# Patient Record
Sex: FEMALE | Race: ASIAN | NOT HISPANIC OR LATINO | Employment: FULL TIME | URBAN - METROPOLITAN AREA
[De-identification: names, ages, dates, MRNs, and addresses within clinical notes are randomized per-mention and may not be internally consistent; named-entity substitution may affect disease eponyms.]

---

## 2017-09-13 ENCOUNTER — COMMUNICATION - HEALTHEAST (OUTPATIENT)
Dept: FAMILY MEDICINE | Facility: CLINIC | Age: 22
End: 2017-09-13

## 2017-09-26 ENCOUNTER — COMMUNICATION - HEALTHEAST (OUTPATIENT)
Dept: TELEHEALTH | Facility: CLINIC | Age: 22
End: 2017-09-26

## 2017-09-26 ENCOUNTER — PRENATAL OFFICE VISIT - HEALTHEAST (OUTPATIENT)
Dept: FAMILY MEDICINE | Facility: CLINIC | Age: 22
End: 2017-09-26

## 2017-09-26 ENCOUNTER — COMMUNICATION - HEALTHEAST (OUTPATIENT)
Dept: FAMILY MEDICINE | Facility: CLINIC | Age: 22
End: 2017-09-26

## 2017-09-26 ENCOUNTER — HOSPITAL ENCOUNTER (OUTPATIENT)
Dept: ULTRASOUND IMAGING | Facility: HOSPITAL | Age: 22
Discharge: HOME | End: 2017-09-26
Attending: PHYSICIAN ASSISTANT

## 2017-09-26 DIAGNOSIS — Z34.00 NORMAL PREGNANCY, FIRST: ICD-10-CM

## 2017-09-26 DIAGNOSIS — N92.6 MISSED PERIOD: ICD-10-CM

## 2017-09-26 DIAGNOSIS — Q24.9 CONGENITAL HEART DEFECT: ICD-10-CM

## 2017-09-26 DIAGNOSIS — Z32.01 POSITIVE PREGNANCY TEST: ICD-10-CM

## 2017-09-29 ENCOUNTER — COMMUNICATION - HEALTHEAST (OUTPATIENT)
Dept: FAMILY MEDICINE | Facility: CLINIC | Age: 22
End: 2017-09-29

## 2017-10-06 ENCOUNTER — COMMUNICATION - HEALTHEAST (OUTPATIENT)
Dept: SCHEDULING | Facility: CLINIC | Age: 22
End: 2017-10-06

## 2017-10-12 ENCOUNTER — COMMUNICATION - HEALTHEAST (OUTPATIENT)
Dept: FAMILY MEDICINE | Facility: CLINIC | Age: 22
End: 2017-10-12

## 2017-10-30 ENCOUNTER — COMMUNICATION - HEALTHEAST (OUTPATIENT)
Dept: TELEHEALTH | Facility: CLINIC | Age: 22
End: 2017-10-30

## 2017-10-30 ENCOUNTER — PRENATAL OFFICE VISIT - HEALTHEAST (OUTPATIENT)
Dept: FAMILY MEDICINE | Facility: CLINIC | Age: 22
End: 2017-10-30

## 2017-10-30 DIAGNOSIS — Z12.4 SCREENING FOR CERVICAL CANCER: ICD-10-CM

## 2017-10-30 DIAGNOSIS — Z23 NEED FOR IMMUNIZATION AGAINST INFLUENZA: ICD-10-CM

## 2017-10-30 DIAGNOSIS — Z87.74 HX OF CONGENITAL ANOMALY OF HEART: ICD-10-CM

## 2017-10-30 DIAGNOSIS — Z34.01 SUPERVISION OF NORMAL FIRST PREGNANCY IN FIRST TRIMESTER: ICD-10-CM

## 2017-10-30 DIAGNOSIS — R11.0 NAUSEA: ICD-10-CM

## 2017-10-30 LAB
HBV SURFACE AG SERPL QL IA: NEGATIVE
HEPATITIS B SURFACE ANTIBODY LHE- HISTORICAL: POSITIVE
HIV 1+2 AB+HIV1 P24 AG SERPL QL IA: NEGATIVE

## 2017-10-30 ASSESSMENT — MIFFLIN-ST. JEOR: SCORE: 1529.68

## 2017-10-31 LAB — SYPHILIS RPR SCREEN - HISTORICAL: NORMAL

## 2017-11-01 ENCOUNTER — OFFICE VISIT - HEALTHEAST (OUTPATIENT)
Dept: CARDIOLOGY | Facility: CLINIC | Age: 22
End: 2017-11-01

## 2017-11-01 DIAGNOSIS — Q24.9 CONGENITAL HEART DISEASE: ICD-10-CM

## 2017-11-01 LAB
ATRIAL RATE - MUSE: 75 BPM
DIASTOLIC BLOOD PRESSURE - MUSE: NORMAL MMHG
INTERPRETATION ECG - MUSE: NORMAL
P AXIS - MUSE: 24 DEGREES
PR INTERVAL - MUSE: 150 MS
QRS DURATION - MUSE: 70 MS
QT - MUSE: 398 MS
QTC - MUSE: 444 MS
R AXIS - MUSE: 49 DEGREES
SYSTOLIC BLOOD PRESSURE - MUSE: NORMAL MMHG
T AXIS - MUSE: 21 DEGREES
VENTRICULAR RATE- MUSE: 75 BPM

## 2017-11-01 ASSESSMENT — MIFFLIN-ST. JEOR: SCORE: 1516.07

## 2017-11-03 ENCOUNTER — HOSPITAL ENCOUNTER (OUTPATIENT)
Dept: CARDIOLOGY | Facility: CLINIC | Age: 22
Discharge: HOME | End: 2017-11-03
Attending: INTERNAL MEDICINE

## 2017-11-03 LAB
AORTIC ROOT: 2.6 CM
AORTIC VALVE MEAN VELOCITY: 97.7 CM/S
AV DIMENSIONLESS INDEX VTI: 0.7
AV MEAN GRADIENT: 5 MMHG
AV PEAK GRADIENT: 9.5 MMHG
AV VALVE AREA: 1.8 CM2
AV VELOCITY RATIO: 0.7
BSA FOR ECHO PROCEDURE: 1.9 M2
CV BLOOD PRESSURE: NORMAL MMHG
CV ECHO HEIGHT: 63 IN
CV ECHO WEIGHT: 179 LBS
DOP CALC AO PEAK VEL: 154 CM/S
DOP CALC AO VTI: 27.8 CM
DOP CALC LVOT AREA: 2.54 CM2
DOP CALC LVOT DIAMETER: 1.8 CM
DOP CALC LVOT PEAK VEL: 106 CM/S
DOP CALC LVOT STROKE VOLUME: 49.1 CM3
DOP CALCLVOT PEAK VEL VTI: 19.3 CM
EJECTION FRACTION: 59 % (ref 55–75)
FRACTIONAL SHORTENING: 37.8 % (ref 28–44)
INTERVENTRICULAR SEPTUM IN END DIASTOLE: 1 CM (ref 0.6–0.9)
IVS/PW RATIO: 1
LA AREA 1: 15.9 CM2
LA AREA 2: 12.9 CM2
LEFT ATRIUM LENGTH: 4.97 CM
LEFT ATRIUM SIZE: 2.8 CM
LEFT ATRIUM TO AORTIC ROOT RATIO: 1.08 NO UNITS
LEFT ATRIUM VOLUME INDEX: 18.5 ML/M2
LEFT ATRIUM VOLUME: 35.1 CM3
LEFT VENTRICLE CARDIAC INDEX: 1.8 L/MIN/M2
LEFT VENTRICLE CARDIAC OUTPUT: 3.3 L/MIN
LEFT VENTRICLE DIASTOLIC VOLUME INDEX: 29.5 CM3/M2 (ref 34–74)
LEFT VENTRICLE DIASTOLIC VOLUME: 56 CM3 (ref 46–106)
LEFT VENTRICLE HEART RATE: 68 BPM
LEFT VENTRICLE MASS INDEX: 59.2 G/M2
LEFT VENTRICLE SYSTOLIC VOLUME INDEX: 12.1 CM3/M2 (ref 11–31)
LEFT VENTRICLE SYSTOLIC VOLUME: 23 CM3 (ref 14–42)
LEFT VENTRICULAR INTERNAL DIMENSION IN DIASTOLE: 3.7 CM (ref 3.8–5.2)
LEFT VENTRICULAR INTERNAL DIMENSION IN SYSTOLE: 2.3 CM (ref 2.2–3.5)
LEFT VENTRICULAR MASS: 112.5 G
LEFT VENTRICULAR OUTFLOW TRACT MEAN GRADIENT: 2 MMHG
LEFT VENTRICULAR OUTFLOW TRACT MEAN VELOCITY: 70.6 CM/S
LEFT VENTRICULAR OUTFLOW TRACT PEAK GRADIENT: 4 MMHG
LEFT VENTRICULAR POSTERIOR WALL IN END DIASTOLE: 1 CM (ref 0.6–0.9)
LV STROKE VOLUME INDEX: 25.8 ML/M2
MITRAL VALVE E/A RATIO: 1.4
MV AVERAGE E/E' RATIO: 6.2 CM/S
MV DECELERATION TIME: 169 MS
MV E'TISSUE VEL-LAT: 15.8 CM/S
MV E'TISSUE VEL-MED: 9.26 CM/S
MV LATERAL E/E' RATIO: 4.9
MV MEDIAL E/E' RATIO: 8.4
MV PEAK A VELOCITY: 54.5 CM/S
MV PEAK E VELOCITY: 77.5 CM/S
NUC REST DIASTOLIC VOLUME INDEX: 2864 LBS
NUC REST SYSTOLIC VOLUME INDEX: 63 IN
TRICUSPID VALVE ANULAR PLANE SYSTOLIC EXCURSION: 2.7 CM

## 2017-11-03 ASSESSMENT — MIFFLIN-ST. JEOR: SCORE: 1516.07

## 2017-11-06 ENCOUNTER — COMMUNICATION - HEALTHEAST (OUTPATIENT)
Dept: FAMILY MEDICINE | Facility: CLINIC | Age: 22
End: 2017-11-06

## 2017-11-06 DIAGNOSIS — Z36.9 1ST TRIMESTER SCREENING: ICD-10-CM

## 2017-11-06 LAB
BKR LAB AP ABNORMAL BLEEDING: NO
BKR LAB AP BIRTH CONTROL/HORMONES: NORMAL
BKR LAB AP CERVICAL APPEARANCE: NORMAL
BKR LAB AP GYN ADEQUACY: NORMAL
BKR LAB AP GYN INTERPRETATION: NORMAL
BKR LAB AP HPV REFLEX: NO
BKR LAB AP LMP: NORMAL
BKR LAB AP PATIENT STATUS: NORMAL
BKR LAB AP PREVIOUS ABNORMAL: NORMAL
BKR LAB AP PREVIOUS NORMAL: NORMAL
HIGH RISK?: NO
PATH REPORT.COMMENTS IMP SPEC: NORMAL
RESULT FLAG (HE HISTORICAL CONVERSION): NORMAL

## 2017-11-07 ENCOUNTER — COMMUNICATION - HEALTHEAST (OUTPATIENT)
Dept: FAMILY MEDICINE | Facility: CLINIC | Age: 22
End: 2017-11-07

## 2017-11-07 DIAGNOSIS — Z3A.12 12 WEEKS GESTATION OF PREGNANCY: ICD-10-CM

## 2017-11-07 DIAGNOSIS — F12.11: ICD-10-CM

## 2017-11-08 ENCOUNTER — AMBULATORY - HEALTHEAST (OUTPATIENT)
Dept: LAB | Facility: CLINIC | Age: 22
End: 2017-11-08

## 2017-11-08 ENCOUNTER — RECORDS - HEALTHEAST (OUTPATIENT)
Dept: ADMINISTRATIVE | Facility: OTHER | Age: 22
End: 2017-11-08

## 2017-11-08 DIAGNOSIS — F12.11: ICD-10-CM

## 2017-11-08 DIAGNOSIS — Z3A.12 12 WEEKS GESTATION OF PREGNANCY: ICD-10-CM

## 2017-11-16 ENCOUNTER — COMMUNICATION - HEALTHEAST (OUTPATIENT)
Dept: OBGYN | Facility: CLINIC | Age: 22
End: 2017-11-16

## 2017-11-22 ENCOUNTER — PRENATAL OFFICE VISIT - HEALTHEAST (OUTPATIENT)
Dept: FAMILY MEDICINE | Facility: CLINIC | Age: 22
End: 2017-11-22

## 2017-11-22 DIAGNOSIS — Z34.92 SECOND TRIMESTER PREGNANCY: ICD-10-CM

## 2017-12-20 ENCOUNTER — PRENATAL OFFICE VISIT - HEALTHEAST (OUTPATIENT)
Dept: FAMILY MEDICINE | Facility: CLINIC | Age: 22
End: 2017-12-20

## 2017-12-20 DIAGNOSIS — Z34.92 ENCOUNTER FOR SUPERVISION OF NORMAL PREGNANCY IN SECOND TRIMESTER: ICD-10-CM

## 2017-12-27 ENCOUNTER — COMMUNICATION - HEALTHEAST (OUTPATIENT)
Dept: FAMILY MEDICINE | Facility: CLINIC | Age: 22
End: 2017-12-27

## 2017-12-28 ENCOUNTER — RECORDS - HEALTHEAST (OUTPATIENT)
Dept: ADMINISTRATIVE | Facility: OTHER | Age: 22
End: 2017-12-28

## 2018-01-16 ENCOUNTER — PRENATAL OFFICE VISIT - HEALTHEAST (OUTPATIENT)
Dept: FAMILY MEDICINE | Facility: CLINIC | Age: 23
End: 2018-01-16

## 2018-01-16 DIAGNOSIS — Z34.02 ENCOUNTER FOR SUPERVISION OF NORMAL FIRST PREGNANCY IN SECOND TRIMESTER: ICD-10-CM

## 2018-01-16 LAB
ALBUMIN UR-MCNC: ABNORMAL MG/DL
GLUCOSE UR STRIP-MCNC: NEGATIVE MG/DL
KETONES UR STRIP-MCNC: NEGATIVE MG/DL

## 2018-02-16 ENCOUNTER — PRENATAL OFFICE VISIT - HEALTHEAST (OUTPATIENT)
Dept: FAMILY MEDICINE | Facility: CLINIC | Age: 23
End: 2018-02-16

## 2018-02-16 DIAGNOSIS — O26.899 LOW BACK PAIN DURING PREGNANCY: ICD-10-CM

## 2018-02-16 DIAGNOSIS — Z34.02 ENCOUNTER FOR SUPERVISION OF NORMAL FIRST PREGNANCY IN SECOND TRIMESTER: ICD-10-CM

## 2018-02-16 DIAGNOSIS — M54.50 LOW BACK PAIN DURING PREGNANCY: ICD-10-CM

## 2018-02-16 LAB
ALBUMIN UR-MCNC: NEGATIVE MG/DL
BASOPHILS # BLD AUTO: 0.1 THOU/UL (ref 0–0.2)
BASOPHILS NFR BLD AUTO: 1 % (ref 0–2)
EOSINOPHIL # BLD AUTO: 0.3 THOU/UL (ref 0–0.4)
EOSINOPHIL NFR BLD AUTO: 3 % (ref 0–6)
ERYTHROCYTE [DISTWIDTH] IN BLOOD BY AUTOMATED COUNT: 11 % (ref 11–14.5)
FASTING STATUS PATIENT QL REPORTED: YES
GLUCOSE 1H P 50 G GLC PO SERPL-MCNC: 146 MG/DL (ref 70–139)
GLUCOSE UR STRIP-MCNC: ABNORMAL MG/DL
HCT VFR BLD AUTO: 35 % (ref 35–47)
HGB BLD-MCNC: 11.5 G/DL (ref 12–16)
KETONES UR STRIP-MCNC: NEGATIVE MG/DL
LYMPHOCYTES # BLD AUTO: 1.8 THOU/UL (ref 0.8–4.4)
LYMPHOCYTES NFR BLD AUTO: 18 % (ref 20–40)
MCH RBC QN AUTO: 32.1 PG (ref 27–34)
MCHC RBC AUTO-ENTMCNC: 32.9 G/DL (ref 32–36)
MCV RBC AUTO: 98 FL (ref 80–100)
MONOCYTES # BLD AUTO: 0.5 THOU/UL (ref 0–0.9)
MONOCYTES NFR BLD AUTO: 5 % (ref 2–10)
NEUTROPHILS # BLD AUTO: 7.4 THOU/UL (ref 2–7.7)
NEUTROPHILS NFR BLD AUTO: 74 % (ref 50–70)
PLATELET # BLD AUTO: 230 THOU/UL (ref 140–440)
PMV BLD AUTO: 7.8 FL (ref 7–10)
RBC # BLD AUTO: 3.59 MILL/UL (ref 3.8–5.4)
WBC: 10 THOU/UL (ref 4–11)

## 2018-02-19 LAB — SYPHILIS RPR SCREEN - HISTORICAL: NORMAL

## 2018-02-23 ENCOUNTER — COMMUNICATION - HEALTHEAST (OUTPATIENT)
Dept: FAMILY MEDICINE | Facility: CLINIC | Age: 23
End: 2018-02-23

## 2018-03-06 ENCOUNTER — AMBULATORY - HEALTHEAST (OUTPATIENT)
Dept: FAMILY MEDICINE | Facility: CLINIC | Age: 23
End: 2018-03-06

## 2018-03-06 DIAGNOSIS — O99.810 ABNORMAL GLUCOSE TOLERANCE IN PREGNANCY: ICD-10-CM

## 2018-03-16 ENCOUNTER — PRENATAL OFFICE VISIT - HEALTHEAST (OUTPATIENT)
Dept: FAMILY MEDICINE | Facility: CLINIC | Age: 23
End: 2018-03-16

## 2018-03-16 DIAGNOSIS — Z34.03 ENCOUNTER FOR SUPERVISION OF NORMAL FIRST PREGNANCY IN THIRD TRIMESTER: ICD-10-CM

## 2018-03-20 ENCOUNTER — AMBULATORY - HEALTHEAST (OUTPATIENT)
Dept: LAB | Facility: CLINIC | Age: 23
End: 2018-03-20

## 2018-03-20 DIAGNOSIS — O99.810 ABNORMAL GLUCOSE TOLERANCE IN PREGNANCY: ICD-10-CM

## 2018-03-20 LAB
FASTING STATUS PATIENT QL REPORTED: YES
GLUCOSE 1H P 100 G GLC PO SERPL-MCNC: 152 MG/DL
GLUCOSE 2H P 100 G GLC PO SERPL-MCNC: 108 MG/DL
GLUCOSE 3H P 100 G GLC PO SERPL-MCNC: 101 MG/DL
GLUCOSE P FAST SERPL-MCNC: 76 MG/DL

## 2018-03-21 ENCOUNTER — COMMUNICATION - HEALTHEAST (OUTPATIENT)
Dept: FAMILY MEDICINE | Facility: CLINIC | Age: 23
End: 2018-03-21

## 2018-04-06 ENCOUNTER — PRENATAL OFFICE VISIT - HEALTHEAST (OUTPATIENT)
Dept: FAMILY MEDICINE | Facility: CLINIC | Age: 23
End: 2018-04-06

## 2018-04-06 DIAGNOSIS — Z34.03 ENCOUNTER FOR SUPERVISION OF NORMAL FIRST PREGNANCY IN THIRD TRIMESTER: ICD-10-CM

## 2018-04-06 LAB
ALBUMIN UR-MCNC: ABNORMAL MG/DL
GLUCOSE UR STRIP-MCNC: NEGATIVE MG/DL
KETONES UR STRIP-MCNC: ABNORMAL MG/DL

## 2018-04-20 ENCOUNTER — PRENATAL OFFICE VISIT - HEALTHEAST (OUTPATIENT)
Dept: FAMILY MEDICINE | Facility: CLINIC | Age: 23
End: 2018-04-20

## 2018-04-20 DIAGNOSIS — Z3A.36 36 WEEKS GESTATION OF PREGNANCY: ICD-10-CM

## 2018-04-20 DIAGNOSIS — E66.812 CLASS 2 OBESITY IN ADULT: ICD-10-CM

## 2018-04-20 LAB
ALBUMIN UR-MCNC: NEGATIVE MG/DL
GLUCOSE UR STRIP-MCNC: NEGATIVE MG/DL
KETONES UR STRIP-MCNC: ABNORMAL MG/DL

## 2018-04-20 ASSESSMENT — MIFFLIN-ST. JEOR: SCORE: 1535.91

## 2018-04-22 LAB
ALLERGIC TO PENICILLIN: NO
GP B STREP DNA SPEC QL NAA+PROBE: NEGATIVE

## 2018-04-23 ENCOUNTER — COMMUNICATION - HEALTHEAST (OUTPATIENT)
Dept: FAMILY MEDICINE | Facility: CLINIC | Age: 23
End: 2018-04-23

## 2018-04-23 ENCOUNTER — HOSPITAL ENCOUNTER (OUTPATIENT)
Dept: ULTRASOUND IMAGING | Facility: HOSPITAL | Age: 23
Discharge: HOME | End: 2018-04-23
Attending: FAMILY MEDICINE

## 2018-04-27 ENCOUNTER — PRENATAL OFFICE VISIT - HEALTHEAST (OUTPATIENT)
Dept: FAMILY MEDICINE | Facility: CLINIC | Age: 23
End: 2018-04-27

## 2018-04-27 DIAGNOSIS — Z34.03 ENCOUNTER FOR SUPERVISION OF NORMAL FIRST PREGNANCY IN THIRD TRIMESTER: ICD-10-CM

## 2018-04-27 ASSESSMENT — MIFFLIN-ST. JEOR: SCORE: 1540.45

## 2018-05-04 ENCOUNTER — PRENATAL OFFICE VISIT - HEALTHEAST (OUTPATIENT)
Dept: FAMILY MEDICINE | Facility: CLINIC | Age: 23
End: 2018-05-04

## 2018-05-04 DIAGNOSIS — Z34.03 ENCOUNTER FOR SUPERVISION OF NORMAL FIRST PREGNANCY IN THIRD TRIMESTER: ICD-10-CM

## 2018-05-04 ASSESSMENT — MIFFLIN-ST. JEOR: SCORE: 1544.98

## 2018-05-11 ENCOUNTER — PRENATAL OFFICE VISIT - HEALTHEAST (OUTPATIENT)
Dept: FAMILY MEDICINE | Facility: CLINIC | Age: 23
End: 2018-05-11

## 2018-05-11 DIAGNOSIS — Z34.03 ENCOUNTER FOR SUPERVISION OF NORMAL FIRST PREGNANCY IN THIRD TRIMESTER: ICD-10-CM

## 2018-05-18 ENCOUNTER — PRENATAL OFFICE VISIT - HEALTHEAST (OUTPATIENT)
Dept: FAMILY MEDICINE | Facility: CLINIC | Age: 23
End: 2018-05-18

## 2018-05-18 DIAGNOSIS — Z03.71 NO LEAKAGE OF AMNIOTIC FLUID INTO VAGINA: ICD-10-CM

## 2018-05-18 LAB
ALBUMIN UR-MCNC: NEGATIVE MG/DL
CRYSTALS AMN MICRO: NORMAL
GLUCOSE UR STRIP-MCNC: NEGATIVE MG/DL
KETONES UR STRIP-MCNC: ABNORMAL MG/DL

## 2018-05-18 ASSESSMENT — MIFFLIN-ST. JEOR: SCORE: 1567.66

## 2018-05-23 ENCOUNTER — PRENATAL OFFICE VISIT - HEALTHEAST (OUTPATIENT)
Dept: FAMILY MEDICINE | Facility: CLINIC | Age: 23
End: 2018-05-23

## 2018-05-23 DIAGNOSIS — Z34.03 ENCOUNTER FOR SUPERVISION OF NORMAL FIRST PREGNANCY IN THIRD TRIMESTER: ICD-10-CM

## 2018-05-24 ENCOUNTER — HOSPITAL ENCOUNTER (OUTPATIENT)
Dept: ULTRASOUND IMAGING | Facility: HOSPITAL | Age: 23
Discharge: HOME | End: 2018-05-24
Attending: FAMILY MEDICINE

## 2018-05-24 ENCOUNTER — RECORDS - HEALTHEAST (OUTPATIENT)
Dept: ADMINISTRATIVE | Facility: OTHER | Age: 23
End: 2018-05-24

## 2018-05-24 ASSESSMENT — MIFFLIN-ST. JEOR: SCORE: 1586.37

## 2018-05-25 ENCOUNTER — ANESTHESIA - HEALTHEAST (OUTPATIENT)
Dept: OBGYN | Facility: HOSPITAL | Age: 23
End: 2018-05-25

## 2018-05-26 ENCOUNTER — SURGERY - HEALTHEAST (OUTPATIENT)
Dept: OBGYN | Facility: HOSPITAL | Age: 23
End: 2018-05-26

## 2018-05-29 ENCOUNTER — HOME CARE/HOSPICE - HEALTHEAST (OUTPATIENT)
Dept: HOME HEALTH SERVICES | Facility: HOME HEALTH | Age: 23
End: 2018-05-29

## 2018-05-30 ENCOUNTER — HOME CARE/HOSPICE - HEALTHEAST (OUTPATIENT)
Dept: HOME HEALTH SERVICES | Facility: HOME HEALTH | Age: 23
End: 2018-05-30

## 2018-06-01 ENCOUNTER — COMMUNICATION - HEALTHEAST (OUTPATIENT)
Dept: FAMILY MEDICINE | Facility: CLINIC | Age: 23
End: 2018-06-01

## 2018-06-11 ENCOUNTER — COMMUNICATION - HEALTHEAST (OUTPATIENT)
Dept: FAMILY MEDICINE | Facility: CLINIC | Age: 23
End: 2018-06-11

## 2018-08-06 ENCOUNTER — OFFICE VISIT - HEALTHEAST (OUTPATIENT)
Dept: FAMILY MEDICINE | Facility: CLINIC | Age: 23
End: 2018-08-06

## 2018-08-06 DIAGNOSIS — R10.13 ABDOMINAL PAIN, EPIGASTRIC: ICD-10-CM

## 2018-08-06 DIAGNOSIS — Z23 NEED FOR HPV VACCINATION: ICD-10-CM

## 2018-08-06 LAB
HCG UR QL: NEGATIVE
SP GR UR STRIP: 1.02 (ref 1–1.03)

## 2018-09-05 ASSESSMENT — MIFFLIN-ST. JEOR: SCORE: 1509.26

## 2018-09-06 ENCOUNTER — ANESTHESIA - HEALTHEAST (OUTPATIENT)
Dept: SURGERY | Facility: HOSPITAL | Age: 23
End: 2018-09-06

## 2018-09-06 ENCOUNTER — SURGERY - HEALTHEAST (OUTPATIENT)
Dept: SURGERY | Facility: HOSPITAL | Age: 23
End: 2018-09-06

## 2018-09-06 ASSESSMENT — MIFFLIN-ST. JEOR: SCORE: 1542.37

## 2018-09-11 ENCOUNTER — COMMUNICATION - HEALTHEAST (OUTPATIENT)
Dept: CARE COORDINATION | Facility: CLINIC | Age: 23
End: 2018-09-11

## 2018-09-11 ENCOUNTER — RECORDS - HEALTHEAST (OUTPATIENT)
Dept: ADMINISTRATIVE | Facility: OTHER | Age: 23
End: 2018-09-11

## 2019-04-12 ENCOUNTER — COMMUNICATION - HEALTHEAST (OUTPATIENT)
Dept: FAMILY MEDICINE | Facility: CLINIC | Age: 24
End: 2019-04-12

## 2019-04-18 ENCOUNTER — PRENATAL OFFICE VISIT - HEALTHEAST (OUTPATIENT)
Dept: FAMILY MEDICINE | Facility: CLINIC | Age: 24
End: 2019-04-18

## 2019-04-18 DIAGNOSIS — N92.6 ABNORMAL MENSES: ICD-10-CM

## 2019-04-18 DIAGNOSIS — Q24.9 CONGENITAL HEART DEFECT: ICD-10-CM

## 2019-04-18 DIAGNOSIS — Z3A.01 LESS THAN 8 WEEKS GESTATION OF PREGNANCY: ICD-10-CM

## 2019-04-18 DIAGNOSIS — Z32.01 PREGNANCY TEST POSITIVE: ICD-10-CM

## 2019-04-18 LAB
ALBUMIN UR-MCNC: NEGATIVE MG/DL
AMPHETAMINES UR QL SCN: NORMAL
BARBITURATES UR QL: NORMAL
BASOPHILS # BLD AUTO: 0.1 THOU/UL (ref 0–0.2)
BASOPHILS NFR BLD AUTO: 0 % (ref 0–2)
BENZODIAZ UR QL: NORMAL
CANNABINOIDS UR QL SCN: NORMAL
COCAINE UR QL: NORMAL
CREAT UR-MCNC: 233.7 MG/DL
EOSINOPHIL # BLD AUTO: 0.3 THOU/UL (ref 0–0.4)
EOSINOPHIL NFR BLD AUTO: 2 % (ref 0–6)
ERYTHROCYTE [DISTWIDTH] IN BLOOD BY AUTOMATED COUNT: 12.8 % (ref 11–14.5)
GLUCOSE UR STRIP-MCNC: NEGATIVE MG/DL
HCG UR QL: POSITIVE
HCT VFR BLD AUTO: 41.7 % (ref 35–47)
HGB BLD-MCNC: 13.5 G/DL (ref 12–16)
HIV 1+2 AB+HIV1 P24 AG SERPL QL IA: NEGATIVE
KETONES UR STRIP-MCNC: ABNORMAL MG/DL
LYMPHOCYTES # BLD AUTO: 2.8 THOU/UL (ref 0.8–4.4)
LYMPHOCYTES NFR BLD AUTO: 23 % (ref 20–40)
MCH RBC QN AUTO: 31.7 PG (ref 27–34)
MCHC RBC AUTO-ENTMCNC: 32.4 G/DL (ref 32–36)
MCV RBC AUTO: 98 FL (ref 80–100)
MONOCYTES # BLD AUTO: 0.7 THOU/UL (ref 0–0.9)
MONOCYTES NFR BLD AUTO: 6 % (ref 2–10)
NEUTROPHILS # BLD AUTO: 8.1 THOU/UL (ref 2–7.7)
NEUTROPHILS NFR BLD AUTO: 68 % (ref 50–70)
OPIATES UR QL SCN: NORMAL
OXYCODONE UR QL: NORMAL
PCP UR QL SCN: NORMAL
PLATELET # BLD AUTO: 267 THOU/UL (ref 140–440)
PMV BLD AUTO: 10.7 FL (ref 8.5–12.5)
RBC # BLD AUTO: 4.26 MILL/UL (ref 3.8–5.4)
WBC: 12 THOU/UL (ref 4–11)

## 2019-04-18 ASSESSMENT — MIFFLIN-ST. JEOR: SCORE: 1593.74

## 2019-04-19 LAB
ABO/RH(D): NORMAL
ABORH REPEAT: NORMAL
ANTIBODY SCREEN: NEGATIVE
BACTERIA SPEC CULT: ABNORMAL
BACTERIA SPEC CULT: ABNORMAL
COLLECTION METHOD: NORMAL
HBV SURFACE AG SERPL QL IA: NEGATIVE
LEAD BLD-MCNC: NORMAL UG/DL
LEAD RETEST: NO
RUBV IGG SERPL QL IA: POSITIVE
T PALLIDUM AB SER QL: NEGATIVE

## 2019-04-20 LAB — LEAD BLDV-MCNC: <2 UG/DL (ref 0–4.9)

## 2019-04-21 ENCOUNTER — AMBULATORY - HEALTHEAST (OUTPATIENT)
Dept: FAMILY MEDICINE | Facility: CLINIC | Age: 24
End: 2019-04-21

## 2019-04-21 DIAGNOSIS — R82.71 BACTERIURIA IN PREGNANCY: ICD-10-CM

## 2019-04-21 DIAGNOSIS — O99.891 BACTERIURIA IN PREGNANCY: ICD-10-CM

## 2019-04-24 ENCOUNTER — COMMUNICATION - HEALTHEAST (OUTPATIENT)
Dept: FAMILY MEDICINE | Facility: CLINIC | Age: 24
End: 2019-04-24

## 2019-04-30 ENCOUNTER — HOSPITAL ENCOUNTER (OUTPATIENT)
Dept: ULTRASOUND IMAGING | Facility: HOSPITAL | Age: 24
Discharge: HOME | End: 2019-04-30
Attending: PHYSICIAN ASSISTANT

## 2019-04-30 DIAGNOSIS — Z3A.01 LESS THAN 8 WEEKS GESTATION OF PREGNANCY: ICD-10-CM

## 2019-04-30 DIAGNOSIS — Z32.01 PREGNANCY TEST POSITIVE: ICD-10-CM

## 2019-05-07 ENCOUNTER — OFFICE VISIT - HEALTHEAST (OUTPATIENT)
Dept: FAMILY MEDICINE | Facility: CLINIC | Age: 24
End: 2019-05-07

## 2019-05-07 DIAGNOSIS — Z02.89 ENCOUNTER FOR COMPLETION OF FORM WITH PATIENT: ICD-10-CM

## 2019-05-07 DIAGNOSIS — O99.891 BACTERIURIA IN PREGNANCY: ICD-10-CM

## 2019-05-07 DIAGNOSIS — R11.2 NAUSEA AND VOMITING, INTRACTABILITY OF VOMITING NOT SPECIFIED, UNSPECIFIED VOMITING TYPE: ICD-10-CM

## 2019-05-07 DIAGNOSIS — R82.71 BACTERIURIA IN PREGNANCY: ICD-10-CM

## 2019-05-07 ASSESSMENT — MIFFLIN-ST. JEOR: SCORE: 1592.16

## 2019-05-12 ENCOUNTER — ANESTHESIA - HEALTHEAST (OUTPATIENT)
Dept: SURGERY | Facility: HOSPITAL | Age: 24
End: 2019-05-12

## 2019-05-12 ENCOUNTER — SURGERY - HEALTHEAST (OUTPATIENT)
Dept: SURGERY | Facility: HOSPITAL | Age: 24
End: 2019-05-12

## 2019-05-12 ASSESSMENT — MIFFLIN-ST. JEOR: SCORE: 1568.23

## 2019-05-16 ENCOUNTER — COMMUNICATION - HEALTHEAST (OUTPATIENT)
Dept: CARE COORDINATION | Facility: CLINIC | Age: 24
End: 2019-05-16

## 2019-05-17 ENCOUNTER — COMMUNICATION - HEALTHEAST (OUTPATIENT)
Dept: FAMILY MEDICINE | Facility: CLINIC | Age: 24
End: 2019-05-17

## 2019-05-31 ENCOUNTER — PRENATAL OFFICE VISIT - HEALTHEAST (OUTPATIENT)
Dept: FAMILY MEDICINE | Facility: CLINIC | Age: 24
End: 2019-05-31

## 2019-05-31 ENCOUNTER — AMBULATORY - HEALTHEAST (OUTPATIENT)
Dept: FAMILY MEDICINE | Facility: CLINIC | Age: 24
End: 2019-05-31

## 2019-05-31 DIAGNOSIS — Z09 POSTOPERATIVE EXAMINATION: ICD-10-CM

## 2019-05-31 DIAGNOSIS — Z34.91 FIRST TRIMESTER PREGNANCY: ICD-10-CM

## 2019-06-28 ENCOUNTER — PRENATAL OFFICE VISIT - HEALTHEAST (OUTPATIENT)
Dept: FAMILY MEDICINE | Facility: CLINIC | Age: 24
End: 2019-06-28

## 2019-06-28 DIAGNOSIS — R82.71 BACTERIURIA IN PREGNANCY: ICD-10-CM

## 2019-06-28 DIAGNOSIS — O21.9 NAUSEA AND VOMITING DURING PREGNANCY: ICD-10-CM

## 2019-06-28 DIAGNOSIS — Z3A.16 16 WEEKS GESTATION OF PREGNANCY: ICD-10-CM

## 2019-06-28 DIAGNOSIS — R55 PRE-SYNCOPE: ICD-10-CM

## 2019-06-28 DIAGNOSIS — O99.891 BACTERIURIA IN PREGNANCY: ICD-10-CM

## 2019-06-28 LAB
ALBUMIN UR-MCNC: NEGATIVE MG/DL
GLUCOSE UR STRIP-MCNC: NEGATIVE MG/DL
HGB BLD-MCNC: 14.2 G/DL (ref 12–16)
KETONES UR STRIP-MCNC: NEGATIVE MG/DL

## 2019-06-28 ASSESSMENT — MIFFLIN-ST. JEOR: SCORE: 1552.35

## 2019-06-29 LAB — BACTERIA SPEC CULT: NORMAL

## 2019-06-30 ENCOUNTER — COMMUNICATION - HEALTHEAST (OUTPATIENT)
Dept: FAMILY MEDICINE | Facility: CLINIC | Age: 24
End: 2019-06-30

## 2019-07-26 ENCOUNTER — PRENATAL OFFICE VISIT - HEALTHEAST (OUTPATIENT)
Dept: FAMILY MEDICINE | Facility: CLINIC | Age: 24
End: 2019-07-26

## 2019-07-26 DIAGNOSIS — Z34.92 ENCOUNTER FOR SUPERVISION OF LOW-RISK PREGNANCY IN SECOND TRIMESTER: ICD-10-CM

## 2019-07-26 DIAGNOSIS — O21.9 NAUSEA AND VOMITING DURING PREGNANCY: ICD-10-CM

## 2019-07-26 ASSESSMENT — MIFFLIN-ST. JEOR: SCORE: 1568.23

## 2019-07-29 ENCOUNTER — HOSPITAL ENCOUNTER (OUTPATIENT)
Dept: ULTRASOUND IMAGING | Facility: HOSPITAL | Age: 24
Discharge: HOME | End: 2019-07-29
Attending: FAMILY MEDICINE

## 2019-08-23 ENCOUNTER — PRENATAL OFFICE VISIT - HEALTHEAST (OUTPATIENT)
Dept: FAMILY MEDICINE | Facility: CLINIC | Age: 24
End: 2019-08-23

## 2019-08-23 DIAGNOSIS — M54.50 CHRONIC BILATERAL LOW BACK PAIN WITHOUT SCIATICA: ICD-10-CM

## 2019-08-23 DIAGNOSIS — G89.29 CHRONIC BILATERAL LOW BACK PAIN WITHOUT SCIATICA: ICD-10-CM

## 2019-08-23 DIAGNOSIS — Z34.92 ENCOUNTER FOR SUPERVISION OF LOW-RISK PREGNANCY IN SECOND TRIMESTER: ICD-10-CM

## 2019-08-23 ASSESSMENT — MIFFLIN-ST. JEOR: SCORE: 1572.76

## 2019-09-20 ENCOUNTER — PRENATAL OFFICE VISIT - HEALTHEAST (OUTPATIENT)
Dept: FAMILY MEDICINE | Facility: CLINIC | Age: 24
End: 2019-09-20

## 2019-09-20 ENCOUNTER — COMMUNICATION - HEALTHEAST (OUTPATIENT)
Dept: FAMILY MEDICINE | Facility: CLINIC | Age: 24
End: 2019-09-20

## 2019-09-20 DIAGNOSIS — Z34.92 ENCOUNTER FOR SUPERVISION OF LOW-RISK PREGNANCY IN SECOND TRIMESTER: ICD-10-CM

## 2019-09-20 LAB
ALBUMIN UR-MCNC: NEGATIVE MG/DL
ERYTHROCYTE [DISTWIDTH] IN BLOOD BY AUTOMATED COUNT: 11.7 % (ref 11–14.5)
FASTING STATUS PATIENT QL REPORTED: NO
GLUCOSE 1H P 50 G GLC PO SERPL-MCNC: 84 MG/DL (ref 70–139)
GLUCOSE UR STRIP-MCNC: NEGATIVE MG/DL
HCT VFR BLD AUTO: 36.3 % (ref 35–47)
HGB BLD-MCNC: 12.4 G/DL (ref 12–16)
KETONES UR STRIP-MCNC: NEGATIVE MG/DL
MCH RBC QN AUTO: 32 PG (ref 27–34)
MCHC RBC AUTO-ENTMCNC: 34.2 G/DL (ref 32–36)
MCV RBC AUTO: 94 FL (ref 80–100)
PLATELET # BLD AUTO: 254 THOU/UL (ref 140–440)
PMV BLD AUTO: 8.2 FL (ref 7–10)
RBC # BLD AUTO: 3.88 MILL/UL (ref 3.8–5.4)
WBC: 9.3 THOU/UL (ref 4–11)

## 2019-09-21 LAB
BACTERIA SPEC CULT: NO GROWTH
T PALLIDUM AB SER QL: NEGATIVE

## 2019-09-30 ENCOUNTER — PRENATAL OFFICE VISIT - HEALTHEAST (OUTPATIENT)
Dept: FAMILY MEDICINE | Facility: CLINIC | Age: 24
End: 2019-09-30

## 2019-09-30 DIAGNOSIS — O34.219 HX OF CESAREAN SECTION COMPLICATING PREGNANCY: ICD-10-CM

## 2019-09-30 DIAGNOSIS — M54.50 LOW BACK PAIN DURING PREGNANCY IN THIRD TRIMESTER: ICD-10-CM

## 2019-09-30 DIAGNOSIS — O26.893 LOW BACK PAIN DURING PREGNANCY IN THIRD TRIMESTER: ICD-10-CM

## 2019-10-07 ENCOUNTER — COMMUNICATION - HEALTHEAST (OUTPATIENT)
Dept: FAMILY MEDICINE | Facility: CLINIC | Age: 24
End: 2019-10-07

## 2019-10-22 ENCOUNTER — RECORDS - HEALTHEAST (OUTPATIENT)
Dept: ADMINISTRATIVE | Facility: OTHER | Age: 24
End: 2019-10-22

## 2019-10-25 ENCOUNTER — PRENATAL OFFICE VISIT - HEALTHEAST (OUTPATIENT)
Dept: FAMILY MEDICINE | Facility: CLINIC | Age: 24
End: 2019-10-25

## 2019-10-25 DIAGNOSIS — O26.893 LOW BACK PAIN DURING PREGNANCY IN THIRD TRIMESTER: ICD-10-CM

## 2019-10-25 DIAGNOSIS — M54.50 LOW BACK PAIN DURING PREGNANCY IN THIRD TRIMESTER: ICD-10-CM

## 2019-10-25 DIAGNOSIS — Z34.93 ENCOUNTER FOR SUPERVISION OF LOW-RISK PREGNANCY IN THIRD TRIMESTER: ICD-10-CM

## 2019-10-25 ASSESSMENT — MIFFLIN-ST. JEOR: SCORE: 1599.98

## 2019-11-08 ENCOUNTER — PRENATAL OFFICE VISIT - HEALTHEAST (OUTPATIENT)
Dept: FAMILY MEDICINE | Facility: CLINIC | Age: 24
End: 2019-11-08

## 2019-11-08 DIAGNOSIS — Z34.93 ENCOUNTER FOR SUPERVISION OF LOW-RISK PREGNANCY IN THIRD TRIMESTER: ICD-10-CM

## 2019-11-08 DIAGNOSIS — E66.9 OBESITY (BMI 35.0-39.9 WITHOUT COMORBIDITY): ICD-10-CM

## 2019-11-08 ASSESSMENT — MIFFLIN-ST. JEOR: SCORE: 1590.91

## 2019-11-26 ENCOUNTER — PRENATAL OFFICE VISIT - HEALTHEAST (OUTPATIENT)
Dept: FAMILY MEDICINE | Facility: CLINIC | Age: 24
End: 2019-11-26

## 2019-11-26 DIAGNOSIS — K64.4 EXTERNAL HEMORRHOIDS: ICD-10-CM

## 2019-11-26 DIAGNOSIS — O21.9 NAUSEA AND VOMITING DURING PREGNANCY: ICD-10-CM

## 2019-11-26 DIAGNOSIS — Z34.93 ENCOUNTER FOR SUPERVISION OF LOW-RISK PREGNANCY IN THIRD TRIMESTER: ICD-10-CM

## 2019-11-26 ASSESSMENT — MIFFLIN-ST. JEOR: SCORE: 1590

## 2019-11-28 LAB
ALLERGIC TO PENICILLIN: NO
GP B STREP DNA SPEC QL NAA+PROBE: POSITIVE

## 2019-12-02 ENCOUNTER — PRENATAL OFFICE VISIT - HEALTHEAST (OUTPATIENT)
Dept: FAMILY MEDICINE | Facility: CLINIC | Age: 24
End: 2019-12-02

## 2019-12-02 ENCOUNTER — HOSPITAL ENCOUNTER (OUTPATIENT)
Dept: ULTRASOUND IMAGING | Facility: HOSPITAL | Age: 24
Discharge: HOME | End: 2019-12-02
Attending: FAMILY MEDICINE

## 2019-12-02 DIAGNOSIS — O34.219 HX OF CESAREAN SECTION COMPLICATING PREGNANCY: ICD-10-CM

## 2019-12-02 DIAGNOSIS — Z34.93 ENCOUNTER FOR SUPERVISION OF LOW-RISK PREGNANCY IN THIRD TRIMESTER: ICD-10-CM

## 2019-12-02 DIAGNOSIS — O28.8 AFI (AMNIOTIC FLUID INDEX) BORDERLINE LOW: ICD-10-CM

## 2019-12-02 ASSESSMENT — MIFFLIN-ST. JEOR: SCORE: 1604.52

## 2019-12-03 ENCOUNTER — OFFICE VISIT - HEALTHEAST (OUTPATIENT)
Dept: MATERNAL FETAL MEDICINE | Facility: HOSPITAL | Age: 24
End: 2019-12-03

## 2019-12-03 ENCOUNTER — ANESTHESIA - HEALTHEAST (OUTPATIENT)
Dept: OBGYN | Facility: HOSPITAL | Age: 24
End: 2019-12-03

## 2019-12-03 ENCOUNTER — AMBULATORY - HEALTHEAST (OUTPATIENT)
Dept: MATERNAL FETAL MEDICINE | Facility: HOSPITAL | Age: 24
End: 2019-12-03

## 2019-12-03 ENCOUNTER — RECORDS - HEALTHEAST (OUTPATIENT)
Dept: ADMINISTRATIVE | Facility: OTHER | Age: 24
End: 2019-12-03

## 2019-12-03 ENCOUNTER — RECORDS - HEALTHEAST (OUTPATIENT)
Dept: ULTRASOUND IMAGING | Facility: HOSPITAL | Age: 24
End: 2019-12-03

## 2019-12-03 ENCOUNTER — SURGERY - HEALTHEAST (OUTPATIENT)
Dept: OBGYN | Facility: HOSPITAL | Age: 24
End: 2019-12-03

## 2019-12-03 DIAGNOSIS — O26.90 PREGNANCY RELATED CONDITIONS, UNSPECIFIED, UNSPECIFIED TRIMESTER: ICD-10-CM

## 2019-12-03 DIAGNOSIS — O41.00X0 OLIGOHYDRAMNIOS, ANTEPARTUM, SINGLE OR UNSPECIFIED FETUS: ICD-10-CM

## 2019-12-03 DIAGNOSIS — O26.90 PREGNANCY, ANTEPARTUM, COMPLICATIONS: ICD-10-CM

## 2019-12-03 ASSESSMENT — MIFFLIN-ST. JEOR: SCORE: 1604.52

## 2019-12-05 ENCOUNTER — COMMUNICATION - HEALTHEAST (OUTPATIENT)
Dept: FAMILY MEDICINE | Facility: CLINIC | Age: 24
End: 2019-12-05

## 2019-12-06 ENCOUNTER — HOME CARE/HOSPICE - HEALTHEAST (OUTPATIENT)
Dept: HOME HEALTH SERVICES | Facility: HOME HEALTH | Age: 24
End: 2019-12-06

## 2019-12-08 ENCOUNTER — HOME CARE/HOSPICE - HEALTHEAST (OUTPATIENT)
Dept: HOME HEALTH SERVICES | Facility: HOME HEALTH | Age: 24
End: 2019-12-08

## 2019-12-10 ENCOUNTER — RECORDS - HEALTHEAST (OUTPATIENT)
Dept: ADMINISTRATIVE | Facility: OTHER | Age: 24
End: 2019-12-10

## 2020-01-13 ENCOUNTER — OFFICE VISIT - HEALTHEAST (OUTPATIENT)
Dept: FAMILY MEDICINE | Facility: CLINIC | Age: 25
End: 2020-01-13

## 2020-01-13 DIAGNOSIS — Z23 NEED FOR HPV VACCINATION: ICD-10-CM

## 2020-01-13 DIAGNOSIS — Z30.017 ENCOUNTER FOR INITIAL PRESCRIPTION OF NEXPLANON: ICD-10-CM

## 2020-01-13 ASSESSMENT — MIFFLIN-ST. JEOR: SCORE: 1506.09

## 2020-01-13 ASSESSMENT — EDINBURGH POSTNATAL DEPRESSION SCALE (EPDS): TOTAL SCORE: 14

## 2020-02-06 ENCOUNTER — COMMUNICATION - HEALTHEAST (OUTPATIENT)
Dept: FAMILY MEDICINE | Facility: CLINIC | Age: 25
End: 2020-02-06

## 2020-04-07 ENCOUNTER — COMMUNICATION - HEALTHEAST (OUTPATIENT)
Dept: FAMILY MEDICINE | Facility: CLINIC | Age: 25
End: 2020-04-07

## 2021-05-26 VITALS
RESPIRATION RATE: 16 BRPM | TEMPERATURE: 97.9 F | DIASTOLIC BLOOD PRESSURE: 74 MMHG | OXYGEN SATURATION: 98 % | SYSTOLIC BLOOD PRESSURE: 118 MMHG | HEART RATE: 75 BPM

## 2021-05-27 NOTE — PATIENT INSTRUCTIONS - HE
Pregnancy: 5-6 weeks    Due Date: December 13, 2019    Next visit in 6 weeks  With Dr. Douglas  For Your First OB Visit

## 2021-05-27 NOTE — TELEPHONE ENCOUNTER
Patient has a future appointment on April 18, 2019 @ 1:20pm with Keily Houser for pregnancy confirmation. Completing task.

## 2021-05-27 NOTE — TELEPHONE ENCOUNTER
"#1- Left voicemail for patient at [phone] to call back and schedule appointment.    When patient calls back please help schedule pregnancy consult appointment with Keily Houser under visit type \"First OB\"  "

## 2021-05-27 NOTE — TELEPHONE ENCOUNTER
"LVM # 1 @ [phone] FOR PT TO CALL BACK TO SCHEDULE APPT WITH RENÉE HENDERSON AS \" FIRST OB FOR OFFICE TYPE. PLEASE HELP PT SCHEDULE APPT WHEN PT CLALL BACK.   "

## 2021-05-27 NOTE — TELEPHONE ENCOUNTER
New Appointment Needed  What is the reason for the visit:    Pregnancy Confirmation Appt Needed  When was the first day of your last menstrual cycle?: Mid march  Have you done a home pregnancy test?: Yes: 2 - positive.    Provider Preference: Any available  How soon do you need to be seen?: asap  Waitlist offered?:   Okay to leave a detailed message:  Yes

## 2021-05-28 NOTE — PROGRESS NOTES
[facility] EXAM NOTE      Chief Complaint   Patient presents with     Paperwork     [facility]           ASSESSMENT & PLAN    Joseph was seen today for paperwork.    Diagnoses and all orders for this visit:    Encounter for completion of form with patient: [facility] paperwork filled out for patient with the requested time off of work for if symptoms.  Will be faxed.    Nausea and vomiting, intractability of vomiting not specified, unspecified vomiting type: Discussed we want to make sure we treat the nausea and vomiting and morning sickness so that she is able to go to work instead of taking those days off.  She has Unisom and I will add on vitamin B6 we discussed taking it 3 times a day at the same time.  She is to let me know if she her symptoms do not improve and I will send something stronger.  She states understanding.  -     pyridoxine, vitamin B6, (VITAMIN B-6) 25 MG tablet; Take 1 tablet (25 mg total) by mouth 3 (three) times a day.    Bacteriuria in pregnancy: Notified patient that the Augmentin was intentional for bacteria on her urine culture.  She will complete the course.        HISTORY    Joseph Luevano is a 24 y.o.  female who presents for the following issues:    Patient presents for follow-up [facility] paperwork.  I guess previously seen for her first OB appointment with Keily and it was just filled out for her routine OB appointments.  Per the telephone encounter looks like Keily notes that patient did not mention anything about having to miss work due to nausea and vomiting or any other symptoms of pregnancy.  She notes that this is what the patient asked for.  Patient states today that she does not need [facility] paperwork for her OB appointments she gets off for those.  But she did miss several days of work due to morning sickness.  She also states that the medication that was prescribed was not correct.  So she just bought Unisom over-the-counter.  I think it was brand versus generic issue.  There is also a  question of a large pill that said it was for UTI.  On chart review I know Keily that was trying to get a hold of her to let her know that she grew some bacteria on her urine culture.  Positive for enterococcus.  And sent prescription for Augmentin for her.  Patient still has a prescription and discussed she should start taking it.  She is wondering if I can fill out the paperwork to state that she is able to take 3 days a week off of work if needed for her symptoms of morning sickness.  She also notes that she gets very nauseous when she talks on the phone a lot at work.  And she states that her computer screen is very bright and she sometimes gets headaches from this.    MEDICATIONS    Current Outpatient Medications on File Prior to Visit   Medication Sig Dispense Refill     acetaminophen (TYLENOL) 500 MG tablet Take 500-1,000 mg by mouth every 6 (six) hours as needed for pain.       doxylamine (UNISOM) 25 mg tablet Take 1/2 tablet every 6-8 hours, as needed for nausea. 35 tablet 0     prenatal vit-iron fum-folic ac (PRENATAL VITAMIN) 27 mg iron- 0.8 mg Tab tablet Take 1 tablet by mouth once daily. 100 tablet 3     No current facility-administered medications on file prior to visit.        Pertinent past medical, surgical, social and family history reviewed and updated in [employer].    Social History     Socioeconomic History     Marital status: Single     Spouse name: Not on file     Number of children: Not on file     Years of education: Not on file     Highest education level: Not on file   Occupational History     Not on file   Social Needs     Financial resource strain: Not on file     Food insecurity:     Worry: Not on file     Inability: Not on file     Transportation needs:     Medical: Not on file     Non-medical: Not on file   Tobacco Use     Smoking status: Never Smoker     Smokeless tobacco: Never Used     Tobacco comment: boyfriend smokes outside   Substance and Sexual Activity     Alcohol use: No      "Drug use: No     Comment: used to smoke marijuana     Sexual activity: Yes     Partners: Male     Birth control/protection: None   Lifestyle     Physical activity:     Days per week: Not on file     Minutes per session: Not on file     Stress: Not on file   Relationships     Social connections:     Talks on phone: Not on file     Gets together: Not on file     Attends [employer] service: Not on file     Active member of club or organization: Not on file     Attends meetings of clubs or organizations: Not on file     Relationship status: Not on file     Intimate partner violence:     Fear of current or ex partner: Not on file     Emotionally abused: Not on file     Physically abused: Not on file     Forced sexual activity: Not on file   Other Topics Concern     Not on file   Social History Narrative     Not on file         REVIEW OF SYSTEMS     Pertinent Positives and negatives in HPI.     PHYSICAL EXAM    /58 (Patient Site: Left Arm, Patient Position: Sitting, Cuff Size: Adult Regular)   Pulse (!) 102   Temp 97.8  F (36.6  C) (Oral)   Ht 5' 2.25\" (1.581 m)   Wt 198 lb 6.4 oz (90 kg)   LMP 03/08/2019 (Within Weeks)   SpO2 98%   BMI 36.00 kg/m    GEN:  24 y.o. female sitting comfortably in no apparent distress.   HEENT: EOMI, no scleral icterus, buccal mucosa moist  CHEST/LUNG: No respiratory distress  SKIN: warm, dry, no rashes or lesions  NEURO: Gait normal, coordination intact  PSYCH:  Mood and affect appropriate      At the end of the encounter, I discussed results, diagnosis, medications. Discussed red flags for immediate return to clinic/ER, as well as indications for follow up if no improvement. Patient and/or caregiver understood and agreed to plan. Patient was stable for discharge.      Sherry Douglas"

## 2021-05-28 NOTE — PROGRESS NOTES
"TCM DISCHARGE FOLLOW UP CALL    Discharge Date:  5/14/2019  Reason for hospital stay (discharge diagnosis)::  Acute appendicitis, Lap appy  Are you feeling better, the same or worse since your discharge?:  Patient is feeling better (Some pain here & there, but \"I'm ok.\"  Has had 3 BM's since home.  A little sore but able to walk & sitting better.  )  Do you feel like you have a plan in the event of a health emergency?: Yes (ER.  RN informed pt of 24/7 nurse triage)    As part of your discharge plan, were  home care services ordered for you?: No    Did you receive any new medications, or was there a change to your medications?: Yes    Are you taking those medications, or do you have any established regiment?:  RN reviewed discharge medications w/pt.  Pt states she is taking 1 Vicodin q 4-6 hours, as needed.   Do you have any follow up visits scheduled with your PCP or Specialist?:  Yes, with PCP (Dr. Douglas 5/31/19)  (RN) Is PCP appt scheduled soon enough (within 14 days of discharge date)?: Yes          Lindsay Orozco RN Care Manager, Population Health    "

## 2021-05-28 NOTE — TELEPHONE ENCOUNTER
Who is calling:  Ashlee, [employer] (Patient's employer)  Reason for Call:  Calling to confirm the duration of time for the [place] paperwork they received. Form states it is to be in place for 4 months beginning March 1st, which would make the end date July 8th. Ashlee would just like to confirm this. Please advise, thank you.   Date of last appointment with primary care:  5/7/19  Has the patient been recently seen:  Yes  Okay to leave a detailed message: Yes secure line

## 2021-05-28 NOTE — ANESTHESIA CARE TRANSFER NOTE
Last vitals:   Vitals:    05/12/19 1324   BP: 122/58   Pulse: (!) 116   Resp: 12   Temp: 37.1  C (98.8  F)   SpO2: 100%     Patient's level of consciousness is drowsy  Spontaneous respirations: yes  Maintains airway independently: yes  Dentition unchanged: yes  Oropharynx: oropharynx clear of all foreign objects    QCDR Measures:  ASA# 20 - Surgical Safety Checklist: WHO surgical safety checklist completed prior to induction    PQRS# 430 - Adult PONV Prevention: 4558F - Pt received => 2 anti-emetic agents (different classes) preop & intraop  ASA# 8 - Peds PONV Prevention: NA - Not pediatric patient, not GA or 2 or more risk factors NOT present  PQRS# 424 - Roxana-op Temp Management: 4559F - At least one body temp DOCUMENTED => 35.5C or 95.9F within required timeframe  PQRS# 426 - PACU Transfer Protocol: - Transfer of care checklist used  ASA# 14 - Acute Post-op Pain: ASA14B - Patient did NOT experience pain >= 7 out of 10

## 2021-05-28 NOTE — TELEPHONE ENCOUNTER
Yes that is correct.  Patient was recently out of work for a different reason last week.  I am not sure if new [facility] paperwork will need to be filled out for this.

## 2021-05-28 NOTE — TELEPHONE ENCOUNTER
Called and spoke with Ashlee. She wanted to know if patient gets better by 7/8/2019, can you notified them when she is okay and able to work. There is a release sign to talk to employers.

## 2021-05-28 NOTE — TELEPHONE ENCOUNTER
"Patient Returning Call  Reason for call:  Message from provider  Information relayed to patient:  Writer read the following to patient: 1. The medicines I prescribed were exactly what we discussed at her visit.  Sometimes she may get a generic form which might look different.  I don't know what other medicines she may be thinking of.     2. Because she is pregnant, she will automatically get insurance if she applies, and it goes back to cover from date of conception, so her office visits and most medicines should be covered by insurance.  She may have to submit receipts to get reimbursed for these.  We have a financial/insurance person at the clinic that she can see if she wants     3.     \"I need this FMLA due to illness and missed days due to pregnancy     I don't know what she means by this.  When I saw her we discussed FMLA to excuse her for doctor appts as needed during pregnancy.    She is 6 weeks pregnant.  She didn't mention any illnesses, she was not having any problems with morning sickness.  Patient has additional questions:  Yes  If YES, what are your questions/concerns:  She states the last OB  gave her FMLA for morning sickness.  Patient states she had missed multiple days at work due to morning sickness. Writer advised her to come in and talk to provider or mininuim call nursing when she was sick. Patient  is asking to change OB providers.  Transferred to scheduling.   Okay to leave a detailed message?: No call back needed  "

## 2021-05-28 NOTE — TELEPHONE ENCOUNTER
Question following Office Visit  When did you see your provider: 4/18/19  What is your question: Patient stated I have the following issues/questions:    I gave the provider my FMLA forms to complete and the way they were completed my FMLA was denied by my  employer. I tried to explain that I need this FMLA due to illness and missed days due to pregnancy but she completed them to say please excuse her for appointments only.  My employer gives us points when we miss work and I am in danger of losing my job due to days I already missed.     I currently do not have insurance, I had to pay out of pocket for this last  visit and if I need to schedule another visit to have these forms corrected am I to be recharged for this service with another provider?     The medication given in this appointment is not what I wanted and I paid out of pocket for this but I will not be taking these can I be reimbursed?   Okay to leave a detailed message: Yes

## 2021-05-28 NOTE — TELEPHONE ENCOUNTER
"1. The medicines I prescribed were exactly what we discussed at her visit.  Sometimes she may get a generic form which might look different.  I don't know what other medicines she may be thinking of.    2. Because she is pregnant, she will automatically get insurance if she applies, and it goes back to cover from date of conception, so her office visits and most medicines should be covered by insurance.  She may have to submit receipts to get reimbursed for these.  We have a financial/insurance person at the clinic that she can see if she wants    3.     \"I need this FMLA due to illness and missed days due to pregnancy    I don't know what she means by this.  When I saw her we discussed FMLA to excuse her for doctor appts as needed during pregnancy.    She is 6 weeks pregnant.  She didn't mention any illnesses, she was not having any problems with morning sickness.      "

## 2021-05-28 NOTE — PROGRESS NOTES
Chief Complaint:  Chief Complaint   Patient presents with     Initial Prenatal Visit     REQ: dR Patterson     HPI:   Joseph Luevano is a 24 y.o. female is here for pregnancy intake.  She is .  Patient's last menstrual period was 2019 (within weeks).  Positive home pregnancy test on 19.  Mild nausea and vomiting, Unisom worked really well last pregnancy.    Past medical history: reviewed and updated.  Past Medical History:   Diagnosis Date     Abnormal BPP 2018 = BPP 2/8     Abnormal glucose tolerance in pregnancy 3/20/2018    1hr *. 3hr GTT 76, 152, 108, 101.     Delivery by  section of full-term infant 2018     Oligohydramnios 2018     Post-term pregnancy 2018    41+ weeks     Past Surgical History:   Procedure Laterality Date     CARDIAC SURGERY      as a child      SECTION N/A 2018    Procedure:  SECTION;  Surgeon: Ming Potter MD;  Location: [facility] L[facility] OR;  Service:      LAPAROSCOPIC CHOLECYSTECTOMY N/A 2018    Procedure: CHOLECYSTECTOMY, LAPAROSCOPIC;  Surgeon: Rafi Newsome MD;  Location: [facility] OR;  Service:      KS ERCP W/SPHINCTEROTOMY/PAPILLOTOMY N/A 2018    Procedure: ENDOSCOPIC RETROGRADE CHOLANGIOPANCREATOGRAPHY, SPHINCTEROTOMY; PANCREATIC DUCT STENT PLACEMENT;  Surgeon: Malik Edwards MD;  Location: [facility] OR;  Service: Gastroenterology       Current Outpatient Medications:      acetaminophen (TYLENOL) 500 MG tablet, Take 500-1,000 mg by mouth every 6 (six) hours as needed for pain., Disp: , Rfl:      doxylamine (UNISOM) 25 mg tablet, Take 1/2 tablet every 6-8 hours, as needed for nausea., Disp: 35 tablet, Rfl: 0     prenatal vit-iron fum-folic ac (PRENATAL VITAMIN) 27 mg iron- 0.8 mg Tab tablet, Take 1 tablet by mouth once daily., Disp: 100 tablet, Rfl: 3    Social:  Social History     Tobacco Use     Smoking status: Never Smoker     Smokeless tobacco: Never Used     Tobacco comment:  boyfriend smokes outside   Substance Use Topics     Alcohol use: No     Drug use: No     Comment: used to smoke marijuana       OBJECTIVE:  No Known Allergies  Vitals:    04/18/19 1339   BP: 100/72   Pulse: 88   Resp: 16     Body mass index is 36.06 kg/m .    Vital signs stable as recorded above  General: Patient is alert and oriented x 3, in no apparent distress  Remainder of physical exam deferred to patient's First OB Visit    Results:  Results for orders placed or performed in visit on 04/18/19   Culture, Urine   Result Value Ref Range    Culture Mixture of urogenital organisms with     Culture 10,000-50,000 col/ml Enterococcus species (!)    Pregnancy (Beta-hCG, Qual), Urine   Result Value Ref Range    Pregnancy Test, Urine Positive (!) Negative   ABO/RH Typing (OP order)   Result Value Ref Range    HML ABO/Rh Typing B POS     HML ABO/Rh Repeat Typing B POS    Hepatitis B Surface antigen (HBsAG)   Result Value Ref Range    Hepatitis B Surface Ag Negative Negative   HIV Antigen/Antibody Screening Cascade   Result Value Ref Range    HIV Antigen / Antibody Negative Negative   HML Antibody Screen   Result Value Ref Range    HML Antibody Screen Negative Negative   Lead, Blood   Result Value Ref Range    Lead  <5.0 ug/dL    Collection Method Venous     Lead Retest No    RPR   Result Value Ref Range    Treponema Antibody (Syphilis) Negative Negative   Rubella Immune Status (IgG)   Result Value Ref Range    Rubella Antibody, IgG Positive    Drugs of Abuse 1,Urine   Result Value Ref Range    Amphetamines Screen Negative Screen Negative    Benzodiazepines Screen Negative Screen Negative    Opiates Screen Negative Screen Negative    Phencyclidine Screen Negative Screen Negative    THC Screen Negative Screen Negative    Barbiturates Screen Negative Screen Negative    Cocaine Metabolite Screen Negative Screen Negative    Oxycodone Screen Negative Screen Negative    Creatinine, Urine 233.7 mg/dL   Urinalysis, OB Screen    Result Value Ref Range    Glucose, UA Negative Negative    Ketones, UA 40 mg/dL (!) Negative    Protein, UA Negative Negative mg/dL   HM1 (CBC with Diff)   Result Value Ref Range    WBC 12.0 (H) 4.0 - 11.0 thou/uL    RBC 4.26 3.80 - 5.40 mill/uL    Hemoglobin 13.5 12.0 - 16.0 g/dL    Hematocrit 41.7 35.0 - 47.0 %    MCV 98 80 - 100 fL    MCH 31.7 27.0 - 34.0 pg    MCHC 32.4 32.0 - 36.0 g/dL    RDW 12.8 11.0 - 14.5 %    Platelets 267 140 - 440 thou/uL    MPV 10.7 8.5 - 12.5 fL    Neutrophils % 68 50 - 70 %    Lymphocytes % 23 20 - 40 %    Monocytes % 6 2 - 10 %    Eosinophils % 2 0 - 6 %    Basophils % 0 0 - 2 %    Neutrophils Absolute 8.1 (H) 2.0 - 7.7 thou/uL    Lymphocytes Absolute 2.8 0.8 - 4.4 thou/uL    Monocytes Absolute 0.7 0.0 - 0.9 thou/uL    Eosinophils Absolute 0.3 0.0 - 0.4 thou/uL    Basophils Absolute 0.1 0.0 - 0.2 thou/uL   Lead, Blood, Venouos   Result Value Ref Range    Lead, Blood (Venous) <2.0 0.0 - 4.9 ug/dL     Other screening pregnancy lab work is ordered and pending.    Patient scored a 7/30 on [place]  Depression Screen.    Assessment and Plan:  1. Pregnancy Intake Appointment.  Joseph Luevano is 24 y.o. and .  Patient's last menstrual period was 2019 (within weeks).  Estimated Date of Delivery: 19  She will be seeing Dr. Douglas for OB care.  Screening pregnancy lab work was drawn.  Prenatal vitamins prescribed.  Problem list and current medications reviewed and updated.  Dating ultrasound ordered.  Prenatal info packet given.  First trimester genetic screening test (NT U/S) was discussed with patient.    She is undecided.  She will let us know if she would like to get this done.      2. Personal History of congenital heart defect.  Cleared by cardiology during previous pregnancy.  She had a normal cardiac echo.  Plan to get Level II ultrasound at anatomic survey.    Follow up in 6 weeks.  Please see OB Episode for complete details.  Visit was approximately 30  minutes, greater than 50% of time spent in face-to-face counseling and coordination of care.    This dictation uses voice recognition software, which may contain typographical errors.

## 2021-05-28 NOTE — ANESTHESIA POSTPROCEDURE EVALUATION
Patient: Joseph Luevano  APPENDECTOMY, LAPAROSCOPIC  Anesthesia type: general    Patient location: PACU  Last vitals:   Vitals Value Taken Time   /56 5/12/2019  2:51 PM   Temp 36.9  C (98.5  F) 5/12/2019  2:51 PM   Pulse 78 5/12/2019  2:51 PM   Resp 18 5/12/2019  2:51 PM   SpO2 95 % 5/12/2019  2:51 PM     Post vital signs: stable  Level of consciousness: awake and responds to simple questions  Post-anesthesia pain: pain controlled  Post-anesthesia nausea and vomiting: no  Pulmonary: unassisted, return to baseline  Cardiovascular: stable and blood pressure at baseline  Hydration: adequate  Anesthetic events: no    QCDR Measures:  ASA# 11 - Roxana-op Cardiac Arrest: ASA11B - Patient did NOT experience unanticipated cardiac arrest  ASA# 12 - Roxana-op Mortality Rate: ASA12B - Patient did NOT die  ASA# 13 - PACU Re-Intubation Rate: ASA13B - Patient did NOT require a new airway mgmt  ASA# 10 - Composite Anes Safety: ASA10A - No serious adverse event    Additional Notes:

## 2021-05-28 NOTE — ANESTHESIA PREPROCEDURE EVALUATION
Anesthesia Evaluation      Patient summary reviewed   No history of anesthetic complications     Airway   Mallampati: I  Neck ROM: full   Pulmonary - negative ROS and normal exam    breath sounds clear to auscultation  (-) not a smoker                         Cardiovascular - negative ROS  Exercise tolerance: > or = 4 METS  (-) murmur  Rhythm: regular  Rate: normal,    no murmur   ROS comment: Hx of congenital heart defect s/p repair     Neuro/Psych - negative ROS     Endo/Other    (+) obesity (BMI 35.47), pregnant     GI/Hepatic/Renal - negative ROS      Other findings:  at 9w2d with acute appendicitis. OB consulted. US obtained at -190 bpm.     Labs:  19:  WBC 18.3, Hgb 13.2, Plt 270  Na 139, K 3.6, BUN 9, Cr 0.64      Dental - normal exam                        Anesthesia Plan  Planned anesthetic: general endotracheal  GETA.  NO midazolam, nitrous oxide or ketorolac 2/2 pregnancy.  High risk for PONV and plan for scopolamine patch, dexamethasone, zofran and low-dose propofol gtt (25-50 mcg/kg/min).    ASA 2   Induction: intravenous   Anesthetic plan and risks discussed with: patient  Anesthesia plan special considerations: antiemetics,   Post-op plan: routine recovery

## 2021-05-29 NOTE — PROGRESS NOTES
First OB   Feeling okay.   Still a little nausea  Okay appetite  No pelvic pain  Concerned that FMLA is only through July 8th but I reassured that we would just need to reassess at that time.     Follow-up appendectomy: The weekend following our last visit patient started to have pain in her right lower quadrant and went to the emergency room.  She was found to have appendicitis and underwent laparoscopic surgery on 5/12.  She reports initially had significant pain but now the pain is gone from the surgery.  She is overall doing well.  Denies any fevers, abdominal pain, diarrhea or constipation.  No nausea or vomiting more than her usual morning sickness.   She does report some sleep issues.  Taking half tab doxylamine and pain med to help her sleep.  Still has bandages on said they be removed today    Taking PNV: Intermittently  Labs/imaging reviewed: enterococcus bacteruria, s/p tx.   Discussed screening for aneuploidy and neural tube defects, SMA and CF.  Declines     Preformed physical exam and abdominal bandages removed  Reviewed DANTE, past medical history, past surgical history, family history, genetic history, and previous obstetrical history.   Encouraged good nutrition, well balanced diet, regular activity.  Discussed foods to avoid.  And other applicable safety/health risks in pregnancy.    Joseph was seen today for routine prenatal visit.    Diagnoses and all orders for this visit:    First trimester pregnancy  -     Urinalysis, OB Screen    Postoperative examination: doing well from a post op perspective. Continue pain meds as needed. Encouraged full tab of Unisom to help her sleep. Bandages removed today and wound sites are clean dry and intact. Discussed okay to shower. F/u as needed.     F/u in 4 weeks.     Sherry Douglas

## 2021-05-30 NOTE — PROGRESS NOTES
No ctx, lof, bleeding, or discharge.  No HA or vision changes.  Good FM : yes, a little  Still with nausea here and there. Wasn't able to  Reglan and now has switched pharmacies.  Fainting episodes have resolved.    Joseph was seen today for routine prenatal visit.    Diagnoses and all orders for this visit:    Encounter for supervision of low-risk pregnancy in second trimester  -      OB > = 14 Weeks    Nausea and vomiting during pregnancy:  Re prescribe.   -     metoclopramide (REGLAN) 5 MG tablet; Take 1 tablet (5 mg total) by mouth 3 (three) times a day.    Handout given.   Anatomy ultrasound ordered.  Follow-up in 4 weeks.    Sherry Douglas

## 2021-05-30 NOTE — PROGRESS NOTES
"No ctx, lof, bleeding, or discharge.  No HA or vision changes.  Good FM : not yet  Episodes of feeling Out of breath. If standing for a long time. Happening here and there. Has to sit down and catch her breath.\"Ate my pills\" and then I feel better. Not eating enough sugar?  vison becomes spotty then dark. Feels like energy is drained out of her body. feesls like she will pass out.     Did not take pills for the bacteria previous because she had surgery - had antibx with surgery so didn't take it. Thought it might go away with those antibiotics.     Nausea vomiting and headache persistent. Still missing work a couple days a week. Has vomited at work.       Joseph was seen today for routine prenatal visit and nausea.    Diagnoses and all orders for this visit:    16 weeks gestation of pregnancy: will order anatomy US next visit  -     Urinalysis, OB Screen (ketones, glucose, protein only)    Bacteriuria in pregnancy: SARAH   -     Culture, Urine    Nausea and vomiting during pregnancy: will bump up therapy to Reglan. She is to let me know by next week if this is not helping and we will switch to Zofran. I will fill out [place] paperwork again to extend the restrictions.   -     metoclopramide (REGLAN) 5 MG tablet; Take 1 tablet (5 mg total) by mouth 3 (three) times a day.    Pre-syncope:  Discussed that we do see this is pregnancy and why this is more likely to occur. Usually vasovagal but can be 2/2 hypoglycemia. Discussed small frequent meals throughout the day and staying well hydrated. Treat N/V as above and this should improve symptoms. Discussed not being on feet for a long period of time. She is to let me know if she has any other symptoms. Will check hgb as below.   -     Hemoglobin    Handout given  F/u in 4 weeks.     Sherry Douglas  "

## 2021-05-30 NOTE — PATIENT INSTRUCTIONS - HE
"Patient Education   HEALTHY PREGNANCY CARE: 18-22 WEEKS PREGNANT    Your baby is continuing to develop quickly. At this stage, babies can now suck their thumbs,  firmly with their hands, and are beginning to hear.    Sometime between 18 and 22 weeks, you will start to feel your baby move. At first, these small fetal movements feel like fluttering or \"butterflies.\" Some women say that they feel like gas bubbles. As the baby grows, these movements will become stronger and be able to be felt through your abdomen.     Nutrition: During this time, you may find that your nausea and fatigue are gone. Overall, you may feel better and have more energy than you did in your first trimester. Be sure you are getting enough calcium and iron in your diet. Your prenatal vitamins cannot supply all of the nutrients you need, so continue to eat 3-4 servings of dairy foods and 2-3 servings of meat/fish/poultry/nuts every day. Foods high in iron include: red meats, eggs, dark green vegetables, dark yellow vegetables, nuts, kidney beans and chickpeas. Some cereals are fortified with iron, so look at the food labels for 100% of the daily requirement for iron.     [place] for childbirth and parenting classes, including an infant CPR class. Breastfeeding classes are recommended too.    Plan for the gestational diabetes screening between weeks 24-28. You can eat normally before that visit; we would suggest making sure you have protein foods, but not a lot of carbohydrates or sugary foods.    Your blood type was determined at your first OB appointment. If you are Rh negative, you should discuss the need for a Rhogam shot with your midwife or physician.     If you had a  birth in the past, discuss a trial of labor with your midwife or physician. He or she may ask that you obtain your operative report from that  if you are wanting to have a vaginal birth after  () this time.     Think about the support you have, " and what help you can plan on from family and friends, after your baby is born. Many mothers and babies are ready to go home from the hospital within a few days. Your clinic staff is available to assist you and the [facility] staff is available to you after hours. Start preparing your other children for changes they'll experience with the new baby. Explore day care options.    You may find that you have new discomforts now, such as sleep problems or leg cramps. To access information that can help you ease these discomforts, you can refer to the Starting Out Right book or find it online at [email or web address] or [email or web address]/images/[email or web address]/[email or web address]-out-[email or web address]-starting-out-[email or web address]#[email or web address]    You can sign up for a weekly parenting e-mail that gives support, tips and advice from health care professionals that starts with pregnancy and continues through the toddler years. To register, go to [email or web address] at any time during your pregnancy.    Watch for the warning signs of premature labor:     Dull, low backache    Contractions of the uterus, menstrual-like cramps    Abdominal cramping with or without diarrhea    More pelvic pressure    Increase or change in vaginal discharge.     Remember that labor doesn't have to hurt. Never hesitate to call your midwife or physician or their staff at [facility] FAMILY MEDICINE/OB at Phone: [phone] for any one of these warning signs - or if something just doesn't feel right. If it's after clinic hours, physician patients should call the [facility] at [phone]); midwife patients should call their answering service at [phone].

## 2021-05-31 VITALS — BODY MASS INDEX: 31.74 KG/M2 | WEIGHT: 179.19 LBS

## 2021-05-31 VITALS — BODY MASS INDEX: 31.53 KG/M2 | WEIGHT: 178 LBS

## 2021-05-31 VITALS — WEIGHT: 175 LBS | BODY MASS INDEX: 31 KG/M2

## 2021-05-31 VITALS — WEIGHT: 179 LBS | BODY MASS INDEX: 31.71 KG/M2 | HEIGHT: 63 IN

## 2021-05-31 VITALS — WEIGHT: 182 LBS | BODY MASS INDEX: 32.25 KG/M2 | HEIGHT: 63 IN

## 2021-05-31 VITALS — WEIGHT: 181 LBS

## 2021-05-31 VITALS — HEIGHT: 63 IN | BODY MASS INDEX: 31.71 KG/M2 | WEIGHT: 179 LBS

## 2021-05-31 NOTE — PROGRESS NOTES
No ctx, lof, bleeding, or discharge.  No HA or vision changes.  Good FM: Yes  Reports significant back pain more on her left side.,  Along her flank and SI joint into the buttocks.  She tried her maternity belt for last pregnancy was not helpful. She has hx of cholecystectomy and appendectomy. Hard to find comfortable position at night.   She has a question about if doctors will do four C-sections.  Taking some doxylamine intermittently for sleep.  Does get some headaches feels nauseous may be migraines had some more as the other day.  Not sure due to heat.  Reglan is working but will take 1/2-hour to kick in.  Getting the nausea and vomiting random times now.  Only needs about 1 day off a week if needed.  Has new FMLA paperwork for me today to fill out.    Labs/imaging reviewed: Its a boy!    Joseph was seen today for routine prenatal visit and back pain.    Diagnoses and all orders for this visit:    Encounter for supervision of low-risk pregnancy in second trimester    Chronic bilateral low back pain without sciatica: will fill out FMLA paperwork. She says this back pain can be quite severe some days and is asking for something stronger for pain so she can go to work on these days. Offered chiropractor as well but would like to try pain med first. SHe has already looked up prenatal yoga online and trying to stretch and walk. Discussed risks of opioid medications in pregnancy and limiting use to just the most severe days. Short prescription given. F/u in 1 month to reassess.   -     acetaminophen-codeine (TYLENOL #3) 300-30 mg per tablet; Take 1 tablet by mouth every 6 (six) hours as needed for pain.      Handout given  F?u in 4 weeks. Labs and tdap next visit.     Sherry Douglas

## 2021-05-31 NOTE — PATIENT INSTRUCTIONS - HE
Patient Education   HEALTHY PREGNANCY CARE: 22-26 WEEKS PREGNANT    You are finishing your second trimester. Your baby is developing rapidly. At this stage, babies have a sense of balance, can respond to touch, and are recognizing parent voices.  Your baby will be moving around more now.  You may notice [place] contractions now, which are painless and prepare the uterus for the delivery.    Nutrition: During this time, you may find that your nausea and fatigue are gone. Overall, you may feel better and have more energy than you did in your first trimester. Be sure you are getting enough calcium and iron in your diet. Your prenatal vitamins cannot supply all of the nutrients you need, so continue to eat 3-4 servings of dairy foods and 2-3 servings of meat/fish/poultry/nuts every day. Foods high in iron include: red meats, eggs, dark green vegetables, dark yellow vegetables, nuts, kidney beans and chickpeas. Some cereals are fortified with iron, so look at the food labels for 100% of the daily requirement for iron.     Discuss your work situation with your midwife or physician as needed. If you stand for long periods of time, you may need to make changes and take breaks.    [place] for childbirth and parenting classes, including an infant CPR class. Breastfeeding classes are recommended too.    Plan for the gestational diabetes screening between weeks 24-28. You can eat normally before that visit; we would suggest making sure you have protein foods, but not a lot of carbohydrates or sugary foods.    Your blood type was determined at your first OB appointment. If you are Rh negative, you should discuss the need for a Rhogam shot with your midwife or physician. This would be administered around 28 weeks if necessary.    How can you care for yourself at home?   You can refer to the Starting Out Right book or find it online at [email or web address]/[email or web address]/[email or web address]-Out-[email or web address] or  "[email or web address].[email or web address]/images/stories/flipbooks/[facility]-starting-out-right/[facility]-starting-out-right.html#p=8     You can sign up for a weekly parenting e-mail that gives support, tips and advice from health care professionals that starts with pregnancy and continues through the toddler years. To register, go to [email or web address] at any time during your pregnancy.    Watch for the warning signs of premature labor:   \" Dull, low backache  \" Contractions of the uterus, menstrual-like cramps  \" Abdominal cramping with or without diarrhea  \" More pelvic pressure  \" Increase or change in vaginal discharge.     Continue to watch for signs and symptoms of preeclampsia:   \" Sudden swelling of your face, hands, or feet   \" New vision problems such as blurring, double vision, or flashing lights  \" A severe headache not relieved with acetaminophen (Tylenol)  \" Sharp or stabbing pain in your right or middle upper abdomen    Remember that labor doesn't have to hurt. Never hesitate to call your midwife or physician or their staff at [facility] FAMILY MEDICINE/OB at Phone: [phone] for any one of these warning signs - or if something just doesn't feel right. If it's after clinic hours, physician patients should call the [facility] at [phone] ([phone]); midwife patients should call their answering service at [phone].      Baby Feeding in the Hospital: Information, Support and Resources    As you prepare for the birth of your child, you will want to consider options for feeding your baby including breast-feeding and/or baby formula. The American Academy of Pediatrics recommends exclusive breast-feeding for the first six months (although any amount of breast-feeding is beneficial).  However, we also understand that breast-feeding is a personal choice and not for everyone. Whether or not you choose to breast-feed, your decision will be respected by our staff.    There are numerous benefits of " breast-feeding; here are a few to consider:    Provides antibodies to protect your baby from infections and diseases    The cost: formula can cost over $1,500 per year    Convenience, no warming up or sterilizing bottles and supplies    The physical contact with breastfeeding can make babies feel secure, warm and comforted    What ever my feeding choice, what can I expect after I deliver my baby?    Your baby will usually be placed skin-to-skin immediately following birth. The skin to skin contact between you and your baby will be a special and memorable time. The bonding and attachment comforts your baby and has a positive effect on baby s brain development.     Having your baby  room in  with you also helps you start to learn your baby s body rhythms and sleep cycle.      You will also begin to learn your baby s cues (signals) that he or she is ready to feed.    When do I start to feed my baby?  As soon as possible after your baby s birth, you will be encouraged to begin feeding.  In the first couple of weeks, your baby will eat often.  Breastfeeding babies usually eat at least 8 times in 24 hours.  Babies fed formula usually eat at least 7 times in 24 hours.      Breast-feeding tips:    Get comfortable and use pillows for support.    Have your baby at the level of your breast, facing you,  tummy to tummy .      Touch your nipple to your baby s lips so you baby s mouth opens wide (rooting reflex).  Aim the nipple toward the roof of your baby s mouth. When your baby opens his or her mouth, pull your baby toward your breast to help your baby  latch on  to your nipple and much of the areola area.    Hand expressing your breast milk can assist with latching your baby to your breast, if needed.    Ask for help, breastfeeding may seem awkward or uncomfortable at first, this is normal. There are numerous resources available at [facility], Clinics and beyond.     If your goal is to exclusively breastfeed, avoid  using any formula or artificial nipples (including bottles and pacifiers) while you are your baby are learning to breastfeed unless there is a medical reason.       Mixing breastfeeding and formula can interfere with how you begin building your milk supply.  It can impact how you and your baby  learn  to breastfeeding together and alter the natural growth of  good  bacteria in your baby s stomach.    Delay a pacifier or a bottle in the first few weeks until breastfeeding is well established. This is often around 3 weeks of age.    Ask your nurse to show you how to hand express.   Breast milk can be kept in the refrigerator or freezer for later use.    Hospital Resources:  [facility] Lactation Clinics located at [facility], [facility] and [facility]  Call: [phone].    Inpatient support    Outpatient appointments    Telephone consultation    Breast-feeding classes available through gate5      Online Resources:    [email or web address] sign up for free online weekly e-mail    [email or web address]/maternity    Breastfeedingmadesimple.com    Llli.org (La Leche League)    Normalfed.com    Womenshealth.gov/breastfeeding    Workandpump.com    Breast-feeding Supplies & Pumps:  Talk to your insurance provider or WIC (Women, Infants and Children) to learn more about options available to you. Recent health insurance changes may include additional coverage for supplies and pumps.    Public Health:  Women, Infants and Children Nutrition program (WIC): provides breast-feeding support and education in addition to formal feeding moms. 762-YWP-7468 or [email or web address]://www.health.[facility].mn.us/divs/fh/wic    Family Health Home Visiting: Public Health Nurse home visits are available. Talk to your provider to see if you qualify. Most [facility] have a program available.    Additional Resources:  La Leche League is an international, nonprofit, nonsectarian organization offering information, education,  and support to mothers who want to breast-feed their babies. Local groups offer phone help and monthly meetings. Visit [employer].org or [employer].org and us the  Find local support  drop down menu or click on the  Resources  tab.    [place] Breastfeeding Resources: 0-336-954-BABY (3676) toll free    National Breastfeeding Help Line trained breastfeeding peer counselors can help answer common breast-feeding questions by phone. Monday-Friday: English/Ukrainian  0-167- 984-8918 toll free, 1-697.826.9251 (TTY)    [facility] Connection: [phone]-[facility] (2913)

## 2021-06-01 VITALS — BODY MASS INDEX: 32.95 KG/M2 | WEIGHT: 186 LBS

## 2021-06-01 VITALS — WEIGHT: 198 LBS | BODY MASS INDEX: 36.44 KG/M2 | HEIGHT: 62 IN

## 2021-06-01 VITALS — WEIGHT: 198 LBS | BODY MASS INDEX: 38.35 KG/M2

## 2021-06-01 VITALS — BODY MASS INDEX: 38.16 KG/M2 | WEIGHT: 197 LBS

## 2021-06-01 VITALS — WEIGHT: 181.13 LBS | BODY MASS INDEX: 33.13 KG/M2

## 2021-06-01 VITALS — WEIGHT: 188 LBS | BODY MASS INDEX: 33.3 KG/M2

## 2021-06-01 VITALS — BODY MASS INDEX: 37.89 KG/M2 | HEIGHT: 60 IN | WEIGHT: 193 LBS

## 2021-06-01 VITALS — WEIGHT: 194 LBS | BODY MASS INDEX: 38.09 KG/M2 | HEIGHT: 60 IN

## 2021-06-01 VITALS — WEIGHT: 195 LBS | HEIGHT: 60 IN | BODY MASS INDEX: 38.28 KG/M2

## 2021-06-01 VITALS — HEIGHT: 60 IN | WEIGHT: 200 LBS | BODY MASS INDEX: 39.27 KG/M2

## 2021-06-01 VITALS — WEIGHT: 192.5 LBS | BODY MASS INDEX: 34.1 KG/M2

## 2021-06-01 NOTE — PROGRESS NOTES
No ctx, lof, bleeding, or discharge.  No HA or vision changes.  Good FM: Yes  Saw colleague week and a half ago for 1 hour Glucola test this came back normal.  She scheduled this appointment to follow-up with me regarding her low back pain.  She is wondering if I can change FMLA paperwork to say she can start maybe later in the day when she wakes up with back pain.  Would like to start at 10:00AM.  This is per the recommendations of her HR.  And may be increasing the amount of days per week or couple weeks.  As she took almost every day off last week apparently due to this low back pain.  She is planning to see a chiropractor per Dr. Mcelroy's recommendation.  She states otherwise she is doing quite well.    Joseph was seen today for routine prenatal visit and back pain.    Diagnoses and all orders for this visit:    Hx of  section complicating pregnancy  -     Ambulatory referral to Obstetrics / Gynecology    Low back pain during pregnancy in third trimester      [place] paperwork reprinted and will fax it and with the changes.  Encouraged her to do a chiropractor and then consider physical therapy.  Lumbar support belt was given last visit she is not sure how much is really helping.  We also discussed referral to OB/GYN at this time to discuss repeat .  Okay to see them in about 3 weeks.    Handout given    Follow-up in 3 weeks here.    Sherry Douglas

## 2021-06-01 NOTE — PROGRESS NOTES
1hr GTT today.  Pt noting increased LBP.  Notes decreased fetal movement but daily.  Hard to sleep at night due to pain. Hard to walk at times due to pain and has to limp.  Hard to sit/stand, etc due to pain.  Missing work at times.  Has tried massage, pillows, etc.  Tried T#3 but doesn't really help.  Back pain will shoot up her back and cause headaches at times.  Does note some cramping and tightening for short time but not daily- worse on days her back really hurts.  No bleeding.  No LOF.  Clear d/c stable.  Recommend kick counts and NST today.  Recommend chiropractic cares.  Consider abd binder in future or PT.  Flu and adacel given today.

## 2021-06-01 NOTE — TELEPHONE ENCOUNTER
Dr. Douglas was out for delivery this morning. I called the patient to reschedule her appointment she asked to please be seen today.  So I placed her on Dr. Mcelroy schedule today at 120pm

## 2021-06-01 NOTE — PROGRESS NOTES
Fetal Non-Stress Test  [facility]  Performed on 9/26/2019 at 28w5d.    Indication: decreased fetal movement perceived    FHT:  Baseline: 140 bpm, Variability: Moderate (6 - 25 bpm), Accelerations: Present and Decelerations: Absent  CONTRACTIONS:  none    Assessment/Plan  Category 1, reactive and reassuring      Shikha Mcelroy

## 2021-06-02 VITALS — WEIGHT: 188.3 LBS | HEIGHT: 62 IN | BODY MASS INDEX: 34.65 KG/M2

## 2021-06-02 NOTE — PROGRESS NOTES
"No ctx, lof, bleeding, or discharge.  No HA or vision changes.  Good FM : yes  Cracking in pelvis when lifts leg.   N/V back. Morning sickness. Not as bad as early pregnancy  Taking Reglan again.   Scheduled C/S December 10th Dr. Potter @ 7:30AM    Joseph was seen today for routine prenatal visit.    Diagnoses and all orders for this visit:    Encounter for supervision of low-risk pregnancy in third trimester: Reviewed that the morning sickness can return in the third trimester and that she is \"not crazy\" because she felt this way.  Okay to take Reglan as needed.  Follow-up if worsening.  -     Urinalysis, OB Screen    Low back pain during pregnancy in third trimester: Seems to be SI joint pain on exam.  She would like to still try chiropractor first before physical therapy but offer this as well.  We will see her back in 2 weeks if no improvement referral to physical therapy.    Handout given  Follow-up in 2 weeks.    Sherry Douglas    "

## 2021-06-02 NOTE — TELEPHONE ENCOUNTER
Called patient to inform her that her [facility] paperwork is completed and has been faxed. There was no answer and no voicemail set up. Will mail a copy to patient for her record. Will attempt to call patient again.

## 2021-06-02 NOTE — PATIENT INSTRUCTIONS - HE
Stacey Ott chiropractor      Patient Education   HEALTHY PREGNANCY CARE: 30-34 WEEKS PREGNANT    You have made it to the final months of your pregnancy. By now, your baby is starting to fill out with some fat under his skin, fuzzy hair on his shoulders, and is gaining 4 to 6 ounces per week.    Discuss any travel plans with your midwife or physician.    Review possible changes in sexuality during later pregnancy and discuss these with your midwife or physician, as well as your partner. Alternative love-making positions may be more comfortable.    Discuss labor and delivery issues with your midwife or physician. If you had a  birth in the past, discuss a trial of labor with your midwife or physician. He or she may ask that you sign a consent form, if you wish to have a vaginal birth after  (). Ask your midwife or physician to explain your options for managing pain during your labor and delivery. Sometimes, during the birth process, an episiotomy may need to be cut in the vagina to make the opening bigger or let the baby come out quicker. You may want to discuss the episiotomy and how often it is needed with your midwife or physician.    Plan for your baby's care by selecting a child health care provider (Family physician, Pediatrician, or Pediatric Nurse Practitioner). Practice installing an infant car seat correctly in the car. Ask for car seat information as needed and make sure it is safe and will work in the car your baby will ride in. You will need a car seat to bring your baby home from the hospital. Check the procedure for adding your baby to your health care plan. Review your decision about circumcision and ask for any information you need. As you buy and receive items for your baby, don't put a baby walker on your list. Walkers can be dangerous and can cause serious injury to your child. A safer option is a saucer-type play station, since it doesn't allow baby to travel across the  floor.    Discuss your choices and plans for birth control with your midwife or physician. Women who are breastfeeding can still become pregnant. Use a birth control method if you want to lower your pregnancy risk. Talk to your midwife or physician if you are considering permanent birth control, such as tubal ligation or Essure. You may need to complete a consent form 30 days prior to delivery in order to have this done after you deliver.    Continue to watch for signs and symptoms of preeclampsia:     Sudden swelling of your face, hands, or feet     New vision problems such as blurring, double vision, or flashing lights    A severe headache not relieved with acetaminophen (Tylenol)    Sharp or stabbing pain in your right or middle upper abdomen    Watch for signs and symptoms of premature labor:     Regular contractions. This means having about 6 or more within 1 hour, even after you have had a glass of water and are resting.     A backache that starts and stops regularly.    An increase or change in vaginal discharge, such as heavy, mucus-like, watery, or bloody discharge.     Your water breaks or leaks.    If you have any of the above symptoms or any other concerns, call your provider or their clinic staff at [facility] FAMILY MEDICINE/OB at Phone: [phone]. If it's after clinic hours, physician patients should call the Care Connection at [phone] ([phone]); midwife patients should call their answering service at [phone].    How can you care for yourself at home?   You can refer to the Starting Out Right book or find it online at [email or web address]/stories/[email or web address]-[email or web address]-Right.pdf or http://www.[facility].org/images/stories/flipbooks/[facility]-starting-out-right/[facility]-starting-out-right.html#p=8     You can sign up for a weekly parenting e-mail that gives support, tips and advice from health care professionals that starts with pregnancy and continues  through the toddler years. To register, go to [email or web address] at any time during your pregnancy.

## 2021-06-03 VITALS
HEART RATE: 84 BPM | WEIGHT: 199 LBS | HEIGHT: 62 IN | DIASTOLIC BLOOD PRESSURE: 60 MMHG | BODY MASS INDEX: 36.62 KG/M2 | SYSTOLIC BLOOD PRESSURE: 100 MMHG

## 2021-06-03 VITALS — WEIGHT: 190.5 LBS | BODY MASS INDEX: 35.06 KG/M2 | HEIGHT: 62 IN

## 2021-06-03 VITALS
HEIGHT: 62 IN | WEIGHT: 201 LBS | DIASTOLIC BLOOD PRESSURE: 64 MMHG | SYSTOLIC BLOOD PRESSURE: 108 MMHG | BODY MASS INDEX: 36.99 KG/M2 | HEART RATE: 104 BPM

## 2021-06-03 VITALS
SYSTOLIC BLOOD PRESSURE: 100 MMHG | HEART RATE: 100 BPM | WEIGHT: 198 LBS | BODY MASS INDEX: 36.21 KG/M2 | DIASTOLIC BLOOD PRESSURE: 58 MMHG

## 2021-06-03 VITALS — HEIGHT: 62 IN | WEIGHT: 194 LBS | BODY MASS INDEX: 35.7 KG/M2

## 2021-06-03 VITALS — WEIGHT: 198.4 LBS | HEIGHT: 62 IN | BODY MASS INDEX: 36.51 KG/M2

## 2021-06-03 VITALS — BODY MASS INDEX: 36.57 KG/M2 | WEIGHT: 198.75 LBS | HEIGHT: 62 IN

## 2021-06-03 VITALS — WEIGHT: 195 LBS | BODY MASS INDEX: 35.88 KG/M2 | HEIGHT: 62 IN

## 2021-06-03 VITALS — BODY MASS INDEX: 35.33 KG/M2 | WEIGHT: 193.19 LBS

## 2021-06-03 VITALS
TEMPERATURE: 98.1 F | DIASTOLIC BLOOD PRESSURE: 62 MMHG | WEIGHT: 198 LBS | HEART RATE: 88 BPM | BODY MASS INDEX: 36.21 KG/M2 | SYSTOLIC BLOOD PRESSURE: 112 MMHG

## 2021-06-03 VITALS — BODY MASS INDEX: 35.7 KG/M2 | HEIGHT: 62 IN | WEIGHT: 194 LBS

## 2021-06-03 NOTE — ANESTHESIA PROCEDURE NOTES
Peripheral Block    Patient location during procedure: OR  Start time: 12/3/2019 9:02 PM  End time: 12/3/2019 9:06 PM  post-op analgesia per surgeon order as noted in medical record  Staffing:  Performing  Anesthesiologist: Leonides Fulton MD  Preanesthetic Checklist  Completed: patient identified, site marked, risks, benefits, and alternatives discussed, timeout performed, consent obtained, at patient's request, airway assessed, oxygen available, suction available, emergency drugs available and hand hygiene performed  Peripheral Block  Block type: other, TAP  Prep: ChloraPrep  Patient position: supine  Patient monitoring: cardiac monitor, continuous pulse oximetry, heart rate and blood pressure  Laterality: bilateral  Injection technique: ultrasound guided    Ultrasound used to visualize needle placement in proximity to nerve being blocked: yes   US used to visualize anesthetic spread  Visualized anatomic structures normal  No Pathological Findings  Permanent ultrasound image captured for medical record  Sterile gel and probe cover used for ultrasound.  Needle  Needle type: Stimuplex   Needle gauge: 20G  Needle length: 4 in  no peripheral nerve catheter placed  Assessment  Injection assessment: no difficulty with injection, negative aspiration for heme, no paresthesia on injection and incremental injection

## 2021-06-03 NOTE — PROGRESS NOTES
No ctx, lof, bleeding, or discharge.  No HA or vision changes.  Good FM : yes  Lots of low back pain and some cramping this weekend. Getting uncomfortable. No LOF.     Labs/imaging reviewed: Growth US today showing EFW= 86%. SIERRA low at 3.6 but MVP is 2.6    Joseph was seen today for routine prenatal visit.    Diagnoses and all orders for this visit:    Encounter for supervision of low-risk pregnancy in third trimester    SIERRA (amniotic fluid index) borderline low: boderline low. Per new guidelines not oligo based on MVP. Will refer to [facility] for repeat US later this week Thursday or Friday. If low will need BPP. C/S planned for next Tuesday.   -     Ambulatory referral to [facility]- Pregnancy    Hx of  section complicating pregnancy    F/u Csection next Tuesday 12/10.     Sherry Douglas

## 2021-06-03 NOTE — ANESTHESIA PREPROCEDURE EVALUATION
Anesthesia Evaluation      Patient summary reviewed   No history of anesthetic complications     Airway   Mallampati: II  Neck ROM: full   Pulmonary - negative ROS and normal exam    breath sounds clear to auscultation  (-) not a smoker                         Cardiovascular - negative ROS  Exercise tolerance: > or = 4 METS  (-) murmur  Rhythm: regular  Rate: normal,    no murmur   ROS comment: Hx of congenital heart defect s/p repair     Neuro/Psych - negative ROS     Endo/Other    (+) obesity (BMI 35.47), pregnant     GI/Hepatic/Renal - negative ROS      Other findings: Post-term pregnancy 41+ weeks  Delivery by  section of full-term infant   Abnormal glucose tolerance in pregnancy  Oligohydramnios   Congenital heart defect, surgically repaired at age 4. Was seen by cardiologist in 2017, it was unclear at that time what the original defect was (?patient thinks something involving R side of heart). Normal echo. No symptoms. No follow up needed.  Marijuana abuse in remission Stopped when she became pregnant.  Appendicitis  s.p appendectomy      Dental - normal exam                          Anesthesia Plan  Planned anesthetic: spinal  TAP block post-closure planned  Duramorph 150 mcg    ASA 2   Induction: intravenous   Anesthetic plan and risks discussed with: patient and spouse  Anesthesia plan special considerations: antiemetics,   Post-op plan: routine recovery

## 2021-06-03 NOTE — PATIENT INSTRUCTIONS - HE
Patient Education   HEALTHY PREGNANCY CARE: 37 to 41 WEEKS PREGNANT    Talk with your midwife or physician about when to call with signs of labor    Regular uterine contractions that are getting closer together and/or stronger    If you think your water has broken or is leaking    Bleeding from the vagina like a period (bloody vaginal discharge is normal)    If you are not feeling your baby move    Make plans for transportation and  as needed for when you are going to the hospital.    Your midwife or physician may offer to check your cervix for changes.     Ask your health care provider about vaccinations you may need following delivery. By now, you should have received a Tdap immunization to protect against pertussis or whooping cough. Fathers and family members who will be in close contact with the baby should also receive a Tdap shot at least two weeks before the expected birth of the baby if they have not had a Td (tetanus) shot for at least two years.    If you are past your due date, discuss the next steps leading to delivery with your midwife or physician. If you don't start labor on your own by 41 or 42 weeks, your midwife or physician may recommend giving you medicines to ripen your cervix and start labor.    Preparing for your baby: Tell your midwife or physician how you plan to feed your baby (breast or bottle), who you have chosen to do pediatric care for your baby, and if you have a boy, whether you have chosen to have him circumcised. You will need a car seat correctly installed in your vehicle to bring your baby home. As you start to set up the nursery at home for your baby, make sure the crib is safe. The mattress needs to fit snugly against the edges of the crib. If you can fit a soda can between the bars, they are too far apart and can allow the baby's head to caught between them.    Learn about infant care and feeding, including information about infant CPR. We recommend that you put  your baby to sleep on his or her back to reduce the chance of Sudden Infant Death Syndrome (SIDS). To maintain a healthy environment in which your child can grow, it's best to keep your home smoke-free. By preparing ahead, your transition into parenthood will go smoothly for you and your baby.    Your midwife or physician will want to see you for a checkup 2 to 6 weeks after delivery.    If you have questions about any symptoms you are experiencing or any other concerns, call your provider or their clinic staff at [facility] FAMILY MEDICINE/OB at Phone: [phone]. If it's after clinic hours, physician patients should call the Care Connection at [phone] ([phone]); midwife patients should call their answering service at [phone].    How can you care for yourself at home?   You can refer to the Starting Out Right book or find it online at [email or web address].[email or web address]/images/stories/[email or web address]-[email or web address]-Right.pdf or http://www.[facility].org/images/stories/flipbooks/[facility]-starting-out-right/[facility]-starting-out-right.html#p=8     You can sign up for a weekly parenting e-mail that gives support, tips and advice from health care professionals that starts with pregnancy and continues through the toddler years. To register, go to [email or web address] at any time during your pregnancy.    Making Early Breastfeeding or Chestfeeding Work: What's Important?  Breastfeeding/chestfeeding is important!     It helps keep babies healthy.    Parents who breastfeed/chestfeed have lower risks of breast and ovarian cancer.    It's convenient: the milk is always ready and warm, and there is nothing to mix or prepare for feeding.    Formula is harder for your baby to digest.    It helps you bond with your baby and protects against postpartum depression.  Lots of early skin-to-skin contact with your baby    Place your naked baby with baby's belly against your bare chest. Cover baby's back with a  "blanket    Start skin-to-skin right after birth, as soon as you are ready    Skin-to-skin:  ? Helps keep baby warm  ? Improves baby's oxygen and blood sugar levels  ? Helps your uterus contract and bleed less  ? Helps baby feel calm and comforted  ? Helps you feel close to baby  ? Helps get breastfeeding started. Being close makes latching on easier, and baby may move over to the nipple and latch without help  ? Baby breastfeeds better and longer when skin-to-skin  Placing baby well for good attachment to the breast or chest    Hold your baby close with baby's tummy touching your tummy.    Wait for baby to open mouth wide, then bring baby onto the breast/chest.    Baby should take a big mouthful of breast/chest, not just the nipple. This helps baby get more milk, and the suckling should feel comfortable.    When baby is latched well to the breast/chest, nipples aren't cracked or painful.  Keeping baby near (called \"rooming-in\" at the hospital)    Baby sleeps better and cries less when birth parent is near. Your room is quiet.    We will place your baby safely on their back in their bassinette. This lets you practice safe sleep for your baby while keeping them at your bedside.    Baby feeds more often, which means your milk supply increases faster, and your baby loses less weight.    Parents have an easier time getting to know and bonding with baby.    Parents feel much more confident about baby care and breastfeeding/chestfeeding.    Maternity staff can help at any time.  Feeding on cue    Feeding on cue simply means feeding whenever your baby shows signs of hunger    Crying is a late hunger sign. Feed baby whenever baby wants for as long as baby wants.    Feeding signs: mouth movements, sticking the tongue out, rooting (baby turns toward chest and may open mouth), hand-to-mouth movements    Advantages of feeding on cue:  ? Frequent breastfeeding/chestfeeding in the first weeks after birth gives you a good milk " "supply for months to come.  ? Babies settle into a relaxing feed more quickly. Babies enjoy feedings more when they don't have to cry to be fed.  ? Feeding is comfort as well as nutrition. Newborns love constant closeness and feeding and can't be held \"too much\" or \"spoiled.\"  ? Newborns need small frequent feedings in the first days of life. Just one to three teaspoons fill a new baby's stomach.  ? Responding to feeding cues helps babies gain weight.  Feeding only human milk in the first six months    Colostrum is the first milk that baby gets at birth. The amount of colostrum matches the baby's stomach, so it will not be overfilled.    The small volumes ready at birth are also easier for baby to handle.    All babies lose weight in the first few days. This is simply \"water weight.\"    Introducing food or fluids other than human milk too early can cause problems for breastfeeding/chestfeeding and for your baby's health.    Feeding only human milk maximizes the protection against disease and infections.    Your body knows how much milk to make by how often your baby feeds. If you give your baby formula, your body may not know how much milk to make.    For informational purposes only. Not to replace the advice of your health care provider. Adapted with permission from \"Getting Started with Infant Care and Breastfeeding,\" by GERSON Hernandez, MARINA, Maria D Portillo, RN, IBCLC, Sherry Hancock MD, MARINA, and Mayi Shepard MD, IBCLC. [place] [employer], 2017. Clinically reviewed by [facility]. [employer] 404223 - 05/19.        [facility]feeding Resources     Research shows that human milk offers the best  nutrition and protection for babies. So at [facility],  we care for families and babies in a way that  promotes, teaches and supports lactation. We  support all breastfeeding, chestfeeding and human  milk feeding families, as well as families who can t  breastfeed / chestfeed or who " choose not to do so.  A mother or caregiver s own milk is best for a baby,  but if you are unable to breastfeed / chestfeed, we  may suggest pasteurized donor human milk for your  baby while in the hospital.    Lactation support    The following [facility] locations offer  individual outpatient lactation consults. Some  locations offer phone or group lactation consults  with certified lactation consultants. Call to confirm  services and for information and appointments. You  may wish to call your insurance company first to see  if they will cover the cost of a consult.    [facility]: [phone]    [facility]: [phone]    [facility]: [phone]  Offers [place] First Days program, which includes  group lactation visits and one free information session  about delivering your baby at a designated [facility] hospital and the importance and benefits  of breastfeeding and human milk. These group visits  also help patients with feeding concerns and offer  information about other postpartum topics.                For informational purposes only. Not to replace the advice of your health care provider. Copyright   2005 [facility]. All rights reserved. [employer] 334381 - REV          [facility] ([place]):  [phone]    [facility] ([place]):  [phone] or [phone]    [facility]):  [phone]    [facility]  ([place]): [phone]    [facility] ([place]):  [phone] or [phone]  Includes follow-up visits for caregivers of babies  discharged from the  intensive care unit  (NICU) at [facility].    [facility]):  [phone]    [facility] ([facility],  [facility], primary care clinics):  [phone]  Also offers  weight checks, feeding discussions and support with a lactation consultant as a part of free, weekly support group.    [facility]: [phone]  Also offers a free weekly support group.                                                                                                                                                                                                      (continued)   You may also call [facility] at [phone]  for information about [facility] and [facility]  locations that offer lactation support. For information  about breastfeeding / chestfeeding and childbirth  classes in the [facility], go to [facility] at www.[email or web address]. For [facility], go to [email or web address] and click on  Classes and Events at the bottom of the page. Or, call  [phone].    Supplies    You can get breast pumps from the Birthplace nurses  at [facility] or [facility]  nurses at [facility]. Call your health  insurance to see if they will cover the cost of the  pump. Tell your nurse you d like a pump. They will  help you fill out the right paperwork. The pump will  be ready for you when you leave the hospital.    If you decide to get a pump after you leave the  hospital, you can get one from our partners at  [facility]. [facility] carries a range of  feeding supplies and pumps. Please call your health  insurance to see if they will cover the cost of the  pump. Then call [facility]  to find out what supplies and pumps are available.  Some stores may deliver the pump to your home.    [facility]:  www.[email or web address]xiduo Other lactation services    [facility]: [phone] (24 hours a day)  www.[email or web address]  Offers support for breastfeeding / chestfeeding and  human milk feeding families. Call to find a group in  your area.    National Women s Health  Information Center:  [phone]  www.womenshealth.gov/breastfeeding  Offers a breastfeeding / chestfeeding information  line in English and Vietnamese.    WIC (Women, Infants and Children) Program:  [phone]  Offers breastfeeding / chestfeeding counseling. Call  to find an office near you.    Milk banking    [facility]  [email or web address]  [phone]  Consider freezing your extra collected milk to donate  to babies in need. Email or call for information.                       If you are deaf or hard of hearing, please let us know. We provide many free services including  sign language interpreters, oral interpreters, TTYs, telephone amplifiers, note takers and written materials.

## 2021-06-03 NOTE — PROGRESS NOTES
"Please see the \"Imaging\" tab for details of today's ultrasound.    Maddy Galan MD  Specialist in Maternal-Fetal Medicine    "

## 2021-06-03 NOTE — PROGRESS NOTES
No ctx, lof, bleeding, or discharge.  No HA or vision changes.  Good FM : yes  Same complaints of back pain. Nothing new she says. Had prenatal massage for 30 min. Helped for 2 hours.   She is walking a lot-trying to keep her weight down. She has lost two pounds since last visit.     Joseph was seen today for routine prenatal visit.    Diagnoses and all orders for this visit:    Encounter for supervision of low-risk pregnancy in third trimester    Obesity    Weight is stable but infant feels like a good size on leopolds and is measuring at 36cm. She has done a great job maintaining her weight this pregnancy.   F/u in 2 weeks.     Sherry Douglas

## 2021-06-03 NOTE — ANESTHESIA PROCEDURE NOTES
Spinal Block    Patient location during procedure: OR  Start time: 12/3/2019 8:14 PM  End time: 12/3/2019 8:17 PM  Reason for block: primary anesthetic    Staffing:  Performing  Anesthesiologist: Leonides Fulotn MD    Preanesthetic Checklist  Completed: patient identified, risks, benefits, and alternatives discussed, timeout performed, consent obtained, at patient's request, airway assessed, oxygen available, suction available, emergency drugs available and hand hygiene performed  Spinal Block  Patient position: sitting  Prep: ChloraPrep and site prepped and draped  Patient monitoring: heart rate, cardiac monitor, continuous pulse ox and blood pressure  Approach: midline  Location: L3-4  Injection technique: single-shot  Needle type: pencil-tip   Needle gauge: 24 G

## 2021-06-03 NOTE — PROGRESS NOTES
No ctx, lof, bleeding, or discharge.  No HA or vision changes.  Good FM : yes  Cramps here and there  Nausea here and there  Back pain getting better.   Constipation last week - wondering if she had a hemorrhoid. Felt a bump. Looked up on Internet and said its cancer.     Exam: rectal exam reveals small resolving hemorrhoid/residual skin tag.     Joseph was seen today for routine prenatal visit and constipation.    Diagnoses and all orders for this visit:    Encounter for supervision of low-risk pregnancy in third trimester: measuring large for dates. Had to extend incision in previous c/s and will obtain growth US today to assess size of baby to prepare.   -     Group B Strep Screen by PCR  -     US OB > = 14 Weeks    Nausea and vomiting during pregnancy  -     metoclopramide (REGLAN) 5 MG tablet; Take 1 tablet (5 mg total) by mouth 3 (three) times a day.    External hemorrhoids: small, not bleeding. Declines treatment at this time. Provided reassurance.   '  Handout given  F/U in 1 week.     Sherry Douglas

## 2021-06-03 NOTE — NURSING NOTE
Patient visit today shows oligohydramnios. Scheduled for c/s 12/10. Dr Galan recommending delivery by c/s today. Sherry Douglas CNM and  [place] Charge RN notified. Patient last po intake 1100 this am. Pt instructed to remain NPO. Sent to [place] L/s today at 1300.  Socorro Tolentino

## 2021-06-03 NOTE — ANESTHESIA CARE TRANSFER NOTE
Last vitals:   Vitals:    12/03/19 2115   BP: 104/60   Pulse: 74   Resp:    Temp:    SpO2: 100%     Patient's level of consciousness is awake  Spontaneous respirations: yes  Maintains airway independently: yes  Dentition unchanged: yes  Oropharynx: oropharynx clear of all foreign objects    QCDR Measures:  ASA# 20 - Surgical Safety Checklist: WHO surgical safety checklist completed prior to induction    PQRS# 430 - Adult PONV Prevention: NA - Not adult patient, not GA or 3 or more risk factors NOT present  ASA# 8 - Peds PONV Prevention: NA - Not pediatric patient, not GA or 2 or more risk factors NOT present  PQRS# 424 - Roxana-op Temp Management: 4559F - At least one body temp DOCUMENTED => 35.5C or 95.9F within required timeframe  PQRS# 426 - PACU Transfer Protocol: - Transfer of care checklist used  ASA# 14 - Acute Post-op Pain: ASA14B - Patient did NOT experience pain >= 7 out of 10

## 2021-06-04 VITALS
DIASTOLIC BLOOD PRESSURE: 70 MMHG | HEART RATE: 80 BPM | BODY MASS INDEX: 33.18 KG/M2 | WEIGHT: 180.3 LBS | HEIGHT: 62 IN | SYSTOLIC BLOOD PRESSURE: 106 MMHG

## 2021-06-04 VITALS
HEIGHT: 62 IN | DIASTOLIC BLOOD PRESSURE: 66 MMHG | WEIGHT: 198.8 LBS | SYSTOLIC BLOOD PRESSURE: 100 MMHG | BODY MASS INDEX: 36.58 KG/M2 | HEART RATE: 96 BPM

## 2021-06-04 VITALS
BODY MASS INDEX: 37.17 KG/M2 | HEART RATE: 86 BPM | WEIGHT: 202 LBS | HEIGHT: 62 IN | SYSTOLIC BLOOD PRESSURE: 118 MMHG | DIASTOLIC BLOOD PRESSURE: 76 MMHG

## 2021-06-04 VITALS — WEIGHT: 202 LBS | HEIGHT: 62 IN | BODY MASS INDEX: 37.17 KG/M2

## 2021-06-04 NOTE — ANESTHESIA POSTPROCEDURE EVALUATION
Patient: Joseph Luevano   SECTION, REPEAT  Anesthesia type: spinal    Patient location: Labor and Delivery  Last vitals:   Vitals Value Taken Time   BP 99/66 2019  9:45 AM   Temp 36.7  C (98  F) 2019  8:30 AM   Pulse 92 2019  9:45 AM   Resp 16 2019  9:45 AM   SpO2 98 % 2019  9:45 AM     Post vital signs: stable  Level of consciousness: awake and responds to simple questions  Post-anesthesia pain: pain controlled  Post-anesthesia nausea and vomiting: no  Pulmonary: unassisted, return to baseline  Cardiovascular: stable and blood pressure at baseline  Hydration: adequate  Anesthetic events: no    QCDR Measures:  ASA# 11 - Roxana-op Cardiac Arrest: ASA11B - Patient did NOT experience unanticipated cardiac arrest  ASA# 12 - Roxana-op Mortality Rate: ASA12B - Patient did NOT die  ASA# 13 - PACU Re-Intubation Rate: ASA13B - Patient did NOT require a new airway mgmt  ASA# 10 - Composite Anes Safety: ASA10A - No serious adverse event    Additional Notes:  Patient was sitting up in bed with baby. She has been ambulating but still has a li in place. She denied headache and residual weakness.

## 2021-06-05 NOTE — PROGRESS NOTES
CC:  Here for Nexplanon insertion today   HPI:  Pt is a  24 y.o.  alert female who presents for Nexplanon insertion today. Reviewed with patient all contraceptive options including:  OCP, Ortho Evra, Nuvaring, Depo Provera, IUD-both Mirena and ParaGard, nexplanon, condoms and diaphragm.  After hearing all of her options, the patient chose Nexplanon.  We discussed risks and benefits of nexplanon  including:   the possible side effect of metrorrhagia with irregular spotting or bleeding within the first 3-6 months after insertion as well as the 20% risk of amenorrhea.  Other minor side effects were discussed including:  headache (16 percent), weight gain (12 percent), acne (12 percent), breast tenderness (10 percent), emotional lability (6 percent) and abdominal pain (5 percent), pain at insertion site.  Informed consent was obtained.  Pregnancy test was not completed. 5 wks PP and using condoms.       Patient's last menstrual period was 2019 (within weeks).  No Known Allergies    Past Medical History:   Diagnosis Date     Abnormal BPP 2018 = BPP 2/8     Abnormal glucose tolerance in pregnancy 3/20/2018    1hr *. 3hr GTT 76, 152, 108, 101.     Acute appendicitis 2019     Appendicitis 2019    Added automatically from request for surgery [id number]     Bacteriuria in pregnancy 2019     Congenital heart defect, surgically repaired 2017    Normal echocardiogram. No follow up needed, per cardiology (see echo report).     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     Oligohydramnios 2018     Oligohydramnios in third trimester, single or unspecified fetus      Post-term pregnancy 2018    41+ weeks      Retrocecal appendix           Past Surgical History:   Procedure Laterality Date     APPENDECTOMY       CARDIAC SURGERY      as a child      SECTION N/A 2018    Procedure:  SECTION;  Surgeon: Ming Potter MD;  Location: [facility];  Service:       SECTION N/A 12/3/2019    Procedure:  SECTION, REPEAT;  Surgeon: Ming Potter MD;  Location: [facility] OR;  Service: Obstetrics      SECTION, LOW TRANSVERSE       LAPAROSCOPIC CHOLECYSTECTOMY N/A 2018    Procedure: CHOLECYSTECTOMY, LAPAROSCOPIC;  Surgeon: Rafi Newsome MD;  Location: [facility];  Service:      WA ERCP W/SPHINCTEROTOMY/PAPILLOTOMY N/A 2018    Procedure: ENDOSCOPIC RETROGRADE CHOLANGIOPANCREATOGRAPHY, SPHINCTEROTOMY; PANCREATIC DUCT STENT PLACEMENT;  Surgeon: Malik Edwards MD;  Location: [facility];  Service: Gastroenterology     WA LAP,APPENDECTOMY N/A 2019    Procedure: APPENDECTOMY, LAPAROSCOPIC;  Surgeon: Devon Ellison MD;  Location: [facility];  Service: General     Social History     Socioeconomic History     Marital status: Single     Spouse name: Not on file     Number of children: Not on file     Years of education: Not on file     Highest education level: Not on file   Occupational History     Not on file   Social Needs     Financial resource strain: Not on file     Food insecurity:     Worry: Not on file     Inability: Not on file     Transportation needs:     Medical: Not on file     Non-medical: Not on file   Tobacco Use     Smoking status: Never Smoker     Smokeless tobacco: Never Used     Tobacco comment: boyfriend smokes outside   Substance and Sexual Activity     Alcohol use: No     Drug use: No     Comment: used to smoke marijuana     Sexual activity: Yes     Partners: Male     Birth control/protection: None   Lifestyle     Physical activity:     Days per week: Not on file     Minutes per session: Not on file     Stress: Not on  "file   Relationships     Social connections:     Talks on phone: Not on file     Gets together: Not on file     Attends L[employer]-day service: Not on file     Active member of club or organization: Not on file     Attends meetings of clubs or organizations: Not on file     Relationship status: Not on file     Intimate partner violence:     Fear of current or ex partner: Not on file     Emotionally abused: Not on file     Physically abused: Not on file     Forced sexual activity: Not on file   Other Topics Concern     Not on file   Social History Narrative     Not on file                        ROS:    Constitutional:  Denies Headache.  No weight changes.  No fevers or chills.    HEENT:  Denies vision changes or hearing changes. No sinus problems.  Respiratory:  No breathing issues, cough or shortness of breath  Cardiovascular:  Denies chest pain, syncope or palpitations.    GI:  Denies nausea, vomiting, diarrhea, or constipation  Musculoskeletal:  Denies joint pain, swelling, or erythema  Skin:  Denies rashes, significant lesions or pruritis.    PHYSICAL EXAM  /70 (Patient Site: Left Arm, Patient Position: Sitting, Cuff Size: Adult Regular)   Pulse 80   Ht 5' 2\" (1.575 m)   Wt 180 lb 4.8 oz (81.8 kg)   LMP 03/08/2019 (Within Weeks)   BMI 32.98 kg/m    GENERAL APPEARANCE:  The patient is a pleasant, normal appearing  female with normal affect and in no distress.    PROCEDURE:   A point approximately 9 cm from the medial epicondyle on the upper inner left arm was identified and cleaned with alcohol. This was injected with 2.5cc of 1% Lidocaine with epi along the planned insertion canal. The site was then prepped with Betadyne. The Nexplanon insertion needle was removed from the sterile pack.  The Nexplanon was then inserted just underneath the surface of the skin in the recommended fashion.  Following removal of the insertion needle, the Nexplanon implant was palpated by both the patient and myself.   The " insertion site was then covered with an adhesive bandage and then covered with a pressure bandage.  There were no complications and the patient tolerated the procedure well.      ASSESSMENT:  Nexplanon insertion    PLAN:  The paperwork was filled out and the user care was given to the patient.    The patient was advised to call if she experienced any significant pain, redness or swelling at the incision site or over the jane.    Follow up prn Nexplanon check.  Patient to leave pressure dressing in place for 24h.  Patient to leave adhesive bandage in place for 3 days.  Bleeding precautions given, patient is to call for any heavy bleeding > 1 pad per hour, severe pain or fevers.

## 2021-06-05 NOTE — PROGRESS NOTES
"Post Partum Check:  Delivery at 38w3d now .  Date of delivery: 12/3/19    Patient concerns: none    Bleeding: no concerns. Spotting and then nothing for the last week.     Pain: no concerns. Pain is much better.     LMP:Patient's last menstrual period was 2019 (within weeks).     Depression: no concerns  Postpartum Depression Screen EPDS Total Score: 14 (2020  1:57 PM)  .  Item 10 (suicidal thoughts): Negative.  Interpretation Guide: Possible Depression = 10 or greater.   Discussed at length today. She states she is feeling better. Was depressed bc in pain from c/s and  gone dealing with family issues and she had to take care of kids herself. He is back now and her pain is gone. She declines f/u, therapy or medication at this time. She will keep me updated.       Contraception Plan:  nexplanon     Immunizations needed:  HPV      Pap smear:  uptodate    Physical Exam:  Blood pressure 106/70, pulse 80, height 5' 2\" (1.575 m), weight 180 lb 4.8 oz (81.8 kg), last menstrual period 2019, unknown if currently breastfeeding. Body mass index is 32.98 kg/m .  Heart: Regular rate and rhythm, no murmurs, rubs, or gallops.  Lungs: Clear to auscultation bilaterally.  No crackles.  Abdomen: Soft, nontender, bowel sounds normal.  No significant uterine tenderness.  Genitourinary: declined.  Extremities: Nontender, no significant edema.        Assessment and Plan     24 y.o.  here today for postpartum check.  1. Discussed ideal body weight.   2. Return to work: .  3. Pap smear up to date.  4. Contraception Plan: Nexplanon. Placed today. Please see other note.   5. Postpartum Evaluation of Pregnancy/Chronic Health Conditions: none.  6. Referrals: none.  7. Next physical exam due in one year.  "

## 2021-06-13 NOTE — PROGRESS NOTES
First OB   Feeling well.   Nausea: improved with doxylamine.  Desires refill.  Appetite has also improved.  States she gets hungry every couple hours.  No pelvic pain, bleeding : stopped after phone call.   No other concerns      No ctx, lof, bleeding, or discharge.  No HA or vision changes.    Labs/imaging reviewed:   Discussed screening for aneuploidy and neural tube defects.  Will set up 1st trimester screening today.     Preformed physical exam.  Unable to assess heart tones.  Reviewed DANTE, past medical history, past surgical history, family history, genetic history, and previous obstetrical history.    Encouraged good nutrition, well balanced diet, regular activity.  History of cardiac congenital defect: Patient still not sure exactly her diagnosis was and what the surgery was that she had.  Will refer to cardiology at this time for consult.  Discussed that pregnancy can cause stress on her body and her heart.  Pap today.  GC chlamydia  Flu shot    Sherry Douglas

## 2021-06-13 NOTE — PROGRESS NOTES
Please call patient and inform:  Labs normal including pap- no signs of cancer or any suspicious findings. Will need next pap in 3 years.
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
## 2021-06-14 NOTE — PROGRESS NOTES
No ctx, lof, bleeding, or discharge.  No HA or vision changes.  Nausea and vomiting is improved.  She now has a little cold.  She is requesting a follow-up THC screen to make sure it is out of her system.    Good FM: Not yet    Plan today:   Quad screen  20 week anatomy ultrasound and level 2 fetal cardiac survey given history of congenital heart defect in mom.  THC screen  Follow-up in 4 weeks.      Sherry Douglas

## 2021-06-14 NOTE — PROGRESS NOTES
No ctx, lof, bleeding, or discharge.  No vision changes.  Endorses some intermittent headaches relieved with Tylenol.  She says she wears glasses and her night vision has been getting a little worse.    Good FM : Not feeling yet  Also endorsing some nausea and vomiting.  Ate at [employer] 2 nights ago and got really sick.  Has been puking for the last day.  Felt better last night and feeling better this morning.  Friends ate the same food and did not get sick.  But her  did eat her breakfast sandwich and then was feeling sick as well.  She does not want to take any medications for this.    Labs/imaging reviewed: Echo was completed 11/1: Normal findings per below    No previous study for comparison.    Normal left ventricular size, wall thickness and wall motion. Left ventricle ejection fraction is normal. The calculated left ventricular ejection fraction is 59%.    Normal right ventricular size and function    No obvious valvular disease.      Plan today:   Discussed quad screen to be done at next visit as well as ordering the 20 week ultrasound at next visit.  Would consider level 2 fetal cardiac survey as well given her history of congenital cardiac defect.  Reviewed echo everything is normal.  No further follow-up needed.  Still positive THC: Discussed this today.  Would wait for job applications for another month.  Follow-up in 4 weeks    Sherry Douglas

## 2021-06-14 NOTE — PROGRESS NOTES
Please call patient and inform:  Her urine drug screen was still positive for marijuana.  This can sometimes take up to 6 weeks to leave your system.

## 2021-06-15 NOTE — PROGRESS NOTES
Please call patient and inform:  Quad screen negative for neural tube defects and Down syndrome.   The risk for trisomy 18 is less than 1%.    Urine is still positive for marijuana.  One was the last time she used it ?

## 2021-06-15 NOTE — PROGRESS NOTES
No ctx, lof, bleeding, or discharge.  No HA or vision changes.  Got a job. Starts tomorrow.  She is very happy about this.  Has been living with her 's brother and his family.  Good FM : yes?  Feeling feeling something and initially those gassy but thinks it is fetal movement.  The boy!    Labs/imaging reviewed: Normal 20 week anatomy ultrasound with OB/GYN and normal fetal echo.    Plan today:  Appropriate growth.  Discussed weight gain has gained weight since her last visit initially lost 6 pounds and slowly gaining it back.  Provided letter regarding DANTE for food stamps.  Follow-up in 4 weeks.  Discussed need for 1 hour GTT at next visit.    Sherry Douglas

## 2021-06-16 ENCOUNTER — OFFICE VISIT - HEALTHEAST (OUTPATIENT)
Dept: FAMILY MEDICINE | Facility: CLINIC | Age: 26
End: 2021-06-16

## 2021-06-16 DIAGNOSIS — M62.838 MUSCLE SPASM: ICD-10-CM

## 2021-06-16 DIAGNOSIS — M25.531 RIGHT WRIST PAIN: ICD-10-CM

## 2021-06-16 DIAGNOSIS — F41.0 ANXIETY ATTACK: ICD-10-CM

## 2021-06-16 DIAGNOSIS — R07.89 ATYPICAL CHEST PAIN: ICD-10-CM

## 2021-06-16 PROBLEM — E66.9 OBESITY (BMI 35.0-39.9 WITHOUT COMORBIDITY): Status: ACTIVE | Noted: 2018-04-22

## 2021-06-16 RX ORDER — HYDROXYZINE HYDROCHLORIDE 10 MG/1
10 TABLET, FILM COATED ORAL 3 TIMES DAILY PRN
Qty: 30 TABLET | Refills: 0 | Status: SHIPPED | OUTPATIENT
Start: 2021-06-16
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
## 2021-06-16 NOTE — PROGRESS NOTES
No ctx, lof, bleeding, or discharge.  No HA or vision changes.  Good FM : yes  She reports continued tail bone and low back pain. Worse because she has to sit all day at work. Worse on the left. Radiates into the buttock. Takes about 650mg tylenol a day usually before bed. Trying to stretch and massage.    Plan today:   1 hr GTT, RPR, CBC  tdap  Maternity support belt provided  Measuring 30cm today. Still wnl, will monitor closely- 20 wk US EFW 81%. Had some weight gain since last visit!  Plans to breastfeed, reviewed benefits and expectations.   Discussed Postpartum depression. She has some hx of depression and thoughts about suicide growing up but states she would never act on it. She talks about it very nonchanlantly. We discussed she could be at a greater risk of having PPD but it seems like she has developed tools to deal with it which other woman may have not developed. We discussed watching closely for signs, having a support group and to let us know if she is not feeling like herself.     Sherry Douglas

## 2021-06-16 NOTE — PROGRESS NOTES
Please call patient and inform:  Damion Ruiz, your 1 hour glucose test was slightly elevated.  And there was sugar noted on your urine.  Did you eat a large meal right before the appointment?  I know you mentioned that buffet and I do not know if that could skew the results a little bit.    The next thing we need to do is have you do a 3 hour glucose test.  I know this sounds annoying!  But it tells us whether you have gestational diabetes.  We need to schedule you for a lab appointment on a day that is convenient for you.  You need to be fasting meaning not eating anything for the 8-12 hours beforehand.  Therefore it is best to do this first thing in the morning around 8:00.  You will need to be at the clinic for the 3 hours and get your blood drawn at every hour increments.  Please do this as soon as possible because if you do have gestational diabetes we need to get you scheduled with the diabetic educator.  Let me know if you have any questions.

## 2021-06-16 NOTE — PROGRESS NOTES
Please call patient and inform:  You passed the 3 hr glucose test with flying colors! All the levels were normal. Everything looks good. No signs of gestational diabetes.

## 2021-06-17 NOTE — PROGRESS NOTES
No ctx, lof, bleeding, or discharge.  No HA or vision changes.  Good FM: Yes  Feeling some more pelvic pressure pain.  Wondering if the baby is dropping.  Also has questions about packing her bag to get ready for baby      Plan today:   Reviewed signs and symptoms of labor and when to call.  Answered all questions patient's satisfaction.  Follow-up in 1 week.    Sherry Douglas

## 2021-06-17 NOTE — PROGRESS NOTES
No ctx, lof, bleeding, or discharge.  No HA or vision changes.  Good FM : all the time.     Labs/imaging reviewed: Passed 3 hour Glucola.    Plan today:   Reviewed pain management during labor: Currently she is thinking she would like IV meds.   PPBC: nexplanon.   11 lb weight gain.  Follow-up in 2 weeks.  Discussed need for GBS swab at next visit.      Sherry Douglas

## 2021-06-17 NOTE — PATIENT INSTRUCTIONS - HE
Patient Instructions by Shikha Mcelroy MD at 9/20/2019  1:20 PM     Author: Shikha Mcelroy MD Service: -- Author Type: Physician    Filed: 9/20/2019  2:19 PM Encounter Date: 9/20/2019 Status: Addendum    : Shikha Mcelroy MD (Physician)    Related Notes: Original Note by Shikha Mcelroy MD (Physician) filed at 9/20/2019  2:13 PM       [facility]  [address], [place]   [phone]        Patient Education   HEALTHY PREGNANCY CARE: 26-30 WEEKS PREGNANT    You are now in your last trimester of pregnancy. Your baby is growing rapidly, can open and close her eyelids, sometimes get hiccups, and you'll probably feel her moving around more often. Your baby has breathing movements and is gaining about one ounce each day. You may notice heartburn and leg cramps. Your back may ache as your body gets used to your baby's size and length.    Discuss your work situation with your midwife or physician as needed. If you stand for long periods of time, you may need to make changes and take breaks.    Pre-register online at the hospital where your baby will be born ([email or web address])     Be aware of your baby's activity level. You may be asked to do daily fetal movement counts. Contact your midwife or physician about any decreased movement.    You may have been tested for gestational diabetes today. If you are RH negative, you may have had an additional test and a Rhogam injection.    Consider receiving a Tdap vaccination to protect your baby from Pertussis/whooping cough.    If you are considering tubal ligation, discuss this with your midwife or physician. You will need to have an appointment with the obstetrician who will do the surgery to discuss the risks, benefits, and alternatives, and to sign the consent. This can be discussed at your next visit.    Continue to watch for any signs or symptoms of premature labor:  "    Regular contractions. This means having about 6 or more within 1 hour, even after you have had a glass of water and are resting.     A backache that starts and stops regularly.    An increase or change in vaginal discharge, such as heavy, mucus-like, watery, or bloody discharge.     Your water breaks or leaks.    Continue to watch for signs and symptoms of preeclampsia:     Sudden swelling of your face, hands, or feet     New vision problems such as blurring, double vision, or flashing lights    A severe headache not relieved with acetaminophen (Tylenol)    Sharp or stabbing pain in your right or middle upper abdomen    If you have any of the above symptoms or any other concerns, call your midwife or physician or their clinic staff at [facility]/OB at Phone: [phone]. If it's after clinic hours, physician patients should call the [facility] at [phone] ([phone]); midwife patients should call their answering service at [phone].    How can you care for yourself at home?   You can refer to the Starting Out Right book or find it online at [email or web address] or [email or web address]#p=8     You can sign up for a weekly parenting e-mail that gives support, tips and advice from health care professionals that starts with pregnancy and continues through the toddler years. To register, go to [email or web address] at any time during your pregnancy.    BREASTFEEDING TIPS FOR NEW MOMS     Importance of skin-to-skin contact:  ? Gets breastfeeding off to a good start  ? Keeps baby warm and is great for bonding  ? Provides calming effects and helps to stabilize breathing and  blood sugar  ? Helps the uterus to contract and bleed less    Importance of feeding whenever baby shows signs of  being hungry, \"feeding on cue\":  ? Helps " create a good milk supply appropriate to the babys needs  ? Less breastfeeding complications such as engorgement or  low supply  ? Helps baby get enough milk  ? Milk supply is determined by how often baby nurses and empties  the breast.  ? Feeding is for comfort as well as nutrition    Importance of good latch (positioning and attaching  baby properly at breast):  ? Helps prevent sore nipples  ? Helps ensure baby gets enough milk and supports moms breast  milk supply    Risks of giving baby supplements other  than moms breastmilk  Breastfeeding alone is recommended for the first 6 months, if not it:  ? Can make baby more prone to illness  ? Can make baby less satisfied at breast (wanting larger amounts or  faster flow)  ? Reduces milk supply    Importance of rooming-in:  ? Increases parent confidence while mother is supported by the  hospital staff  ? Caregivers learn how to care for baby and recognize feeding cues  ? Enables feeding whenever baby needs to eat  ? Baby is comforted with mom and learns to recognize caregivers    Avoiding use of pacifiers:  ? Use of pacifiers in the first weeks can make it difficult to make a full  milk supply  ? May interfere with baby learning to latch well      2017 [facility] [place] [address]023.  [id number]. [facility] 11.17           Breastfeeding   Why Its Important and What to Expect     Your breast milk is the best food for your baby--and  breastfeeding can help you be healthy as well.    Though breastfeeding is natural, it is a learned process for both mother and baby. To prepare, there are things you can learn--and do--before your baby is born.    ? Learn the benefits of breastfeeding.    ? Understand the basic process.    ? Know what to expect in the hospital.    ? Arrange breastfeeding support for the first few  weeks after birth.    ? Take a breastfeeding class (see back page).    ? Talk to your midwife, nurse or doctor if you have  Questions.    The benefits of breastfeeding    Human milk changes  to meet the needs of a growing baby. It is all a baby needs for the first six months of life.    In fact, babies who receive only human milk for the  first six months are less likely to develop colds, the  flu, colic, asthma, ear infections, food allergies and  diarrhea (loose, watery stools). They may be less likely      to be overweight as children, and they are less likely to develop diabetes later in life. Some studies also show that infants have a higher IQ if they are .    Breastfeeding can:    ? Help you and your baby develop a special bond--  and make you feel proud that you can feed your  Baby.    ? Reduce the total amount of blood you will lose  after delivery.    ? Help your uterus return to its non-pregnant size.    ? Reduce the risk of Sudden Infant Death Syndrome (SIDS).    ? Help you lose your pregnancy weight more quickly.    ? Help delay the return of your monthly periods.    ? Lower your risk of some breast and ovarian  cancers--as well as osteoporosis (bone loss)--later in life.    ? Save you more than $300 per month. (This  includes the cost of formula and medical bills.  Formula-fed babies get sick more often.)          If you are deaf or hard of hearing, please let us know. We provide many free services including  sign language interpreters, oral interpreters, TTYs, telephone amplifiers, note takers and written materials.        How to breastfeed    Skin-to-skin contact    Hold your baby on your chest skin-to-skin right after  birth. Skin contact calms your baby, steadies their  breathing and keeps your baby warm. Your baby will be alert and will likely want to feed within the first hour after birth.    Babies are born with reflexes that help them  breastfeed. Your body will be ready with early milk  (called colostrum), so you will have all the milk your  baby needs for that first feeding. Your nurse will help you get started.    Keep your baby with you and breastfeed whenever  your  baby is hungry. Offering the breast early and  often helps your body keep making lots of milk.    How to position your baby    There are many positions for breastfeeding.              No matter which position you choose, support your  babys back, shoulders and neck. The head and body should be in a straight line, and the entire body should face the breast. Your baby should be able to tilt the head back easily. Your baby shouldnt have to reach out to feed. Also make sure your babys nose is level with your nipple. This way, your baby will find it easier to attach to your breast.    Finally, get comfortable. Use pillows to support your  body. Dont lean over or slump to reach your baby.  Once your baby is attached to the breast, its okay to change your position slightly.    You will feel a bit of a tugging at first, but you should  never feel pain. If you do, ask your nurse or lactation expert for help. She will teach you how to latch your baby onto the breast in a way that feels more comfortable.    Breastfeeding in the hospital    For the first three days after birth, your body will  produce early milk called colostrum. This milk is  full of calories and antibodies to help keep your baby healthy. It is all your baby needs for the first few days.    Remember, babies do not eat anything while inside  you. Right after birth, your baby will only need a little bit of milk (about 1 teaspoon per feeding) to get the digestive system working well. Your baby will not need any formula--your body will make the right amount of milk.    Breastfeed whenever your baby shows signs of hunger. (See next page for a list of signs.) Crying is a late sign of hunger.    Babies often lose weight in the days after birth. This  is normal. By two weeks of age, your baby should be back to their birth weight. Your care team will watch your babys weight carefully.    If you are unable to breastfeed in the hospital, your  care team may suggest  pasteurized donor human milk for your baby.             The Most Important Points to Remember    ? Your breast milk is the perfect food for your  baby. Breastfeeding has a lot of health benefits  for you as well.    ? Hold your baby skin-to-skin as soon as possible  after birth. Do this for as long as you can. Even  if your baby doesnt go to the breast right away,  skin-to-skin contact helps your body make  more milk. This lets you get an early start on  Breastfeeding.    ? Learn how to position your baby at the breast.  This will help your baby feed well, and it will  keep you comfortable.    ? Feed your baby whenever your baby wants to  eat. You are feeding a baby, not a clock!    ? Signs that your baby is ready to eat include:  starting to wake up, chewing on fists, moving  the face from side to side, opening and closing  the mouth, sticking out the tongue and turning  toward the breast when held. Crying is a late  sign of hunger, so look for the earlier signals  that your baby makes.    ? Feed your baby only human milk for at least  six months. The World Health Organization  recommends breastfeeding for the first year,  noting it is a gregory baby who is  for  the first two years.    ? While in the hospital, plan to keep your baby  with you at all times, except for certain medical  procedures. This is an important time for you  and your baby to get to know each other and  practice breastfeeding.     Common questions    Below are questions that many women have about  breastfeeding. You will find further information in the  childbirth book your care team gave you. If you have more questions, speak with your midwife, nurse or doctor.    How do I involve my partner, family and  friends and get their help and support?    Sometimes family and friends dont understand why  you want to breastfeed. Perhaps they themselves  didnt breastfeed, or they werent  as infants. Tell them about the benefits of  breastfeeding and how important it is to feed only human milk for the first six months or so. If you feed often, you will make plenty of milk to help your baby grow, fight illness and get the best start possible.    After two to four weeks--once your baby is feeding  well and your milk supply is well established--your  partner and others can feed the baby your milk from  a cup, dropper or bottle. You can remove milk from  your breast (by hand or pump) and store it for later  use. This way, your baby will have your milk even  when youre away.    Remind everyone that there are lots of ways to help  that dont involve feeding: making meals, caring for  your other children, comforting the baby if they cries, changing diapers, running errands and more.    Is breastfeeding painful?    Not usually. There are ways to prevent pain and to  treat it if it happens.    The best ways to avoid pain are to feed your baby  often, use a good position and correctly latch your  baby onto the breast. These help prevent the two  most common sources of pain: sore nipples and  engorgement (overly full breasts). You will learn more about these topics in a breastfeeding class and in the hospital after your baby is born.         How much time does it take to breastfeed?    Some new mothers feel that all they do is breastfeed. In the early weeks, a baby eats 8 to 12 times per day. Sometimes babies will cluster feedings close together. At other times, there are longer stretches between feedings.    Remember, your newborns stomach will be about  the size of a walnut. Your baby wont eat much at each feeding. If you feed your baby often in the first days, your baby--and your milk supply--will grow. Your baby will eat more at each session, and you will need to nurse less often. Within a few weeks, most women find that breastfeeding is easier and takes less time than formula feeding.    How will I know if my baby is getting  enough milk?    Your body  will start making milk as soon as your  baby is born. The more often you put your baby to  breast, the more milk you will make. You should avoid pacifiers for the first several weeks until breastfeeding is well established--you want your baby to do all their sucking at the breast. This will help you make more milk.    There are signs that your baby is getting enough milk. You will learn these signs over time. For example:    ? You will count wet and soiled diapers, because  these show how much milk your baby is getting.    ? Your baby will seem satisfied after feedings.    ? Your baby will grow and gain weight after the first  few days.    Are there reasons why a woman shouldnt  breastfeed her baby?    There are a few medical concerns that prevent  breastfeeding. In mothers, these include being HIVpositive, having active or untreated TB (tuberculosis)  and using street drugs or some medicines. These  mothers usually choose infant formula for their  Babies.    A few women choose not to breastfeed for personal  reasons. But most mothers can breastfeed. If you are not sure if its okay to breastfeed, ask your care team.    Getting support    Before your baby is born, get as much information  as you can. Sign up for a breastfeeding class. Most  childbirth classes discuss breastfeeding, but a special class will give more in-depth information.    ? In the [facility]: Go to [facility].    ? In [facility]: Go to [email or web address].  Click on Classes-and Events at the bottom of the  page, then search for breastfeeding. Or, call [phone].    Remember, help is available from your hospital, clinic, lactation experts or online. If you need help after leaving the hospital:    ? Call your babys clinic. (If you dont have a clinic,  call [phone] and ask for a referral.)    ? Call a lactation consultant. (If you need help  finding a location that offers lactation support,  call  [phone].)    ? Call [employer] (24 hours a  day) at 4-235-NI-LECHE [[phone]].    ? Call the Women, Infants and Children (WIC)  program at 1-429.454.9610.    ? Call the National Womens Health Information  Center (English and [place]se) at 1-234.732.7454 or  go to www.womenshealth.gov/breastfeeding.    ? Go to [employer].     For informational purposes only. Not to replace the advice of your health care provider. Copyright   2010 [facility]. All rights reserved. Clinically reviewed by [facility]. [employer] [id number] - REV 06/18.       Patient Education   HEALTHY PREGNANCY CARE: 26-30 WEEKS PREGNANT    You are now in your last trimester of pregnancy. Your baby is growing rapidly, can open and close her eyelids, sometimes get hiccups, and you'll probably feel her moving around more often. Your baby has breathing movements and is gaining about one ounce each day. You may notice heartburn and leg cramps. Your back may ache as your body gets used to your baby's size and length.    Discuss your work situation with your midwife or physician as needed. If you stand for long periods of time, you may need to make changes and take breaks.    Pre-register online at the hospital where your baby will be born ([email or web address])     Be aware of your baby's activity level. You may be asked to do daily fetal movement counts. Contact your midwife or physician about any decreased movement.    You may have been tested for gestational diabetes today. If you are RH negative, you may have had an additional test and a Rhogam injection.    Consider receiving a Tdap vaccination to protect your baby from Pertussis/whooping cough.    If you are considering tubal ligation, discuss this with your midwife or physician. You will need to have an appointment with the obstetrician who will do the surgery to discuss  the risks, benefits, and alternatives, and to sign the consent. This can be discussed at your next visit.    Continue to watch for any signs or symptoms of premature labor:     Regular contractions. This means having about 6 or more within 1 hour, even after you have had a glass of water and are resting.     A backache that starts and stops regularly.    An increase or change in vaginal discharge, such as heavy, mucus-like, watery, or bloody discharge.     Your water breaks or leaks.    Continue to watch for signs and symptoms of preeclampsia:     Sudden swelling of your face, hands, or feet     New vision problems such as blurring, double vision, or flashing lights    A severe headache not relieved with acetaminophen (Tylenol)    Sharp or stabbing pain in your right or middle upper abdomen    If you have any of the above symptoms or any other concerns, call your midwife or physician or their clinic staff at [facility]/OB at Phone: [phone]. If it's after clinic hours, physician patients should call the [facility] at [phone] ([phone]); midwife patients should call their answering service at [phone].    How can you care for yourself at home?   You can refer to the Starting Out Right book or find it online at [email or web address] or [email or web address]-starting-out-[email or web address]-starting-out-[email or web address]#p=8     You can sign up for a weekly parenting e-mail that gives support, tips and advice from health care professionals that starts with pregnancy and continues through the toddler years. To register, go to [email or web address] at any time during your pregnancy.    BREASTFEEDING TIPS FOR NEW MOMS     Importance of skin-to-skin contact:  ? Gets breastfeeding off to a good start  ? Keeps baby warm and is great for bonding  ? Provides calming effects and helps to stabilize  "breathing and  blood sugar  ? Helps the uterus to contract and bleed less    Importance of feeding whenever baby shows signs of  being hungry, \"feeding on cue\":  ? Helps create a good milk supply appropriate to the babys needs  ? Less breastfeeding complications such as engorgement or  low supply  ? Helps baby get enough milk  ? Milk supply is determined by how often baby nurses and empties  the breast.  ? Feeding is for comfort as well as nutrition    Importance of good latch (positioning and attaching  baby properly at breast):  ? Helps prevent sore nipples  ? Helps ensure baby gets enough milk and supports moms breast  milk supply    Risks of giving baby supplements other  than moms breastmilk  Breastfeeding alone is recommended for the first 6 months, if not it:  ? Can make baby more prone to illness  ? Can make baby less satisfied at breast (wanting larger amounts or  faster flow)  ? Reduces milk supply    Importance of rooming-in:  ? Increases parent confidence while mother is supported by the  hospital staff  ? Caregivers learn how to care for baby and recognize feeding cues  ? Enables feeding whenever baby needs to eat  ? Baby is comforted with mom and learns to recognize caregivers    Avoiding use of pacifiers:  ? Use of pacifiers in the first weeks can make it difficult to make a full  milk supply  ? May interfere with baby learning to latch well      2017 [facility] [place] [address].  [id number]. [facility] 11.17           Breastfeeding   Why Its Important and What to Expect     Your breast milk is the best food for your baby--and  breastfeeding can help you be healthy as well.    Though breastfeeding is natural, it is a learned process for both mother and baby. To prepare, there are things you can learn--and do--before your baby is born.    ? Learn the benefits of breastfeeding.    ? Understand the basic process.    ? Know what to expect in the hospital.    ? Arrange breastfeeding support for the first few  weeks after " birth.    ? Take a breastfeeding class (see back page).    ? Talk to your midwife, nurse or doctor if you have  Questions.    The benefits of breastfeeding    Human milk changes to meet the needs of a growing baby. It is all a baby needs for the first six months of life.    In fact, babies who receive only human milk for the  first six months are less likely to develop colds, the  flu, colic, asthma, ear infections, food allergies and  diarrhea (loose, watery stools). They may be less likely      to be overweight as children, and they are less likely to develop diabetes later in life. Some studies also show that infants have a higher IQ if they are .    Breastfeeding can:    ? Help you and your baby develop a special bond--  and make you feel proud that you can feed your  Baby.    ? Reduce the total amount of blood you will lose  after delivery.    ? Help your uterus return to its non-pregnant size.    ? Reduce the risk of Sudden Infant Death Syndrome (SIDS).    ? Help you lose your pregnancy weight more quickly.    ? Help delay the return of your monthly periods.    ? Lower your risk of some breast and ovarian  cancers--as well as osteoporosis (bone loss)--later in life.    ? Save you more than $300 per month. (This  includes the cost of formula and medical bills.  Formula-fed babies get sick more often.)          If you are deaf or hard of hearing, please let us know. We provide many free services including  sign language interpreters, oral interpreters, TTYs, telephone amplifiers, note takers and written materials.        How to breastfeed    Skin-to-skin contact    Hold your baby on your chest skin-to-skin right after  birth. Skin contact calms your baby, steadies their  breathing and keeps your baby warm. Your baby will be alert and will likely want to feed within the first hour after birth.    Babies are born with reflexes that help them  breastfeed. Your body will be ready with early milk  (called  colostrum), so you will have all the milk your  baby needs for that first feeding. Your nurse will help you get started.    Keep your baby with you and breastfeed whenever  your baby is hungry. Offering the breast early and  often helps your body keep making lots of milk.    How to position your baby    There are many positions for breastfeeding.              No matter which position you choose, support your  babys back, shoulders and neck. The head and body should be in a straight line, and the entire body should face the breast. Your baby should be able to tilt the head back easily. Your baby shouldnt have to reach out to feed. Also make sure your babys nose is level with your nipple. This way, your baby will find it easier to attach to your breast.    Finally, get comfortable. Use pillows to support your  body. Dont lean over or slump to reach your baby.  Once your baby is attached to the breast, its okay to change your position slightly.    You will feel a bit of a tugging at first, but you should  never feel pain. If you do, ask your nurse or lactation expert for help. She will teach you how to latch your baby onto the breast in a way that feels more comfortable.    Breastfeeding in the hospital    For the first three days after birth, your body will  produce early milk called colostrum. This milk is  full of calories and antibodies to help keep your baby healthy. It is all your baby needs for the first few days.    Remember, babies do not eat anything while inside  you. Right after birth, your baby will only need a little bit of milk (about 1 teaspoon per feeding) to get the digestive system working well. Your baby will not need any formula--your body will make the right amount of milk.    Breastfeed whenever your baby shows signs of hunger. (See next page for a list of signs.) Crying is a late sign of hunger.    Babies often lose weight in the days after birth. This  is normal. By two weeks of age, your baby  should be back to their birth weight. Your care team will watch your babys weight carefully.    If you are unable to breastfeed in the hospital, your  care team may suggest pasteurized donor human milk for your baby.             The Most Important Points to Remember    ? Your breast milk is the perfect food for your  baby. Breastfeeding has a lot of health benefits  for you as well.    ? Hold your baby skin-to-skin as soon as possible  after birth. Do this for as long as you can. Even  if your baby doesnt go to the breast right away,  skin-to-skin contact helps your body make  more milk. This lets you get an early start on  Breastfeeding.    ? Learn how to position your baby at the breast.  This will help your baby feed well, and it will  keep you comfortable.    ? Feed your baby whenever your baby wants to  eat. You are feeding a baby, not a clock!    ? Signs that your baby is ready to eat include:  starting to wake up, chewing on fists, moving  the face from side to side, opening and closing  the mouth, sticking out the tongue and turning  toward the breast when held. Crying is a late  sign of hunger, so look for the earlier signals  that your baby makes.    ? Feed your baby only human milk for at least  six months. The World Health Organization  recommends breastfeeding for the first year,  noting it is a gregory baby who is  for  the first two years.    ? While in the hospital, plan to keep your baby  with you at all times, except for certain medical  procedures. This is an important time for you  and your baby to get to know each other and  practice breastfeeding.     Common questions    Below are questions that many women have about  breastfeeding. You will find further information in the  childbirth book your care team gave you. If you have more questions, speak with your midwife, nurse or doctor.    How do I involve my partner, family and  friends and get their help and support?    Sometimes family  and friends dont understand why  you want to breastfeed. Perhaps they themselves  didnt breastfeed, or they werent  as infants. Tell them about the benefits of breastfeeding and how important it is to feed only human milk for the first six months or so. If you feed often, you will make plenty of milk to help your baby grow, fight illness and get the best start possible.    After two to four weeks--once your baby is feeding  well and your milk supply is well established--your  partner and others can feed the baby your milk from  a cup, dropper or bottle. You can remove milk from  your breast (by hand or pump) and store it for later  use. This way, your baby will have your milk even  when youre away.    Remind everyone that there are lots of ways to help  that dont involve feeding: making meals, caring for  your other children, comforting the baby if they cries, changing diapers, running errands and more.    Is breastfeeding painful?    Not usually. There are ways to prevent pain and to  treat it if it happens.    The best ways to avoid pain are to feed your baby  often, use a good position and correctly latch your  baby onto the breast. These help prevent the two  most common sources of pain: sore nipples and  engorgement (overly full breasts). You will learn more about these topics in a breastfeeding class and in the hospital after your baby is born.         How much time does it take to breastfeed?    Some new mothers feel that all they do is breastfeed. In the early weeks, a baby eats 8 to 12 times per day. Sometimes babies will cluster feedings close together. At other times, there are longer stretches between feedings.    Remember, your newborns stomach will be about  the size of a walnut. Your baby wont eat much at each feeding. If you feed your baby often in the first days, your baby--and your milk supply--will grow. Your baby will eat more at each session, and you will need to nurse less often.  Within a few weeks, most women find that breastfeeding is easier and takes less time than formula feeding.    How will I know if my baby is getting  enough milk?    Your body will start making milk as soon as your  baby is born. The more often you put your baby to  breast, the more milk you will make. You should avoid pacifiers for the first several weeks until breastfeeding is well established--you want your baby to do all their sucking at the breast. This will help you make more milk.    There are signs that your baby is getting enough milk. You will learn these signs over time. For example:    ? You will count wet and soiled diapers, because  these show how much milk your baby is getting.    ? Your baby will seem satisfied after feedings.    ? Your baby will grow and gain weight after the first  few days.    Are there reasons why a woman shouldnt  breastfeed her baby?    There are a few medical concerns that prevent  breastfeeding. In mothers, these include being HIVpositive, having active or untreated TB (tuberculosis)  and using street drugs or some medicines. These  mothers usually choose infant formula for their  Babies.    A few women choose not to breastfeed for personal  reasons. But most mothers can breastfeed. If you are not sure if its okay to breastfeed, ask your care team.    Getting support    Before your baby is born, get as much information  as you can. Sign up for a breastfeeding class. Most  childbirth classes discuss breastfeeding, but a special class will give more in-depth information.    ? In the [facility]: Go to [facility] at [employer].    ? In [facility]: Go to [email or web address].  Click on Classes-and Events at the bottom of the  page, then search for breastfeeding. Or, call [phone].    Remember, help is available from your hospital, clinic, lactation experts or online. If you need help after leaving the hospital:    ? Call your babys clinic. (If you  dont have a clinic,  call [phone] and ask for a referral.)    ? Call a lactation consultant. (If you need help  finding a location that offers lactation support, call  [phone].)    ? Call [employer] (24 hours a  day) at [phone] [[phone]].    ? Call the Women, Infants and Children (WIC)  program at 1-770.149.4804.    ? Call the National Womens Health Information  Center (English and Romansh) at 1-312.160.6418 or  go to www.womenshealth.gov/breastfeeding.    ? Go to [email or web address].     For informational purposes only. Not to replace the advice of your health care provider. Copyright   2010 [employer]  [facility]. All rights reserved. Clinically reviewed by [facility]. [employer] [id number] - REV 06/18.       Patient Education   HEALTHY PREGNANCY CARE: 26-30 WEEKS PREGNANT    You are now in your last trimester of pregnancy. Your baby is growing rapidly, can open and close her eyelids, sometimes get hiccups, and you'll probably feel her moving around more often. Your baby has breathing movements and is gaining about one ounce each day. You may notice heartburn and leg cramps. Your back may ache as your body gets used to your baby's size and length.    Discuss your work situation with your midwife or physician as needed. If you stand for long periods of time, you may need to make changes and take breaks.    Pre-register online at the hospital where your baby will be born ([email or web address])     Be aware of your baby's activity level. You may be asked to do daily fetal movement counts. Contact your midwife or physician about any decreased movement.    You may have been tested for gestational diabetes today. If you are RH negative, you may have had an additional test and a Rhogam injection.    Consider receiving a Tdap vaccination to protect your baby from Pertussis/whooping cough.    If you are  considering tubal ligation, discuss this with your midwife or physician. You will need to have an appointment with the obstetrician who will do the surgery to discuss the risks, benefits, and alternatives, and to sign the consent. This can be discussed at your next visit.    Continue to watch for any signs or symptoms of premature labor:     Regular contractions. This means having about 6 or more within 1 hour, even after you have had a glass of water and are resting.     A backache that starts and stops regularly.    An increase or change in vaginal discharge, such as heavy, mucus-like, watery, or bloody discharge.     Your water breaks or leaks.    Continue to watch for signs and symptoms of preeclampsia:     Sudden swelling of your face, hands, or feet     New vision problems such as blurring, double vision, or flashing lights    A severe headache not relieved with acetaminophen (Tylenol)    Sharp or stabbing pain in your right or middle upper abdomen    If you have any of the above symptoms or any other concerns, call your midwife or physician or their clinic staff at [facility] at Phone: [phone]. If it's after clinic hours, physician patients should call the [facility] at [phone] ([phone]); midwife patients should call their answering service at [phone].    How can you care for yourself at home?   You can refer to the Starting Out Right book or find it online at [email or web address] or [email or web address]     You can sign up for a weekly parenting e-mail that gives support, tips and advice from health care professionals that starts with pregnancy and continues through the toddler years. To register, go to [email or web address] at any time during your pregnancy.    BREASTFEEDING TIPS FOR NEW MOMS  "    Importance of skin-to-skin contact:  ? Gets breastfeeding off to a good start  ? Keeps baby warm and is great for bonding  ? Provides calming effects and helps to stabilize breathing and  blood sugar  ? Helps the uterus to contract and bleed less    Importance of feeding whenever baby shows signs of  being hungry, \"feeding on cue\":  ? Helps create a good milk supply appropriate to the babys needs  ? Less breastfeeding complications such as engorgement or  low supply  ? Helps baby get enough milk  ? Milk supply is determined by how often baby nurses and empties  the breast.  ? Feeding is for comfort as well as nutrition    Importance of good latch (positioning and attaching  baby properly at breast):  ? Helps prevent sore nipples  ? Helps ensure baby gets enough milk and supports moms breast  milk supply    Risks of giving baby supplements other  than moms breastmilk  Breastfeeding alone is recommended for the first 6 months, if not it:  ? Can make baby more prone to illness  ? Can make baby less satisfied at breast (wanting larger amounts or  faster flow)  ? Reduces milk supply    Importance of rooming-in:  ? Increases parent confidence while mother is supported by the  hospital staff  ? Caregivers learn how to care for baby and recognize feeding cues  ? Enables feeding whenever baby needs to eat  ? Baby is comforted with mom and learns to recognize caregivers    Avoiding use of pacifiers:  ? Use of pacifiers in the first weeks can make it difficult to make a full  milk supply  ? May interfere with baby learning to latch well      2017 [facility] [id number].  [id number]. [facility] 11.17           Breastfeeding   Why Its Important and What to Expect     Your breast milk is the best food for your baby--and  breastfeeding can help you be healthy as well.    Though breastfeeding is natural, it is a learned process for both mother and baby. To prepare, there are things you can learn--and do--before your baby is born.    ? Learn the " benefits of breastfeeding.    ? Understand the basic process.    ? Know what to expect in the hospital.    ? Arrange breastfeeding support for the first few  weeks after birth.    ? Take a breastfeeding class (see back page).    ? Talk to your midwife, nurse or doctor if you have  Questions.    The benefits of breastfeeding    Human milk changes to meet the needs of a growing baby. It is all a baby needs for the first six months of life.    In fact, babies who receive only human milk for the  first six months are less likely to develop colds, the  flu, colic, asthma, ear infections, food allergies and  diarrhea (loose, watery stools). They may be less likely      to be overweight as children, and they are less likely to develop diabetes later in life. Some studies also show that infants have a higher IQ if they are .    Breastfeeding can:    ? Help you and your baby develop a special bond--  and make you feel proud that you can feed your  Baby.    ? Reduce the total amount of blood you will lose  after delivery.    ? Help your uterus return to its non-pregnant size.    ? Reduce the risk of Sudden Infant Death Syndrome (SIDS).    ? Help you lose your pregnancy weight more quickly.    ? Help delay the return of your monthly periods.    ? Lower your risk of some breast and ovarian  cancers--as well as osteoporosis (bone loss)--later in life.    ? Save you more than $300 per month. (This  includes the cost of formula and medical bills.  Formula-fed babies get sick more often.)          If you are deaf or hard of hearing, please let us know. We provide many free services including  sign language interpreters, oral interpreters, TTYs, telephone amplifiers, note takers and written materials.        How to breastfeed    Skin-to-skin contact    Hold your baby on your chest skin-to-skin right after  birth. Skin contact calms your baby, steadies their  breathing and keeps your baby warm. Your baby will be alert and  will likely want to feed within the first hour after birth.    Babies are born with reflexes that help them  breastfeed. Your body will be ready with early milk  (called colostrum), so you will have all the milk your  baby needs for that first feeding. Your nurse will help you get started.    Keep your baby with you and breastfeed whenever  your baby is hungry. Offering the breast early and  often helps your body keep making lots of milk.    How to position your baby    There are many positions for breastfeeding.              No matter which position you choose, support your  babys back, shoulders and neck. The head and body should be in a straight line, and the entire body should face the breast. Your baby should be able to tilt the head back easily. Your baby shouldnt have to reach out to feed. Also make sure your babys nose is level with your nipple. This way, your baby will find it easier to attach to your breast.    Finally, get comfortable. Use pillows to support your  body. Dont lean over or slump to reach your baby.  Once your baby is attached to the breast, its okay to change your position slightly.    You will feel a bit of a tugging at first, but you should  never feel pain. If you do, ask your nurse or lactation expert for help. She will teach you how to latch your baby onto the breast in a way that feels more comfortable.    Breastfeeding in the hospital    For the first three days after birth, your body will  produce early milk called colostrum. This milk is  full of calories and antibodies to help keep your baby healthy. It is all your baby needs for the first few days.    Remember, babies do not eat anything while inside  you. Right after birth, your baby will only need a little bit of milk (about 1 teaspoon per feeding) to get the digestive system working well. Your baby will not need any formula--your body will make the right amount of milk.    Breastfeed whenever your baby shows signs of hunger.  (See next page for a list of signs.) Crying is a late sign of hunger.    Babies often lose weight in the days after birth. This  is normal. By two weeks of age, your baby should be back to their birth weight. Your care team will watch your babys weight carefully.    If you are unable to breastfeed in the hospital, your  care team may suggest pasteurized donor human milk for your baby.             The Most Important Points to Remember    ? Your breast milk is the perfect food for your  baby. Breastfeeding has a lot of health benefits  for you as well.    ? Hold your baby skin-to-skin as soon as possible  after birth. Do this for as long as you can. Even  if your baby doesnt go to the breast right away,  skin-to-skin contact helps your body make  more milk. This lets you get an early start on  Breastfeeding.    ? Learn how to position your baby at the breast.  This will help your baby feed well, and it will  keep you comfortable.    ? Feed your baby whenever your baby wants to  eat. You are feeding a baby, not a clock!    ? Signs that your baby is ready to eat include:  starting to wake up, chewing on fists, moving  the face from side to side, opening and closing  the mouth, sticking out the tongue and turning  toward the breast when held. Crying is a late  sign of hunger, so look for the earlier signals  that your baby makes.    ? Feed your baby only human milk for at least  six months. The World Health Organization  recommends breastfeeding for the first year,  noting it is a gregory baby who is  for  the first two years.    ? While in the hospital, plan to keep your baby  with you at all times, except for certain medical  procedures. This is an important time for you  and your baby to get to know each other and  practice breastfeeding.     Common questions    Below are questions that many women have about  breastfeeding. You will find further information in the  childbirth book your care team gave you. If  you have more questions, speak with your midwife, nurse or doctor.    How do I involve my partner, family and  friends and get their help and support?    Sometimes family and friends dont understand why  you want to breastfeed. Perhaps they themselves  didnt breastfeed, or they werent  as infants. Tell them about the benefits of breastfeeding and how important it is to feed only human milk for the first six months or so. If you feed often, you will make plenty of milk to help your baby grow, fight illness and get the best start possible.    After two to four weeks--once your baby is feeding  well and your milk supply is well established--your  partner and others can feed the baby your milk from  a cup, dropper or bottle. You can remove milk from  your breast (by hand or pump) and store it for later  use. This way, your baby will have your milk even  when youre away.    Remind everyone that there are lots of ways to help  that dont involve feeding: making meals, caring for  your other children, comforting the baby if they cries, changing diapers, running errands and more.    Is breastfeeding painful?    Not usually. There are ways to prevent pain and to  treat it if it happens.    The best ways to avoid pain are to feed your baby  often, use a good position and correctly latch your  baby onto the breast. These help prevent the two  most common sources of pain: sore nipples and  engorgement (overly full breasts). You will learn more about these topics in a breastfeeding class and in the hospital after your baby is born.         How much time does it take to breastfeed?    Some new mothers feel that all they do is breastfeed. In the early weeks, a baby eats 8 to 12 times per day. Sometimes babies will cluster feedings close together. At other times, there are longer stretches between feedings.    Remember, your newborns stomach will be about  the size of a walnut. Your baby wont eat much at each feeding. If  you feed your baby often in the first days, your baby--and your milk supply--will grow. Your baby will eat more at each session, and you will need to nurse less often. Within a few weeks, most women find that breastfeeding is easier and takes less time than formula feeding.    How will I know if my baby is getting  enough milk?    Your body will start making milk as soon as your  baby is born. The more often you put your baby to  breast, the more milk you will make. You should avoid pacifiers for the first several weeks until breastfeeding is well established--you want your baby to do all their sucking at the breast. This will help you make more milk.    There are signs that your baby is getting enough milk. You will learn these signs over time. For example:    ? You will count wet and soiled diapers, because  these show how much milk your baby is getting.    ? Your baby will seem satisfied after feedings.    ? Your baby will grow and gain weight after the first  few days.    Are there reasons why a woman shouldnt  breastfeed her baby?    There are a few medical concerns that prevent  breastfeeding. In mothers, these include being HIVpositive, having active or untreated TB (tuberculosis)  and using street drugs or some medicines. These  mothers usually choose infant formula for their  Babies.    A few women choose not to breastfeed for personal  reasons. But most mothers can breastfeed. If you are not sure if its okay to breastfeed, ask your care team.    Getting support    Before your baby is born, get as much information  as you can. Sign up for a breastfeeding class. Most  childbirth classes discuss breastfeeding, but a special class will give more in-depth information.    ? In the [place]: Go to [facility]  Center at [employer].    ? In [facility]: Go to [email or web address].  Click on Classes-and Events at the bottom of the  page, then search for breastfeeding. Or, call  [phone].    Remember, help is available from your hospital, clinic, lactation experts or online. If you need help after leaving the hospital:    ? Call your babys clinic. (If you dont have a clinic,  call [phone] and ask for a referral.)    ? Call a lactation consultant. (If you need help  finding a location that offers lactation support, call  [phone].)    ? Call [employer] (24 hours a  day) at [phone]-XVIntent HQ [[phone]].    ? Call the Women, Infants and Children (WIC)  program at 1-963.540.9395.    ? Call the National Womens Health Information  Center (English and Croatian) at 1-185.336.5143 or  go to www.womenshealth.gov/breastfeeding.    ? Go to [email or web address].     For informational purposes only. Not to replace the advice of your health care provider. Copyright   2010 [employer]  [facility]. All rights reserved. Clinically reviewed by [facility]. [employer] [id number] - REV 06/18.      Patient Education   HEALTHY PREGNANCY CARE: 26-30 WEEKS PREGNANT    You are now in your last trimester of pregnancy. Your baby is growing rapidly, can open and close her eyelids, sometimes get hiccups, and you'll probably feel her moving around more often. Your baby has breathing movements and is gaining about one ounce each day. You may notice heartburn and leg cramps. Your back may ache as your body gets used to your baby's size and length.    Discuss your work situation with your midwife or physician as needed. If you stand for long periods of time, you may need to make changes and take breaks.    Pre-register online at the hospital where your baby will be born ([email or web address])     Be aware of your baby's activity level. You may be asked to do daily fetal movement counts. Contact your midwife or physician about any decreased movement.    You may have been tested for gestational diabetes today. If you  are RH negative, you may have had an additional test and a Rhogam injection.    Consider receiving a Tdap vaccination to protect your baby from Pertussis/whooping cough.    If you are considering tubal ligation, discuss this with your midwife or physician. You will need to have an appointment with the obstetrician who will do the surgery to discuss the risks, benefits, and alternatives, and to sign the consent. This can be discussed at your next visit.    Continue to watch for any signs or symptoms of premature labor:     Regular contractions. This means having about 6 or more within 1 hour, even after you have had a glass of water and are resting.     A backache that starts and stops regularly.    An increase or change in vaginal discharge, such as heavy, mucus-like, watery, or bloody discharge.     Your water breaks or leaks.    Continue to watch for signs and symptoms of preeclampsia:     Sudden swelling of your face, hands, or feet     New vision problems such as blurring, double vision, or flashing lights    A severe headache not relieved with acetaminophen (Tylenol)    Sharp or stabbing pain in your right or middle upper abdomen    If you have any of the above symptoms or any other concerns, call your midwife or physician or their clinic staff at [facility] at Phone: [phone]. If it's after clinic hours, physician patients should call the [facility] at [phone] ([phone]); midwife patients should call their answering service at [phone].    How can you care for yourself at home?   You can refer to the Starting Out Right book or find it online at [email or web address] or [email or web address]     You can sign up for a weekly parenting e-mail that gives support, tips and advice from health care professionals that  "starts with pregnancy and continues through the toddler years. To register, go to [email or web address] at any time during your pregnancy.    BREASTFEEDING TIPS FOR NEW MOMS     Importance of skin-to-skin contact:  ? Gets breastfeeding off to a good start  ? Keeps baby warm and is great for bonding  ? Provides calming effects and helps to stabilize breathing and  blood sugar  ? Helps the uterus to contract and bleed less    Importance of feeding whenever baby shows signs of  being hungry, \"feeding on cue\":  ? Helps create a good milk supply appropriate to the babys needs  ? Less breastfeeding complications such as engorgement or  low supply  ? Helps baby get enough milk  ? Milk supply is determined by how often baby nurses and empties  the breast.  ? Feeding is for comfort as well as nutrition    Importance of good latch (positioning and attaching  baby properly at breast):  ? Helps prevent sore nipples  ? Helps ensure baby gets enough milk and supports moms breast  milk supply    Risks of giving baby supplements other  than moms breastmilk  Breastfeeding alone is recommended for the first 6 months, if not it:  ? Can make baby more prone to illness  ? Can make baby less satisfied at breast (wanting larger amounts or  faster flow)  ? Reduces milk supply    Importance of rooming-in:  ? Increases parent confidence while mother is supported by the  hospital staff  ? Caregivers learn how to care for baby and recognize feeding cues  ? Enables feeding whenever baby needs to eat  ? Baby is comforted with mom and learns to recognize caregivers    Avoiding use of pacifiers:  ? Use of pacifiers in the first weeks can make it difficult to make a full  milk supply  ? May interfere with baby learning to latch well      2017 [facility] [place] [id number].  [id number]. [facility] 11.17           Breastfeeding   Why Its Important and What to Expect     Your breast milk is the best food for your baby--and  breastfeeding can help you be healthy as " well.    Though breastfeeding is natural, it is a learned process for both mother and baby. To prepare, there are things you can learn--and do--before your baby is born.    ? Learn the benefits of breastfeeding.    ? Understand the basic process.    ? Know what to expect in the hospital.    ? Arrange breastfeeding support for the first few  weeks after birth.    ? Take a breastfeeding class (see back page).    ? Talk to your midwife, nurse or doctor if you have  Questions.    The benefits of breastfeeding    Human milk changes to meet the needs of a growing baby. It is all a baby needs for the first six months of life.    In fact, babies who receive only human milk for the  first six months are less likely to develop colds, the  flu, colic, asthma, ear infections, food allergies and  diarrhea (loose, watery stools). They may be less likely      to be overweight as children, and they are less likely to develop diabetes later in life. Some studies also show that infants have a higher IQ if they are .    Breastfeeding can:    ? Help you and your baby develop a special bond--  and make you feel proud that you can feed your  Baby.    ? Reduce the total amount of blood you will lose  after delivery.    ? Help your uterus return to its non-pregnant size.    ? Reduce the risk of Sudden Infant Death Syndrome (SIDS).    ? Help you lose your pregnancy weight more quickly.    ? Help delay the return of your monthly periods.    ? Lower your risk of some breast and ovarian  cancers--as well as osteoporosis (bone loss)--later in life.    ? Save you more than $300 per month. (This  includes the cost of formula and medical bills.  Formula-fed babies get sick more often.)          If you are deaf or hard of hearing, please let us know. We provide many free services including  sign language interpreters, oral interpreters, TTYs, telephone amplifiers, note takers and written materials.        How to breastfeed    Skin-to-skin  contact    Hold your baby on your chest skin-to-skin right after  birth. Skin contact calms your baby, steadies their  breathing and keeps your baby warm. Your baby will be alert and will likely want to feed within the first hour after birth.    Babies are born with reflexes that help them  breastfeed. Your body will be ready with early milk  (called colostrum), so you will have all the milk your  baby needs for that first feeding. Your nurse will help you get started.    Keep your baby with you and breastfeed whenever  your baby is hungry. Offering the breast early and  often helps your body keep making lots of milk.    How to position your baby    There are many positions for breastfeeding.              No matter which position you choose, support your  babys back, shoulders and neck. The head and body should be in a straight line, and the entire body should face the breast. Your baby should be able to tilt the head back easily. Your baby shouldnt have to reach out to feed. Also make sure your babys nose is level with your nipple. This way, your baby will find it easier to attach to your breast.    Finally, get comfortable. Use pillows to support your  body. Dont lean over or slump to reach your baby.  Once your baby is attached to the breast, its okay to change your position slightly.    You will feel a bit of a tugging at first, but you should  never feel pain. If you do, ask your nurse or lactation expert for help. She will teach you how to latch your baby onto the breast in a way that feels more comfortable.    Breastfeeding in the hospital    For the first three days after birth, your body will  produce early milk called colostrum. This milk is  full of calories and antibodies to help keep your baby healthy. It is all your baby needs for the first few days.    Remember, babies do not eat anything while inside  you. Right after birth, your baby will only need a little bit of milk (about 1 teaspoon per feeding)  to get the digestive system working well. Your baby will not need any formula--your body will make the right amount of milk.    Breastfeed whenever your baby shows signs of hunger. (See next page for a list of signs.) Crying is a late sign of hunger.    Babies often lose weight in the days after birth. This  is normal. By two weeks of age, your baby should be back to their birth weight. Your care team will watch your babys weight carefully.    If you are unable to breastfeed in the hospital, your  care team may suggest pasteurized donor human milk for your baby.             The Most Important Points to Remember    ? Your breast milk is the perfect food for your  baby. Breastfeeding has a lot of health benefits  for you as well.    ? Hold your baby skin-to-skin as soon as possible  after birth. Do this for as long as you can. Even  if your baby doesnt go to the breast right away,  skin-to-skin contact helps your body make  more milk. This lets you get an early start on  Breastfeeding.    ? Learn how to position your baby at the breast.  This will help your baby feed well, and it will  keep you comfortable.    ? Feed your baby whenever your baby wants to  eat. You are feeding a baby, not a clock!    ? Signs that your baby is ready to eat include:  starting to wake up, chewing on fists, moving  the face from side to side, opening and closing  the mouth, sticking out the tongue and turning  toward the breast when held. Crying is a late  sign of hunger, so look for the earlier signals  that your baby makes.    ? Feed your baby only human milk for at least  six months. The World Health Organization  recommends breastfeeding for the first year,  noting it is a gregory baby who is  for  the first two years.    ? While in the hospital, plan to keep your baby  with you at all times, except for certain medical  procedures. This is an important time for you  and your baby to get to know each other and  practice  breastfeeding.     Common questions    Below are questions that many women have about  breastfeeding. You will find further information in the  childbirth book your care team gave you. If you have more questions, speak with your midwife, nurse or doctor.    How do I involve my partner, family and  friends and get their help and support?    Sometimes family and friends dont understand why  you want to breastfeed. Perhaps they themselves  didnt breastfeed, or they werent  as infants. Tell them about the benefits of breastfeeding and how important it is to feed only human milk for the first six months or so. If you feed often, you will make plenty of milk to help your baby grow, fight illness and get the best start possible.    After two to four weeks--once your baby is feeding  well and your milk supply is well established--your  partner and others can feed the baby your milk from  a cup, dropper or bottle. You can remove milk from  your breast (by hand or pump) and store it for later  use. This way, your baby will have your milk even  when youre away.    Remind everyone that there are lots of ways to help  that dont involve feeding: making meals, caring for  your other children, comforting the baby if they cries, changing diapers, running errands and more.    Is breastfeeding painful?    Not usually. There are ways to prevent pain and to  treat it if it happens.    The best ways to avoid pain are to feed your baby  often, use a good position and correctly latch your  baby onto the breast. These help prevent the two  most common sources of pain: sore nipples and  engorgement (overly full breasts). You will learn more about these topics in a breastfeeding class and in the hospital after your baby is born.         How much time does it take to breastfeed?    Some new mothers feel that all they do is breastfeed. In the early weeks, a baby eats 8 to 12 times per day. Sometimes babies will cluster feedings close  together. At other times, there are longer stretches between feedings.    Remember, your newborns stomach will be about  the size of a walnut. Your baby wont eat much at each feeding. If you feed your baby often in the first days, your baby--and your milk supply--will grow. Your baby will eat more at each session, and you will need to nurse less often. Within a few weeks, most women find that breastfeeding is easier and takes less time than formula feeding.    How will I know if my baby is getting  enough milk?    Your body will start making milk as soon as your  baby is born. The more often you put your baby to  breast, the more milk you will make. You should avoid pacifiers for the first several weeks until breastfeeding is well established--you want your baby to do all their sucking at the breast. This will help you make more milk.    There are signs that your baby is getting enough milk. You will learn these signs over time. For example:    ? You will count wet and soiled diapers, because  these show how much milk your baby is getting.    ? Your baby will seem satisfied after feedings.    ? Your baby will grow and gain weight after the first  few days.    Are there reasons why a woman shouldnt  breastfeed her baby?    There are a few medical concerns that prevent  breastfeeding. In mothers, these include being HIVpositive, having active or untreated TB (tuberculosis)  and using street drugs or some medicines. These  mothers usually choose infant formula for their  Babies.    A few women choose not to breastfeed for personal  reasons. But most mothers can breastfeed. If you are not sure if its okay to breastfeed, ask your care team.    Getting support    Before your baby is born, get as much information  as you can. Sign up for a breastfeeding class. Most  childbirth classes discuss breastfeeding, but a special class will give more in-depth information.    ? In the [facility]: Go to [facility]  Center at  [employer].    ? In [facility]: Go to www.[employer].  Click on Classes-and Events at the bottom of the  page, then search for breastfeeding. Or, call [phone].    Remember, help is available from your hospital, clinic, lactation experts or online. If you need help after leaving the hospital:    ? Call your babys clinic. (If you dont have a clinic,  call [phone] and ask for a referral.)    ? Call a lactation consultant. (If you need help  finding a location that offers lactation support, call  [phone].)    ? Call [employer] (24 hours a  day) at [phone] [[phone]].    ? Call the Women, Infants and Children (WIC)  program at 1-523.616.8155.    ? Call the [employer] [facility] Information  Center (English and Chinese) at 1-943.504.2102 or  go to www.womenshealth.gov/breastfeeding.    ? Go to [employer].     For informational purposes only. Not to replace the advice of your health care provider. Copyright   2010 [place] [employer]  [facility]. All rights reserved. Clinically reviewed by [facility]. [employer] 505113 - REV 06/18.    Patient Education     Kick Counts    Its normal to worry about your babys health. One way you can know your babys doing well is to record the babys movements once a day. This is called a kick count. Remember to take your kick count records to all your appointments with your healthcare provider.  How to count kicks  Time how long it takes you to feel 10 kicks, flutters, swishes, or rolls. Ideally, you want to feel at least 10 movements within 2 hours. You will likely feel 10 movements in less time than that.  Starting at 28 weeks, count your baby's movements daily. Follow your healthcare provider's instructions for kick counting. Here are tips for counting kicks:    Choose a time when the baby is active, such as after a meal.     Sit comfortably or lie on your side.     The first time the baby  moves, write down the time.     Count each movement until the baby has moved 10 times. This can take from 20 minutes to 2 hours.     If you have not felt 10 kicks by the end of the second hour, wait a few hours. Then try again.    Try to do it at the same time each day.  When to call your healthcare provider  Call your healthcare provider right away if:    You do a couple sets of kick counts during the day and your baby moves fewer than 10 times in 2 hours    Your baby moves much less often than on the days before.    You have not felt your baby move all day.  Date Last Reviewed: 12/1/2017 2000-2019 [employer]. [address] [place] [address], [place] [address]. All rights reserved. This information is not intended as a substitute for professional medical care. Always follow your healthcare professional's instructions.

## 2021-06-17 NOTE — PROGRESS NOTES
No ctx, lof, bleeding, or discharge.  No HA or vision changes.  Good FM : yes  Intermittent pelvic cramping.    Labs/imaging reviewed: Growth ultrasound showing EFW in the 60th percentile.  Vertex position.  GBS negative    Plan today:   Discussed signs and symptoms of labor.  Discussed appropriate management if goes postdates.  Doing well.  Follow-up in 1 week.    Sherry Douglas

## 2021-06-17 NOTE — PROGRESS NOTES
No ctx, lof, bleeding, or discharge.  No HA or vision changes.  Good FM : yes    Plan today:   She is measuring on the larger end for dates.  39 cm at 36 weeks.  The initial growth ultrasound at 20 weeks was measuring EFW 81%.  Her BMI is currently 37.  In addition her cervix is closed and I think I feel a fetal head however I was hearing the fetal heart tones by the umbilicus.  Therefor will obtain growth ultrasound and confirm the fetal position.  GBS swab.  Follow-up in 1 week.    Sherry Douglas

## 2021-06-17 NOTE — PROGRESS NOTES
Please call patient and inform:  Your growth ultrasound looks good.  Baby is in the 60th percentile for weight.  Baby is head down.

## 2021-06-18 NOTE — ANESTHESIA CARE TRANSFER NOTE
Last vitals:   Vitals:    05/26/18 1042   BP: 132/67   Pulse: (!) 123   Resp: 12   Temp: 37  C (98.6  F)   SpO2: 99%     Patient's level of consciousness is drowsy  Spontaneous respirations: yes  Maintains airway independently: yes  Dentition unchanged: yes  Oropharynx: oropharynx clear of all foreign objects    QCDR Measures:  ASA# 20 - Surgical Safety Checklist: WHO surgical safety checklist completed prior to induction  PQRS# 430 - Adult PONV Prevention: 4558F - Pt received => 2 anti-emetic agents (different classes) preop & intraop  ASA# 8 - Peds PONV Prevention: NA - Not pediatric patient, not GA or 2 or more risk factors NOT present  PQRS# 424 - [place]ana-op Temp Management: 4559F-1P - Emergency case or intentional hypothermia  PQRS# 426 - PACU Transfer Protocol: - Transfer of care checklist used  ASA# 14 - Acute Post-op Pain: ASA14B - Patient did NOT experience pain >= 7 out of 10

## 2021-06-18 NOTE — ANESTHESIA PROCEDURE NOTES
Peripheral Block    Patient location during procedure: OR  Start time: 5/26/2018 10:16 AM  End time: 5/26/2018 10:26 AM  post-op analgesia per surgeon order as noted in medical record  Staffing:  Performing  Anesthesiologist: CHYNA GARRISON  Preanesthetic Checklist  Completed: patient identified, site marked, risks, benefits, and alternatives discussed, timeout performed, consent obtained, airway assessed, oxygen available, suction available, emergency drugs available and hand hygiene performed  Peripheral Block  Block type: other, TAP  Prep: ChloraPrep  Patient position: supine  Patient monitoring: cardiac monitor, continuous pulse oximetry, heart rate and blood pressure  Laterality: bilateral, same technique used bilaterally  Injection technique: ultrasound guided            Needle  Needle type: Stimuplex   Needle gauge: 21 G  Needle length: 4 in  no peripheral nerve catheter placed  Assessment  Injection assessment: no difficulty with injection, negative aspiration for heme, no paresthesia on injection and incremental injection

## 2021-06-18 NOTE — PROGRESS NOTES
No ctx, lof, bleeding, or discharge.  No HA or vision changes.  Good FM : yes  Cramps.   Thinks she had food poisoning.   Vomiting, HA, diarrhea at the same time. 2 days ago.   wednesday and thrusday. Feels better today-no fevers  Was able to eat well today.    Plan today:  Well-appearing on exam.  Some weight gain.  Measuring well.  SVE closed/70/-2/medium/med.  Cervix has definitely thinned out and baby feels lower.  Follow-up in 1 week.    Sherry Douglas

## 2021-06-18 NOTE — PROGRESS NOTES
No ctx or bleeding.  No HA or vision changes.  Good FM : yes  Cramping; more back pain.  Reports some discharge.  Sometimes creamy white and sometimes clear.  Sometimes just some wetness on her underwear.  Thought she was peeing on herself.  She is reporting may be some clear fluid.  Not sure what it is.  Not the mucous plug.  Thought it was urine but states is more clear than that.  No gush of fluid.    Plan today:   Sterile speculum exam: No pooling.  Significant amount of creamy white as well as clear discharge.  Fern test was negative.  Provided reassurance that I think it just normal physiologic discharge of pregnancy.  But if she feels like she is still having some leakage of clear fluid reviewed this could be a small leak in her bag of water and she should let us know or come in for evaluation and do a special test to evaluate.  She states understanding.  SVE: Cervix is almost open but the internal os is still closed.  the outer cervix is more dilated than previously.  Otherwise unchanged.  Reviewed signs and symptoms of labor when to call or go in to the hospital.   was present and we reviewed induction for postdates.  Will have her follow-up next Wednesday at 40 and 6 days and plan for induction versus watchful waiting and  testing.      Sherry Douglas

## 2021-06-18 NOTE — PROGRESS NOTES
No ctx, lof, bleeding.  Continues with some whitish creamy discharge but no clear fluids.  Intermittent cramping still.  No HA or vision changes.  Good FM :yes  Has been walking a lot trying to stimulate contractions.    Plan today:  Reviewed THC history.  Has not used since beginning of pregnancy but did remain in her system for a while.  Discussed urine and meconium drug test on baby.  Discussed this is our protocol and what to expect.  Reviewed late term/post dates and plans for induction.  SVE today is the same/unchanged.  I still do not think I am getting through the inner os.  She states her mother does not want her to be induced.  We reviewed again the risks of going past 42 weeks.  She states that her and her  would like to induce by 42 weeks.  However would like to wait until next week to discuss possible induction.  Discussed need for  testing starting at 41 weeks twice weekly if she wants to wait.  We set up BPP/NST for tomorrow Thursday at 41 weeks and on .  She will return to clinic next Tuesday and will discuss plans for induction at the time if she is still pregnant.  Follow-up Tuesday.    Sherry Douglas

## 2021-06-18 NOTE — ANESTHESIA PROCEDURE NOTES
Epidural Block    Patient location during procedure: OB  Time Called: 5/25/2018 4:51 PM  Reason for Block:labor epidural  Staffing:  Performing  Anesthesiologist: BETH SERNA  Preanesthetic Checklist  Completed: patient identified, risks, benefits, and alternatives discussed, timeout performed, consent obtained, at patient's request, airway assessed, oxygen available, suction available, emergency drugs available and hand hygiene performed  Procedure  Patient position: sitting  Prep: ChloraPrep and site prepped and draped  Patient monitoring: continuous pulse oximetry, heart rate and blood pressure  Approach: midline  Location: L3-L4  Injection technique: LAVONNE saline  Number of Attempts:1  Needle  Needle type: Hortencia   Needle gauge: 18 G     Catheter in Space: 5  Assessment  Sensory level:  No complications      Additional Notes:  LAVONNE @ 6 cm, taped @ 11 cm

## 2021-06-18 NOTE — ANESTHESIA PREPROCEDURE EVALUATION
Anesthesia Evaluation      Patient summary reviewed   No history of anesthetic complications     Airway   Mallampati: II  Neck ROM: full   Pulmonary - negative ROS and normal exam    breath sounds clear to auscultation                         Cardiovascular   Rhythm: regular  Rate: normal,      ROS comment: Hx congenital cardiac defect s/p surgical repair at age 4 (see echo below, which showed no evidence of valvular disease, ventricular dysfunction or ASD/VSD). Pt is unsure what the defect was.     Neuro/Psych      Comments: Hx THC use    Endo/Other    (+) obesity, pregnant     GI/Hepatic/Renal - negative ROS      Other findings: Echo 11/3/17:    No previous study for comparison.    Normal left ventricular size, wall thickness and wall motion. Left ventricle ejection fraction is normal. The calculated left ventricular ejection fraction is 59%.    Normal right ventricular size and function    No obvious valvular disease.      Dental - normal exam                        Anesthesia Plan  Planned anesthetic: epidural  Use existing epidural for csection  Epidural duramorph  Bilateral TAP blocks for POP  ASA 2     Anesthetic plan and risks discussed with: patient and spouse    Post-op plan: routine recovery

## 2021-06-18 NOTE — ANESTHESIA POSTPROCEDURE EVALUATION
Patient: Joseph Luevano   SECTION  Anesthesia type: epidural    Patient location: Labor and Delivery  Last vitals:   Vitals:    18 0800   BP: 100/60   Pulse: (!) 101   Resp: 17   Temp: 36.6  C (97.9  F)   SpO2: 97%     Post vital signs: stable  Level of consciousness: awake, alert and oriented  Post-anesthesia pain: pain controlled  Post-anesthesia nausea and vomiting: no  Pulmonary: unassisted, return to baseline  Cardiovascular: stable and blood pressure at baseline  Hydration: adequate  Anesthetic events: no    QCDR Measures:  ASA# 11 - Roxana-op Cardiac Arrest: ASA11B - Patient did NOT experience unanticipated cardiac arrest  ASA# 12 - Roxana-op Mortality Rate: ASA12B - Patient did NOT die  ASA# 13 - PACU Re-Intubation Rate: NA - No ETT / LMA used for case  ASA# 10 - Composite Anes Safety: ASA10A - No serious adverse event    Additional Notes:  Epidural worked well for C-sec.  No problems.  No f/u necessary.

## 2021-06-19 NOTE — PROGRESS NOTES
Post Partum Check:    Delivery at 41w2d now .  Date of delivery: 18    Patient concerns: none   Reflux? Having epigastric Abdominal pain. Causes her to puke- after TUMS- feels better after throwing up. Don't eat tums still puke. Pooping regular. For her. No change in bowels.   had it really bad every day after gave birth. Then got better for 3-4 weeks.   Now two more times. Last week and again this week. Sister mentioned gallstones  To her but she doesn't think that's it.    Bleeding: no concerns    Pain: no concerns    LMP:Patient's last menstrual period was 2018 (approximate).     Depression: no concerns  Postpartum Depression Screen EPDS Total Score: 10 (2018  3:00 PM).  Item 10 (suicidal thoughts): Positive.  Interpretation Guide: Possible Depression = 10 or greater.     Contraception Plan:  Would like Depo today if possible. Has been having sex- pulling out. Last unprotected sex was this morning. LMP as above.    Immunizations needed:  HPV      Pap smear:  2020    Physical Exam:  Blood pressure 98/68, pulse 86, temperature 98.1  F (36.7  C), temperature source Oral, resp. rate 20, weight 181 lb 2 oz (82.2 kg), last menstrual period 2018, not currently breastfeeding. Body mass index is 33.13 kg/(m^2).  Heart: Regular rate and rhythm, no murmurs, rubs, or gallops.  Lungs: no respiratory distress  Abdomen: Soft, epigastric tenderness, bowel sounds normal.  No significant uterine tenderness.  Genitourinary:  Vulva, vagina, and cervix are normal in appearance.  Uterus is 8 weeks in size.  No adnexal fullness.  No excessive tenderness..  Extremities: Nontender, no significant edema.        Assessment and Plan     23 y.o.  here today for postpartum check.  1. Discussed ideal body weight.   2. Return to work: work note provided.  3. Pap smear up to date.  4. Contraception Plan: Depo Provera. Discussed different options. Pregnancy test negative today. Discussed that can not  guarantee she is not pregnant given unprotected sex this morning but that depo has not been shown to cause any fetal defects to infants exposed to it in utero. She would like the depo today and this was given.   5. Postpartum Evaluation of Pregnancy/Chronic Health Conditions: epigastric pain most concerning for GERD vs cholestasis. Reviewed symptoms. Start with ranitidine. She is to let me know if does not improve symptoms or if symptoms worsening.   6. Referrals: none.  7. Next physical exam due in one year.

## 2021-06-19 NOTE — LETTER
Letter by Keily Houser PA-C at      Author: Keily Houser PA-C Service: -- Author Type: --    Filed:  Encounter Date: 4/18/2019 Status: (Other)         04/18/19    To Whom It May Concern:    Joseph Luevano was seen today at [facility] for Pregnancy Confirmation.    She is 5w6d.    Due Date: Estimated Date of Delivery: 12/13/19     Thank you.    Keily Houser PA-C

## 2021-06-20 ENCOUNTER — HEALTH MAINTENANCE LETTER (OUTPATIENT)
Age: 26
End: 2021-06-20

## 2021-06-20 NOTE — ANESTHESIA PREPROCEDURE EVALUATION
Anesthesia Evaluation      Patient summary reviewed   No history of anesthetic complications     Airway   Mallampati: II  Neck ROM: full   Pulmonary - normal exam   (-) pneumonia, COPD, asthma, shortness of breath, recent URI, sleep apnea, not a smoker                         Cardiovascular - normal exam  Exercise tolerance: > or = 4 METS  (-) hypertension, past MI, CAD, angina, CHF   Neuro/Psych    (-) no seizures, no CVA    Endo/Other    (+) obesity (BMI 33.1),   (-) no diabetes, hypothyroidism     GI/Hepatic/Renal    (+)   impaired hepatic function  (-) GERD, renal disease     Other findings: 22 y/o with recent C/S in May.     Labs 9/6/18:  Hgb 12.8, Plt 268  Na 143, K 3.5, BUN 10, Cr 0.76  Tbili 3.4, Direct Bili 2.0, ,         Dental - normal exam                        Anesthesia Plan  Planned anesthetic: general endotracheal  Zofran/Decadron  ASA 2   Induction: intravenous   Anesthetic plan and risks discussed with: patient  Anesthesia plan special considerations: antiemetics,   Post-op plan: routine recovery

## 2021-06-20 NOTE — PROGRESS NOTES
Second attempt to reach patient for hospital discharge follow up.  voice mail full.  RN has made two unsuccessful attempts to reach patient.   Encounter closed.

## 2021-06-20 NOTE — PROGRESS NOTES
Hospital discharge follow up call to pt, no answer, voice mailbox full.  RN will attempt call back at another time.

## 2021-06-20 NOTE — LETTER
Letter by Sherry Douglas DO at      Author: Sherry Douglas DO Service: -- Author Type: --    Filed:  Encounter Date: 2/6/2020 Status: (Other)         February 10, 2020     Patient: Joseph Luevano   YOB: 1995   Date of Visit: 2/6/2020       To Whom It May Concern:    It is my medical opinion that Joseph Luevano may return to work on 2/17/20 without any restrictions.     If you have any questions or concerns, please don't hesitate to call.    Sincerely,        Electronically signed by Sherry Douglas DO

## 2021-06-20 NOTE — ANESTHESIA POSTPROCEDURE EVALUATION
Patient: Joseph Luevano  ENDOSCOPIC RETROGRADE CHOLANGIOPANCREATOGRAPHY, SPHINCTEROTOMY; PANCREATIC DUCT STENT PLACEMENT, CHOLECYSTECTOMY, LAPAROSCOPIC  Anesthesia type: general    Patient location: PACU  Last vitals:   Vitals:    09/06/18 1910   BP: 120/74   Pulse: 61   Resp: 15   Temp:    SpO2: 97%     Post vital signs: stable  Level of consciousness: awake and responds to simple questions  Post-anesthesia pain: pain controlled  Post-anesthesia nausea and vomiting: no  Pulmonary: unassisted, return to baseline  Cardiovascular: stable and blood pressure at baseline  Hydration: adequate  Anesthetic events: no    QCDR Measures:  ASA# 11 - Roxana-op Cardiac Arrest: ASA11B - Patient did NOT experience unanticipated cardiac arrest  ASA# 12 - Roxana-op Mortality Rate: ASA12B - Patient did NOT die  ASA# 13 - PACU Re-Intubation Rate: ASA13B - Patient did NOT require a new airway mgmt  ASA# 10 - Composite Anes Safety: ASA10A - No serious adverse event    Additional Notes:

## 2021-06-25 NOTE — PROGRESS NOTES
Progress Notes by Shruti Rubio MD at 11/1/2017 10:50 AM     Author: Shruit Rubio MD Service: -- Author Type: Physician    Filed: 11/1/2017  1:40 PM Encounter Date: 11/1/2017 Status: Signed    : Shruti Rubio MD (Physician)           Click to link to [facility] HEART CARE NOTE    Thank you, Dr. Douglas, for asking us to see Joseph Luevano at the [facility].      Assessment/Recommendations   Assessment:    22-year-old woman with history of congenital heart disease status post surgery at age 4 years old to [facility] who is currently 12 weeks pregnant with her first child.  She is referred for a consultation due to her heart history and current pregnancy.  I do not have any records here is unclear what type of defect she had and what type of repair she had for it.  Will recommend undergoing an echocardiogram for further evaluation and follow-up will be depending on results of the study.       History of Present Illness    Ms. Joseph Luevano is a 22 y.o. female with history of congenital heart disease status post cardiac surgery at age 5-6 years old in [facility] who I am seeing today for initial consultation.  Currently she is 12 weeks pregnant with her first child.  She deferred due to her heart history.  She does not recall what her heart defect was.  She reports that she underwent surgery to close a hole and was told that she did not need to follow-up.  She reports feeling well and has not had any problems with palpitations, lightheadedness, chest pain or breathing difficulty.  Twelve-lead EKG shows normal sinus rhythm with sinus arrhythmia at 75 bpm       Physical Examination Review of Systems   Vitals:    11/01/17 1055   BP: 110/70   Pulse: 64   Resp: 18     Body mass index is 31.71 kg/(m^2).  Wt Readings from Last 3 Encounters:   11/01/17 179 lb (81.2 kg)   10/30/17 182 lb (82.6 kg)   09/26/17 181 lb (82.1 kg)       General  Appearance:   alert, no apparent distress   HEENT:  no scleral icterus; the mucous membranes are pink and moist                                  Neck: jugular venous pressure normal   Chest: the spine is straight and the chest is symmetric   Lungs:   respirations unlabored; the lungs are clear to auscultation   Cardiovascular:   regular rhythm with normal first and second heart sounds and no murmurs or gallops; carotid pulses are intact and there are no carotid  bruits.   Abdomen:  no organomegaly, masses, bruits, or tenderness; bowel sounds are present   Extremities: no edema   Skin: no xanthelasma    General: Weight Loss  Eyes: WNL  Ears/Nose/Throat: WNL  Lungs: WNL  Heart: WNL  Stomach: Constipation, Diarrhea, Heartburn, Nausea  Bladder: Frequent Urination at Night  Muscle/Joints: WNL  Skin: WNL  Nervous System: WNL  Mental Health: WNL     Blood: WNL     Medical History  Surgical History Family History Social History   Past Medical History:   Diagnosis Date   ? Marijuana use     Past Surgical History:   Procedure Laterality Date   ? CARDIAC SURGERY      as a child    Family History   Problem Relation Age of Onset   ? Diabetes Mother    ? Hypertension Mother    ? No Medical Problems Father    ? Diabetes Sister    ? No Medical Problems Brother    ? No Medical Problems Sister    ? No Medical Problems Brother    ? No Medical Problems Brother     Social History     Social History   ? Marital status: Single     Spouse name: N/A   ? Number of children: N/A   ? Years of education: N/A     Occupational History   ? Not on file.     Social History Main Topics   ? Smoking status: Never Smoker   ? Smokeless tobacco: Never Used      Comment: boyfriend smokes outside   ? Alcohol use No   ? Drug use: No      Comment: used to smoke marijuana   ? Sexual activity: Yes     Partners: Male     Other Topics Concern   ? Not on file     Social History Narrative          Medications  Allergies   Current Outpatient Prescriptions    Medication Sig Dispense Refill   ? doxylamine (UNISON) 25 mg tablet Take 1/2 tablet every 6-8 hours, as needed for nausea. 35 tablet 0   ? prenatal vit-iron fum-folic ac (PRENATAL VITAMIN) 27 mg iron- 0.8 mg Tab Take 1 tablet by mouth once daily. 100 tablet 3   ? pyridoxine, vitamin B6, (VITAMIN B-6) 25 MG tablet Take 1 tablet by mouth 3 times a day, as needed for nausea. 60 tablet 0     No current facility-administered medications for this visit.       No Known Allergies      Lab Results    Chemistry/lipid CBC Cardiac Enzymes/BNP/TSH/INR   No results found for: CHOL, HDL, LDLCALC, TRIG, CREATININE, BUN, K, NA, CL, CO2 Lab Results   Component Value Date    WBC 13.6 (H) 10/30/2017    HGB 13.3 10/30/2017    HCT 39.8 10/30/2017    MCV 97 10/30/2017     10/30/2017    No results found for: CKTOTAL, CKMB, CKMBINDEX, TROPONINI, BNP, TSH, INR

## 2021-06-26 NOTE — PROGRESS NOTES
[facility] CLINIC EXAM NOTE      Chief Complaint   Patient presents with     Abdominal Pain     Feels like my insides are twisting     Wrist Pain     Right     Chest Pain       ASSESSMENT & PLAN    Joseph was seen today for abdominal pain, wrist pain and chest pain.    Diagnoses and all orders for this visit:    Anxiety attack reviewed with patient that her constellation of symptoms is most consistent with a panic attack.  Check labs below to just rule out any other possible cause to contribute such as electrolyte abnormality, anemia or thyroid disease.  Will start hydroxyzine just 10 mg to use up to 3 times a day as needed.  After discussion she also thinks she may be having anxiety attacks.  Under a lot of stress recently.  Active listening and support provided on mood.  Feeling very lonely and stressed recently no longer working so does not have any great outlets.  She declined therapy at this time but will consider it.  Consider other medications to treat daily anxiety or depression if we feel that is needed.  -     HM1(CBC and Differential)  -     Comprehensive Metabolic Panel  -     Thyroid Cascade  -     hydrOXYzine HCL (ATARAX) 10 MG tablet; Take 1 tablet (10 mg total) by mouth 3 (three) times a day as needed for anxiety.    Atypical chest pain: Check labs below but most likely consistent with anxiety attack as above.  Her exam is otherwise benign.  The episodes were brief 1 to 2 minutes and she seemed to be able to calm herself down.  Reviewed warning signs and symptoms such as syncopal episode or palpitations.  If she gets that she is to return we will do an EKG.  Possible echo.  She states understanding agreeable to plan.  -     HM1(CBC and Differential)  -     Comprehensive Metabolic Panel  -     Thyroid Cascade  -     Magnesium    Right wrist pain: There is tenderness at the joint.  Likely strained or sprained.  Will provide wrist splint today to protect the joint.  Discussed ice and ibuprofen to help  with inflammation.  Follow-up if no improvement in a few weeks.  -     Wrist/Arm DME: Wrist Brace; Right; non-thumb spica    Muscle spasm: She describes a muscle spasm while turning around in the car.  In addition to the labs above we will check a magnesium level today.  Possibly dehydrated but she states she drinks a lot of water.  -     Magnesium      HISTORY    Joseph Luevano is a 26 y.o.  female who presents for the following issues:    Constellation of symptoms. Kept delaying her appointment.  Early may. Hotness over chest. Heart pounding and slowing down. Couldn't stand up straight  Sweating.   Chest felt like it was tightening up.  Wait to call ambulance  After 1 minute or two went away.   Going to [place] sitting in front seat>Turned and turned back and felt a twisting. Couldn't sit up.   Had to stretch out my body  everytime I had to   Twisting in my insides  Twicea lready this year.   Drank water. Too fast. Same pain.  driking a lot of water.   Very stressed recently.   Keep having feeling like I can't breath.   You can breath you can brath\ something in my head tells me I cna't breath.   Making it up in my head?  Twice last week this happened     Right wrist pain  Two months ago  Injured while working out in march  Pain at the joint ever since   no swelling, bruising or redness  Body aching for day  Sore to the touch    Feeling lonely   not supportive over her emotions  Family and friends in [place]  She has stopped working  Over thinking things  Making me depressed  Feel better now.     MEDICATIONS    Current Outpatient Medications on File Prior to Visit   Medication Sig Dispense Refill     etonogestrel (NEXPLANON) 68 mg Impl implant Inserted 1/13/20 1 each 0     No current facility-administered medications on file prior to visit.          REVIEW OF SYSTEMS    ROS: 10 pt review of systems performed and all negative except noted in HPI.     PHYSICAL EXAM    /71 (Patient Site: Left Arm, Patient  Position: Sitting, Cuff Size: Adult Regular)   Pulse 77   Temp 97.3  F (36.3  C) (Temporal)   Resp 14   Wt 198 lb 14.4 oz (90.2 kg)   LMP 06/02/2021   BMI 36.38 kg/m    GEN:  26 y.o. female sitting comfortably in no apparent distress.   HEENT: EOMI, no scleral icterus, buccal mucosa moist  Neck: Supple without lymphadenopathy or thyromegally   CHEST/LUNG: No respiratory distress, good air flow to bases, CTAB   CV: RRR, S1, S2 normal; no murmurs, rubs or gallops. No edema. Pulses radial +2/4  ABD:  Soft, non-tender, non distended, no guarding,  No masses  MSK:  Strength grossly normal 5/5 upper extremities. TTP at the right wrist radial joint. No swelling, redness, bruising, creptius  SKIN: warm, dry, no rashes or lesions  NEURO: Gait normal, coordination intact, CN II-XII grossly intact  PSYCH:  Mood and affect appropriate      At the end of the encounter, I discussed results, diagnosis, medications. Discussed red flags for immediate return to clinic/ER, as well as indications for follow up if no improvement. Patient and/or caregiver understood and agreed to plan. Patient was stable for discharge.      Sherry Douglas

## 2021-07-03 NOTE — ADDENDUM NOTE
Addendum Note by Sherry Douglas DO at 1/13/2020  2:00 PM     Author: Sherry Douglas DO Service: -- Author Type: Physician    Filed: 1/14/2020 10:55 PM Encounter Date: 1/13/2020 Status: Signed    : Sherry Douglas DO (Physician)    Addended by: SHERRY DOUGLAS on: 1/14/2020 10:55 PM        Modules accepted: Level of Service

## 2021-07-03 NOTE — ADDENDUM NOTE
Addendum Note by Sherry Douglas DO at 2/16/2018  6:06 PM     Author: Sherry Douglas DO Service: -- Author Type: Physician    Filed: 2/16/2018  6:06 PM Encounter Date: 2/16/2018 Status: Signed    : Sherry Douglas DO (Physician)    Addended by: SHERRY DOUGLAS on: 2/16/2018 06:06 PM        Modules accepted: Orders

## 2021-07-03 NOTE — ADDENDUM NOTE
Addendum Note by Butch Mcgowan MD at 5/27/2018  8:10 AM     Author: Butch Mcgowan MD Service: -- Author Type: Physician    Filed: 5/27/2018  8:10 AM Date of Service: 5/27/2018  8:10 AM Status: Signed    : Butch Mcgowan MD (Physician)       Addendum  created 05/27/18 0810 by Butch Mcgowan MD    Sign clinical note

## 2021-07-06 VITALS
DIASTOLIC BLOOD PRESSURE: 71 MMHG | HEART RATE: 77 BPM | SYSTOLIC BLOOD PRESSURE: 109 MMHG | BODY MASS INDEX: 36.38 KG/M2 | TEMPERATURE: 97.3 F | WEIGHT: 198.9 LBS | RESPIRATION RATE: 14 BRPM

## 2021-07-14 PROBLEM — O26.893 LOW BACK PAIN DURING PREGNANCY IN THIRD TRIMESTER: Status: RESOLVED | Noted: 2019-10-01 | Resolved: 2020-01-14

## 2021-07-14 PROBLEM — Z3A.16 16 WEEKS GESTATION OF PREGNANCY: Status: RESOLVED | Noted: 2019-04-18 | Resolved: 2019-08-25

## 2021-07-14 PROBLEM — O28.3 ABNORMAL FETAL ULTRASOUND: Status: RESOLVED | Noted: 2018-05-26 | Resolved: 2019-04-18

## 2021-07-14 PROBLEM — Z34.93 ENCOUNTER FOR SUPERVISION OF LOW-RISK PREGNANCY IN THIRD TRIMESTER: Status: RESOLVED | Noted: 2019-08-25 | Resolved: 2020-01-14

## 2021-07-14 PROBLEM — O33.9 CPD (CEPHALO-PELVIC DISPROPORTION): Status: RESOLVED | Noted: 2019-12-03 | Resolved: 2020-01-14

## 2021-07-14 PROBLEM — O99.891 BACTERIURIA IN PREGNANCY: Status: RESOLVED | Noted: 2019-04-21 | Resolved: 2020-01-14

## 2021-07-14 PROBLEM — O41.00X0 OLIGOHYDRAMNIOS: Status: RESOLVED | Noted: 2018-05-26 | Resolved: 2019-04-18

## 2021-07-14 PROBLEM — O99.810 ABNORMAL GLUCOSE TOLERANCE IN PREGNANCY: Status: RESOLVED | Noted: 2018-03-20 | Resolved: 2019-04-18

## 2021-07-14 PROBLEM — M54.50 LOW BACK PAIN DURING PREGNANCY IN THIRD TRIMESTER: Status: RESOLVED | Noted: 2019-10-01 | Resolved: 2020-01-14

## 2021-07-14 PROBLEM — O34.219 HX OF CESAREAN SECTION COMPLICATING PREGNANCY: Status: RESOLVED | Noted: 2019-09-29 | Resolved: 2020-01-14

## 2021-07-14 PROBLEM — Z34.00 NORMAL PREGNANCY, FIRST: Status: RESOLVED | Noted: 2017-09-26 | Resolved: 2019-04-18

## 2021-07-14 PROBLEM — R82.71 BACTERIURIA IN PREGNANCY: Status: RESOLVED | Noted: 2019-04-21 | Resolved: 2020-01-14

## 2021-07-14 PROBLEM — O48.0 POST-TERM PREGNANCY, 40-42 WEEKS OF GESTATION: Status: RESOLVED | Noted: 2018-05-26 | Resolved: 2019-04-18

## 2021-10-10 ENCOUNTER — HEALTH MAINTENANCE LETTER (OUTPATIENT)
Age: 26
End: 2021-10-10

## 2021-10-13 ENCOUNTER — TELEPHONE (OUTPATIENT)
Dept: FAMILY MEDICINE | Facility: CLINIC | Age: 26
End: 2021-10-13

## 2021-10-13 NOTE — TELEPHONE ENCOUNTER
Reason for Call:  pregnancy confirmation needed  date of beggining of last period was 9/5/21  home pregnancy test on 10/7/21 positive  wanting to be seen next week/or when ever Dr. Douglas is available/please contact patient for next route of care and can leave a detailed message    PCP: Sherry Douglas      Can we leave a detailed message on this number? YES    Phone number patient can be reached at: Cell number on file:    Telephone Information:   Mobile [phone]       Call taken on 10/13/2021 at 3:11 PM by Lindsay Strong

## 2021-10-28 ENCOUNTER — OFFICE VISIT (OUTPATIENT)
Dept: FAMILY MEDICINE | Facility: CLINIC | Age: 26
End: 2021-10-28
Payer: COMMERCIAL

## 2021-10-28 VITALS
HEART RATE: 67 BPM | BODY MASS INDEX: 37.49 KG/M2 | DIASTOLIC BLOOD PRESSURE: 71 MMHG | TEMPERATURE: 97.7 F | RESPIRATION RATE: 16 BRPM | WEIGHT: 205 LBS | SYSTOLIC BLOOD PRESSURE: 107 MMHG

## 2021-10-28 DIAGNOSIS — Z32.01 PREGNANCY TEST POSITIVE: ICD-10-CM

## 2021-10-28 DIAGNOSIS — N92.6 ABNORMAL MENSES: Primary | ICD-10-CM

## 2021-10-28 DIAGNOSIS — Z3A.01 LESS THAN 8 WEEKS GESTATION OF PREGNANCY: ICD-10-CM

## 2021-10-28 DIAGNOSIS — Z87.74 HISTORY OF CONGENITAL HEART DEFECT: ICD-10-CM

## 2021-10-28 DIAGNOSIS — F12.91 HISTORY OF MARIJUANA USE: ICD-10-CM

## 2021-10-28 LAB
ABO/RH(D): NORMAL
AMPHETAMINES UR QL SCN: NORMAL
ANTIBODY SCREEN: NEGATIVE
BARBITURATES UR QL: NORMAL
BASOPHILS # BLD AUTO: 0 10E3/UL (ref 0–0.2)
BASOPHILS NFR BLD AUTO: 0 %
BENZODIAZ UR QL: NORMAL
CANNABINOIDS UR QL SCN: NORMAL
COCAINE UR QL: NORMAL
CREAT UR-MCNC: 211 MG/DL
EOSINOPHIL # BLD AUTO: 0.1 10E3/UL (ref 0–0.7)
EOSINOPHIL NFR BLD AUTO: 1 %
ERYTHROCYTE [DISTWIDTH] IN BLOOD BY AUTOMATED COUNT: 12.2 % (ref 10–15)
HBA1C MFR BLD: 5.3 % (ref 0–5.6)
HCG UR QL: POSITIVE
HCT VFR BLD AUTO: 40 % (ref 35–47)
HGB BLD-MCNC: 13.2 G/DL (ref 11.7–15.7)
HIV 1+2 AB+HIV1 P24 AG SERPL QL IA: NEGATIVE
IMM GRANULOCYTES # BLD: 0 10E3/UL
IMM GRANULOCYTES NFR BLD: 0 %
LYMPHOCYTES # BLD AUTO: 2.4 10E3/UL (ref 0.8–5.3)
LYMPHOCYTES NFR BLD AUTO: 20 %
MCH RBC QN AUTO: 31.4 PG (ref 26.5–33)
MCHC RBC AUTO-ENTMCNC: 33 G/DL (ref 31.5–36.5)
MCV RBC AUTO: 95 FL (ref 78–100)
MONOCYTES # BLD AUTO: 0.6 10E3/UL (ref 0–1.3)
MONOCYTES NFR BLD AUTO: 5 %
NEUTROPHILS # BLD AUTO: 8.6 10E3/UL (ref 1.6–8.3)
NEUTROPHILS NFR BLD AUTO: 73 %
OPIATES UR QL SCN: NORMAL
OXYCODONE UR QL: NORMAL
PCP UR QL SCN: NORMAL
PLATELET # BLD AUTO: 271 10E3/UL (ref 150–450)
RBC # BLD AUTO: 4.21 10E6/UL (ref 3.8–5.2)
SPECIMEN EXPIRATION DATE: NORMAL
WBC # BLD AUTO: 11.7 10E3/UL (ref 4–11)

## 2021-10-28 PROCEDURE — 86780 TREPONEMA PALLIDUM: CPT | Performed by: PHYSICIAN ASSISTANT

## 2021-10-28 PROCEDURE — 83655 ASSAY OF LEAD: CPT | Mod: 90 | Performed by: PHYSICIAN ASSISTANT

## 2021-10-28 PROCEDURE — 83036 HEMOGLOBIN GLYCOSYLATED A1C: CPT | Performed by: PHYSICIAN ASSISTANT

## 2021-10-28 PROCEDURE — 99000 SPECIMEN HANDLING OFFICE-LAB: CPT | Performed by: PHYSICIAN ASSISTANT

## 2021-10-28 PROCEDURE — 87088 URINE BACTERIA CULTURE: CPT | Performed by: PHYSICIAN ASSISTANT

## 2021-10-28 PROCEDURE — 99207 PR FIRST OB VISIT: CPT | Performed by: PHYSICIAN ASSISTANT

## 2021-10-28 PROCEDURE — 90471 IMMUNIZATION ADMIN: CPT | Performed by: PHYSICIAN ASSISTANT

## 2021-10-28 PROCEDURE — 86762 RUBELLA ANTIBODY: CPT | Performed by: PHYSICIAN ASSISTANT

## 2021-10-28 PROCEDURE — 86803 HEPATITIS C AB TEST: CPT | Performed by: PHYSICIAN ASSISTANT

## 2021-10-28 PROCEDURE — 86900 BLOOD TYPING SEROLOGIC ABO: CPT | Performed by: PHYSICIAN ASSISTANT

## 2021-10-28 PROCEDURE — 90686 IIV4 VACC NO PRSV 0.5 ML IM: CPT | Performed by: PHYSICIAN ASSISTANT

## 2021-10-28 PROCEDURE — 99213 OFFICE O/P EST LOW 20 MIN: CPT | Performed by: PHYSICIAN ASSISTANT

## 2021-10-28 PROCEDURE — 87186 SC STD MICRODIL/AGAR DIL: CPT | Performed by: PHYSICIAN ASSISTANT

## 2021-10-28 PROCEDURE — 85025 COMPLETE CBC W/AUTO DIFF WBC: CPT | Performed by: PHYSICIAN ASSISTANT

## 2021-10-28 PROCEDURE — 87389 HIV-1 AG W/HIV-1&-2 AB AG IA: CPT | Performed by: PHYSICIAN ASSISTANT

## 2021-10-28 PROCEDURE — 81025 URINE PREGNANCY TEST: CPT | Performed by: PHYSICIAN ASSISTANT

## 2021-10-28 PROCEDURE — 87340 HEPATITIS B SURFACE AG IA: CPT | Performed by: PHYSICIAN ASSISTANT

## 2021-10-28 PROCEDURE — 86901 BLOOD TYPING SEROLOGIC RH(D): CPT | Performed by: PHYSICIAN ASSISTANT

## 2021-10-28 PROCEDURE — 86850 RBC ANTIBODY SCREEN: CPT | Performed by: PHYSICIAN ASSISTANT

## 2021-10-28 PROCEDURE — 80307 DRUG TEST PRSMV CHEM ANLYZR: CPT | Performed by: PHYSICIAN ASSISTANT

## 2021-10-28 PROCEDURE — 87086 URINE CULTURE/COLONY COUNT: CPT | Performed by: PHYSICIAN ASSISTANT

## 2021-10-28 PROCEDURE — 36415 COLL VENOUS BLD VENIPUNCTURE: CPT | Performed by: PHYSICIAN ASSISTANT

## 2021-10-28 RX ORDER — PNV NO.95/FERROUS FUM/FOLIC AC 28MG-0.8MG
1 TABLET ORAL DAILY
Qty: 100 CAPSULE | Refills: 3
Start: 2021-10-28

## 2021-10-28 RX ORDER — PYRIDOXINE HCL (VITAMIN B6) 25 MG
25 TABLET ORAL EVERY 6 HOURS PRN
Qty: 35 TABLET | Refills: 0 | Status: SHIPPED | OUTPATIENT
Start: 2021-10-28 | End: 2021-12-11

## 2021-10-28 NOTE — LETTER
10/28/21          To Whom It May Concern:      Joseph Luevano (1995) is pregnant.  Estimated Date of Delivery: Jun 12, 2022        Sincerely,    Keily Houser PA-C

## 2021-10-28 NOTE — PATIENT INSTRUCTIONS
Pregnancy: 7 weeks    Due Date: June 12, 2022    Next visit in 4 weeks  With Dr. Douglas  For Your First OB Visit

## 2021-10-29 LAB
HBV SURFACE AG SERPL QL IA: NONREACTIVE
HCV AB SERPL QL IA: NEGATIVE
RUBV IGG SERPL QL IA: 1.48 INDEX
RUBV IGG SERPL QL IA: POSITIVE
T PALLIDUM AB SER QL: NONREACTIVE

## 2021-10-31 DIAGNOSIS — R82.71 BACTERIURIA DURING PREGNANCY: Primary | ICD-10-CM

## 2021-10-31 DIAGNOSIS — O99.891 BACTERIURIA DURING PREGNANCY: Primary | ICD-10-CM

## 2021-10-31 LAB — BACTERIA UR CULT: ABNORMAL

## 2021-10-31 RX ORDER — AMOXICILLIN AND CLAVULANATE POTASSIUM 500; 125 MG/1; MG/1
1 TABLET, FILM COATED ORAL 2 TIMES DAILY
Qty: 14 TABLET | Refills: 0 | Status: SHIPPED | OUTPATIENT
Start: 2021-10-31 | End: 2021-11-07

## 2021-11-01 NOTE — PROGRESS NOTES
ASSESSMENT and PLAN:  1. Pregnancy Intake Appointment  Joseph Luevano is 26 year old and .  Patient's last menstrual period was 2021 (exact date).  Estimated Date of Delivery: 2022  She will be seeing Dr. Douglas for OB care at [facility] (formerly [facility]).  Screening pregnancy lab work was drawn.  Prenatal vitamin prescribed.  Problem list and current medications reviewed and updated.  Dating ultrasound ordered.  Potential exposures/risks discussed: work environment, raw meat/seafood/deli meat, home construction, cats, caffeine.  Follow up in 4 weeks.    2. History of marijuana use  No marijuana use since she found out she was pregnant.  She requested a drug screen today.  She wants to see if marijuana is out of her system yet.  - Urine Drugs of Abuse Screen Panel 1 - Drug Screen (Full)    3. History of congenital heart defect  Surgically repaired as a child.  Normal Echo and no need for further follow-up, per cardiology.    4. History of C-sections in ,     5. History of mild post-partum depression with previous pregnancies      HPI:  Joseph Luevano is a 26 year old female here for pregnancy intake.  She is .  Patient's last menstrual period was 2021 (exact date).  Positive home pregnancy test on 10/7/21.    Past Medical History:   Diagnosis Date     Abnormal fetal ultrasound 2018 = BPP 2/8     Abnormal glucose tolerance in pregnancy 3/20/2018    1hr *. 3hr GTT 76, 152, 108, 101.     Acute appendicitis 2019     Appendicitis 2019    Added automatically from request for surgery [id number]     Bacteriuria in pregnancy 2019     Congenital heart defect 2017    Normal echocardiogram. No follow up needed, per cardiology (see echo report).     CPD (cephalo-pelvic disproportion) 12/3/2019     Delivery by  section of full-term infant 2018     Hx of  section complicating pregnancy 2019     Low back pain during pregnancy in third trimester  10/1/2019     Marijuana abuse in remission 2018    Stopped when she became pregnant.     Oligohydramnios 2018     Oligohydramnios in third trimester, single or unspecified fetus      Post-term pregnancy, 40-42 weeks of gestation 2018    41+ weeks     Postpartum depression      Retrocecal appendix         Past Surgical History:   Procedure Laterality Date     APPENDECTOMY       C/SECTION, LOW TRANSVERSE       CARDIAC SURGERY      as a child      SECTION N/A 2018    Procedure:  SECTION;  Surgeon: Ming Potter MD;  Location: [facility] OR;  Service:       SECTION N/A 12/3/2019    Procedure:  SECTION, REPEAT;  Surgeon: Ming Potter MD;  Location: [facility] OR;  Service: Obstetrics     LAPAROSCOPIC CHOLECYSTECTOMY N/A 2018    Procedure: CHOLECYSTECTOMY, LAPAROSCOPIC;  Surgeon: Rafi Newsome MD;  Location: [facility] OR;  Service:      CO ERCP W/SPHINCTEROTOMY/PAPILLOTOMY N/A 2018    Procedure: ENDOSCOPIC RETROGRADE CHOLANGIOPANCREATOGRAPHY, SPHINCTEROTOMY; PANCREATIC DUCT STENT PLACEMENT;  Surgeon: Malik Edwards MD;  Location: [facility] OR;  Service: Gastroenterology     CO LAP,APPENDECTOMY N/A 2019    Procedure: APPENDECTOMY, LAPAROSCOPIC;  Surgeon: Devon Ellison MD;  Location: [facility] OR;  Service: General        Current Outpatient Medications   Medication     doxylamine (UNISOM) 25 MG TABS tablet     hydrOXYzine HCL (ATARAX) 10 MG tablet     Prenatal MV-Min-Fe Fum-FA-DHA (PRENATAL MULTIVITAMIN PLUS DHA) 27-0.8-250 MG CAPS     pyridOXINE (VITAMIN B6) 25 MG tablet     No current facility-administered medications for this visit.        Social History     Socioeconomic History     Marital status: Single     Spouse name: Not on file     Number of children: Not on file     Years of education: Not on file     Highest education level: Not on file   Occupational History     Not on file   Tobacco Use     Smoking  status: Never Smoker     Smokeless tobacco: Never Used     Tobacco comment: boyfriend smokes outside   Substance and Sexual Activity     Alcohol use: No     Drug use: No     Comment: Drug use: used to smoke marijuana     Sexual activity: Yes     Partners: Male     Birth control/protection: None   Other Topics Concern     Not on file   Social History Narrative     Not on file     Social Determinants of Health     Financial Resource Strain:      Difficulty of Paying Living Expenses:    Food Insecurity:      Worried About Running Out of Food in the Last Year:      Ran Out of Food in the Last Year:    Transportation Needs:      Lack of Transportation (Medical):      Lack of Transportation (Non-Medical):    Physical Activity:      Days of Exercise per Week:      Minutes of Exercise per Session:    Stress:      Feeling of Stress :    Social Connections:      Frequency of Communication with Friends and Family:      Frequency of Social Gatherings with Friends and Family:      Attends Anabaptist Services:      Active Member of Clubs or Organizations:      Attends Club or Organization Meetings:      Marital Status:    Intimate Partner Violence:      Fear of Current or Ex-Partner:      Emotionally Abused:      Physically Abused:      Sexually Abused:           OBJECTIVE:  No Known Allergies  /71 (BP Location: Left arm, Patient Position: Sitting, Cuff Size: Adult Regular)   Pulse 67   Temp 97.7  F (36.5  C) (Oral)   Resp 16   Wt 93 kg (205 lb)   LMP 09/05/2021 (Exact Date)   BMI 37.49 kg/m     Body mass index is 37.49 kg/m .     Vital signs stable as recorded above.  General: Patient is alert and oriented x 3, in no apparent distress  Remainder of physical exam deferred to patient's First OB Visit    Office Visit on 10/28/2021   Component Date Value Ref Range Status     hCG Urine Qualitative 10/28/2021 Positive* Negative Final     Culture 10/28/2021 >100,000 CFU/mL Enterococcus faecalis*  Final     Amphetamines Urine  10/28/2021 Screen Negative  Screen Negative Final     Benzodiazepines Urine 10/28/2021 Screen Negative  Screen Negative Final     Opiates Urine 10/28/2021 Screen Negative  Screen Negative Final     PCP Urine 10/28/2021 Screen Negative  Screen Negative Final     Cannabinoids Urine 10/28/2021 Screen Negative  Screen Negative Final     Barbiturates Urine 10/28/2021 Screen Negative  Screen Negative Final     Cocaine Urine 10/28/2021 Screen Negative  Screen Negative Final     Oxycodone Urine 10/28/2021 Screen Negative  Screen Negative Final     Creatinine Urine mg/dL 10/28/2021 211  mg/dL Final     ABO/RH(D) 10/28/2021 B POS   Final     Antibody Screen 10/28/2021 Negative  Negative Final     SPECIMEN EXPIRATION DATE 10/28/2021 [id number]   Final     Hepatitis B Surface Antigen 10/28/2021 Nonreactive  Nonreactive Final     HIV Antigen Antibody Combo 10/28/2021 Negative  Negative Final     Rubella Genesis IgG Instrument Value 10/28/2021 1.48* <0.90 Index Final     Rubella Antibody IgG 10/28/2021 Positive* Negative Final     Treponema Antibody Total 10/28/2021 Nonreactive  Nonreactive Final     Hemoglobin A1C 10/28/2021 5.3  0.0 - 5.6 % Final     Hepatitis C Antibody 10/28/2021 Negative  Negative Final     WBC Count 10/28/2021 11.7* 4.0 - 11.0 10e3/uL Final     RBC Count 10/28/2021 4.21  3.80 - 5.20 10e6/uL Final     Hemoglobin 10/28/2021 13.2  11.7 - 15.7 g/dL Final     Hematocrit 10/28/2021 40.0  35.0 - 47.0 % Final     MCV 10/28/2021 95  78 - 100 fL Final     MCH 10/28/2021 31.4  26.5 - 33.0 pg Final     MCHC 10/28/2021 33.0  31.5 - 36.5 g/dL Final     RDW 10/28/2021 12.2  10.0 - 15.0 % Final     Platelet Count 10/28/2021 271  150 - 450 10e3/uL Final     % Neutrophils 10/28/2021 73  % Final     % Lymphocytes 10/28/2021 20  % Final     % Monocytes 10/28/2021 5  % Final     % Eosinophils 10/28/2021 1  % Final     % Basophils 10/28/2021 0  % Final     % Immature Granulocytes 10/28/2021 0  % Final     Absolute Neutrophils  10/28/2021 8.6* 1.6 - 8.3 10e3/uL Final     Absolute Lymphocytes 10/28/2021 2.4  0.8 - 5.3 10e3/uL Final     Absolute Monocytes 10/28/2021 0.6  0.0 - 1.3 10e3/uL Final     Absolute Eosinophils 10/28/2021 0.1  0.0 - 0.7 10e3/uL Final     Absolute Basophils 10/28/2021 0.0  0.0 - 0.2 10e3/uL Final     Absolute Immature Granulocytes 10/28/2021 0.0  <=0.0 10e3/uL Final          Other screening pregnancy lab work is pending.      Please see OB Episode for complete details.    This dictation uses voice recognition software, which may contain typographical errors.

## 2021-11-02 LAB — LEAD BLDV-MCNC: <2 UG/DL

## 2021-11-09 ENCOUNTER — HOSPITAL ENCOUNTER (OUTPATIENT)
Dept: ULTRASOUND IMAGING | Facility: HOSPITAL | Age: 26
Discharge: HOME | End: 2021-11-09
Attending: PHYSICIAN ASSISTANT | Admitting: PHYSICIAN ASSISTANT
Payer: COMMERCIAL

## 2021-11-09 DIAGNOSIS — Z3A.01 LESS THAN 8 WEEKS GESTATION OF PREGNANCY: ICD-10-CM

## 2021-11-09 PROCEDURE — 76801 OB US < 14 WKS SINGLE FETUS: CPT

## 2021-12-11 ENCOUNTER — MYC REFILL (OUTPATIENT)
Dept: FAMILY MEDICINE | Facility: CLINIC | Age: 26
End: 2021-12-11
Payer: COMMERCIAL

## 2021-12-11 DIAGNOSIS — Z32.01 PREGNANCY TEST POSITIVE: ICD-10-CM

## 2021-12-13 ENCOUNTER — PRENATAL OFFICE VISIT (OUTPATIENT)
Dept: FAMILY MEDICINE | Facility: CLINIC | Age: 26
End: 2021-12-13
Payer: COMMERCIAL

## 2021-12-13 VITALS
DIASTOLIC BLOOD PRESSURE: 81 MMHG | HEART RATE: 75 BPM | SYSTOLIC BLOOD PRESSURE: 126 MMHG | BODY MASS INDEX: 37.8 KG/M2 | WEIGHT: 205.4 LBS | HEIGHT: 62 IN

## 2021-12-13 DIAGNOSIS — Z87.74 HISTORY OF CONGENITAL HEART DEFECT: ICD-10-CM

## 2021-12-13 DIAGNOSIS — R82.71 BACTERIURIA DURING PREGNANCY: ICD-10-CM

## 2021-12-13 DIAGNOSIS — Z34.83 ENCOUNTER FOR SUPERVISION OF OTHER NORMAL PREGNANCY IN THIRD TRIMESTER: Primary | ICD-10-CM

## 2021-12-13 DIAGNOSIS — O99.891 BACTERIURIA DURING PREGNANCY: ICD-10-CM

## 2021-12-13 DIAGNOSIS — O34.219 HX OF CESAREAN SECTION COMPLICATING PREGNANCY: ICD-10-CM

## 2021-12-13 DIAGNOSIS — O99.212 OBESITY AFFECTING PREGNANCY IN SECOND TRIMESTER: ICD-10-CM

## 2021-12-13 PROCEDURE — 99213 OFFICE O/P EST LOW 20 MIN: CPT | Performed by: FAMILY MEDICINE

## 2021-12-13 PROCEDURE — 81003 URINALYSIS AUTO W/O SCOPE: CPT | Performed by: FAMILY MEDICINE

## 2021-12-13 RX ORDER — PYRIDOXINE HCL (VITAMIN B6) 25 MG
25 TABLET ORAL EVERY 6 HOURS PRN
Qty: 35 TABLET | Refills: 0 | Status: ON HOLD | OUTPATIENT
Start: 2021-12-13 | End: 2022-06-09

## 2021-12-13 ASSESSMENT — MIFFLIN-ST. JEOR: SCORE: 1620.07

## 2021-12-13 NOTE — PROGRESS NOTES
First OB - patches not working.   Feeling well.   This one I can eat anything. Morning sickness here and there. Don't have to eat it every day.   Getting good sleep  Back pain here   No pelvic pain, vaginal bleeding, discharge  No other concerns :      Taking PNV: yes      Labs/imaging reviewed  Discussed screening for aneuploidy and neural tube defects, SMA and CF.  Quad screen next visit     Preformed physical exam : see flow sheet. Discuss pap next visti    Reviewed DANTE, past medical history, past surgical history, family history, genetic history, and previous obstetrical history.    Encouraged good nutrition, well balanced diet, regular activity.  Discussed foods to avoid.  And other applicable safety/health risks in pregnancy.    Joseph was seen today for initial ob.    Diagnoses and all orders for this visit:    Encounter for supervision of other normal pregnancy in third trimester  -     Urinalysis, OB Screen; Future  -     Urinalysis, OB Screen    Bacteriuria during pregnancy  Comments:  s/p tx with augmentin. recheck in 3rd trimester    History of congenital heart defect  Comments:  Hx heart defect, surgically repaired as a child- normal echo and saw cards in 1st pregnancy    Hx of  section complicating pregnancy  Comments:  x2    Preeclampsia risk factors:  High risk factors (1+): none  Moderate risk factors (2+):  Obesity (body mass index >30 kg/m2),       Sherry Douglas,

## 2021-12-13 NOTE — TELEPHONE ENCOUNTER
Routing refill request to provider for review/approval because:  Drug not on the [facility] refill protocol     Last Written Prescription Date:  10/28/21  Last Fill Quantity: 35,  # refills: 0   Last office visit provider:  10/28/21     Requested Prescriptions   Pending Prescriptions Disp Refills     pyridOXINE (VITAMIN B6) 25 MG tablet 35 tablet 0     Sig: Take 1 tablet (25 mg) by mouth every 6 hours as needed (nausea, vomiting, poor appetite)       There is no refill protocol information for this order          Yeison Al RN 12/13/21 2:58 PM

## 2021-12-16 PROBLEM — O99.891 BACTERIURIA DURING PREGNANCY: Status: ACTIVE | Noted: 2021-12-16

## 2021-12-16 PROBLEM — R82.71 BACTERIURIA DURING PREGNANCY: Status: ACTIVE | Noted: 2021-12-16

## 2021-12-16 PROBLEM — Z3A.01 LESS THAN 8 WEEKS GESTATION OF PREGNANCY: Status: RESOLVED | Noted: 2021-10-28 | Resolved: 2021-12-16

## 2021-12-16 PROBLEM — Z34.83 ENCOUNTER FOR SUPERVISION OF OTHER NORMAL PREGNANCY IN THIRD TRIMESTER: Status: ACTIVE | Noted: 2021-12-16

## 2021-12-16 PROBLEM — Z87.74 HISTORY OF CONGENITAL HEART DEFECT: Status: ACTIVE | Noted: 2021-12-16

## 2022-01-10 ENCOUNTER — PRENATAL OFFICE VISIT (OUTPATIENT)
Dept: FAMILY MEDICINE | Facility: CLINIC | Age: 27
End: 2022-01-10
Payer: COMMERCIAL

## 2022-01-10 VITALS
RESPIRATION RATE: 16 BRPM | BODY MASS INDEX: 37.3 KG/M2 | WEIGHT: 204 LBS | SYSTOLIC BLOOD PRESSURE: 123 MMHG | HEART RATE: 103 BPM | DIASTOLIC BLOOD PRESSURE: 79 MMHG

## 2022-01-10 DIAGNOSIS — Z87.74 HISTORY OF CONGENITAL HEART DEFECT: ICD-10-CM

## 2022-01-10 DIAGNOSIS — Z91.09 ENVIRONMENTAL ALLERGIES: ICD-10-CM

## 2022-01-10 DIAGNOSIS — R82.71 BACTERIURIA DURING PREGNANCY: ICD-10-CM

## 2022-01-10 DIAGNOSIS — O99.891 BACTERIURIA DURING PREGNANCY: ICD-10-CM

## 2022-01-10 DIAGNOSIS — O34.219 HX OF CESAREAN SECTION COMPLICATING PREGNANCY: ICD-10-CM

## 2022-01-10 DIAGNOSIS — Z34.82 ENCOUNTER FOR SUPERVISION OF OTHER NORMAL PREGNANCY IN SECOND TRIMESTER: Primary | ICD-10-CM

## 2022-01-10 PROCEDURE — 99213 OFFICE O/P EST LOW 20 MIN: CPT | Performed by: FAMILY MEDICINE

## 2022-01-10 PROCEDURE — 36415 COLL VENOUS BLD VENIPUNCTURE: CPT | Performed by: FAMILY MEDICINE

## 2022-01-10 PROCEDURE — 87186 SC STD MICRODIL/AGAR DIL: CPT | Performed by: FAMILY MEDICINE

## 2022-01-10 PROCEDURE — 99207 PR PRENATAL VISIT: CPT | Performed by: FAMILY MEDICINE

## 2022-01-10 PROCEDURE — 81003 URINALYSIS AUTO W/O SCOPE: CPT | Performed by: FAMILY MEDICINE

## 2022-01-10 PROCEDURE — 87086 URINE CULTURE/COLONY COUNT: CPT | Performed by: FAMILY MEDICINE

## 2022-01-10 PROCEDURE — 99000 SPECIMEN HANDLING OFFICE-LAB: CPT | Performed by: FAMILY MEDICINE

## 2022-01-10 PROCEDURE — 81511 FTL CGEN ABNOR FOUR ANAL: CPT | Mod: 90 | Performed by: FAMILY MEDICINE

## 2022-01-10 RX ORDER — CETIRIZINE HYDROCHLORIDE 10 MG/1
10 TABLET ORAL DAILY
Qty: 30 TABLET | Refills: 3 | Status: SHIPPED | OUTPATIENT
Start: 2022-01-10 | End: 2022-05-17

## 2022-01-10 NOTE — PROGRESS NOTES
No ctx, lof, bleeding, or discharge.  No HA or vision changes.  Good FM : not sure    Exam:   See flowsheet  GEN:  26 year old female sitting comfortably in no apparent distress.   HEENT:  no scleral icterus, buccal mucosa moist  CHEST/LUNG: No respiratory distress, unlabored breathing  ABD:  Soft, non-tender, Gravid uterus  PSYCH:  Mood and affect appropriate    Joseph was seen today for prenatal care.    Diagnoses and all orders for this visit:    Encounter for supervision of other normal pregnancy in second trimester  Comments:  requests Quad screen. We reviewed trace ketones on her urine. Make sure she is eating small frequent meals throughout the day and staying hydrated.   Orders:  -     US OB > 14 Weeks; Future  -     Maternal Quad Marker 2nd Trimester; Future  -     Urinalysis, OB Screen  -     Maternal Quad Marker 2nd Trimester  -     [facility] Referral - Pregnancy; Future    Bacteriuria during pregnancy  Comments:  now with e coli bacteruria. keflex sent.   Orders:  -     Urine Culture Aerobic Bacterial - lab collect; Future  -     Urine Culture Aerobic Bacterial - lab collect  -     cephALEXin (KEFLEX) 500 MG capsule; Take 1 capsule (500 mg) by mouth 4 times daily for 5 days    Environmental allergies  Comments:  needs refill on zyrtec  Orders:  -     cetirizine (ZYRTEC) 10 MG tablet; Take 1 tablet (10 mg) by mouth daily    Hx of  section complicating pregnancy  Comments:  Told with first c/s should not  due to extended incision. Plan for referral to Dr Potter in 3rd trimester for repeat c/s consult    History of congenital heart defect  Comments:  Hx of CHD with repair at age 4 per patient report. Saw cardiology for consult 1st pregnancy. normal echo and Ekg. no further follow up was indicated. No s/s of CHF. Can try to get records again from [place], [place] - multiple attempts made previously. Reviewed 3-4% of fetus in women with hx of CHD can also have CHD and offered fetal echo.  Patient wishes to do this pregnancy.   Orders:  -     [facility] Referral - Pregnancy; Future    Patient has hx of oligohydramnios in her first two pregnancies. 1st at 41 weeks and 2nd at 38 weeks. No clear cause. The interesting thing was I got a growth US in her 2nd due to measuring large and then was oligo. Talking with Dr Potter after that delivery no clear answer as to why this has happened twice. Will discuss possibly starting weekly BPPs at 36 weeks this pregnancy. Patient felt she may have been leaking fluid with the first one.

## 2022-01-12 LAB — BACTERIA UR CULT: ABNORMAL

## 2022-01-13 RX ORDER — CEPHALEXIN 500 MG/1
500 CAPSULE ORAL 4 TIMES DAILY
Qty: 20 CAPSULE | Refills: 0 | Status: SHIPPED | OUTPATIENT
Start: 2022-01-13 | End: 2022-01-18

## 2022-01-21 ENCOUNTER — HOSPITAL ENCOUNTER (OUTPATIENT)
Dept: ULTRASOUND IMAGING | Facility: HOSPITAL | Age: 27
Discharge: HOME | End: 2022-01-21
Attending: FAMILY MEDICINE | Admitting: FAMILY MEDICINE
Payer: COMMERCIAL

## 2022-01-21 ENCOUNTER — MYC MEDICAL ADVICE (OUTPATIENT)
Dept: FAMILY MEDICINE | Facility: CLINIC | Age: 27
End: 2022-01-21
Payer: COMMERCIAL

## 2022-01-21 DIAGNOSIS — Z34.82 ENCOUNTER FOR SUPERVISION OF OTHER NORMAL PREGNANCY IN SECOND TRIMESTER: ICD-10-CM

## 2022-01-21 PROCEDURE — 76805 OB US >/= 14 WKS SNGL FETUS: CPT

## 2022-01-25 ENCOUNTER — TRANSCRIBE ORDERS (OUTPATIENT)
Dept: MATERNAL FETAL MEDICINE | Facility: HOSPITAL | Age: 27
End: 2022-01-25
Payer: COMMERCIAL

## 2022-01-25 ENCOUNTER — PRE VISIT (OUTPATIENT)
Dept: MATERNAL FETAL MEDICINE | Facility: CLINIC | Age: 27
End: 2022-01-25
Payer: COMMERCIAL

## 2022-01-25 ENCOUNTER — OFFICE VISIT (OUTPATIENT)
Dept: MATERNAL FETAL MEDICINE | Facility: CLINIC | Age: 27
End: 2022-01-25
Attending: FAMILY MEDICINE
Payer: COMMERCIAL

## 2022-01-25 ENCOUNTER — HOSPITAL ENCOUNTER (OUTPATIENT)
Dept: ULTRASOUND IMAGING | Facility: CLINIC | Age: 27
End: 2022-01-25
Attending: FAMILY MEDICINE
Payer: COMMERCIAL

## 2022-01-25 DIAGNOSIS — Z87.74 HX OF CONGENITAL HEART DISEASE: Primary | ICD-10-CM

## 2022-01-25 DIAGNOSIS — O26.90 PREGNANCY RELATED CONDITION, ANTEPARTUM: Primary | ICD-10-CM

## 2022-01-25 DIAGNOSIS — O26.90 PREGNANCY RELATED CONDITION, ANTEPARTUM: ICD-10-CM

## 2022-01-25 PROCEDURE — 76811 OB US DETAILED SNGL FETUS: CPT

## 2022-01-25 PROCEDURE — 76811 OB US DETAILED SNGL FETUS: CPT | Mod: 26 | Performed by: OBSTETRICS & GYNECOLOGY

## 2022-01-26 ENCOUNTER — TELEPHONE (OUTPATIENT)
Dept: FAMILY MEDICINE | Facility: CLINIC | Age: 27
End: 2022-01-26
Payer: COMMERCIAL

## 2022-01-26 DIAGNOSIS — Z87.74 HISTORY OF CONGENITAL HEART DEFECT: Primary | ICD-10-CM

## 2022-01-26 NOTE — TELEPHONE ENCOUNTER
Lvm on pt ph#[phone] to contact the [facility] at ph#[phone] as procedure is done there and not done nor schedule at our [facility]. Completing task.

## 2022-01-26 NOTE — PROGRESS NOTES
"Please see \"Imaging\" tab under \"Chart Review\" for details of today's US.    Edyta Humphrey, DO    "

## 2022-01-26 NOTE — TELEPHONE ENCOUNTER
Reason for Call:  Other call back    Detailed comments: PT IS WONDERING WHY HER ECHO FETAL COMPLETE ON 3/11 WAS CANCELLED, SHE DID NOT RECEIVE ANY EXPLANATION FOR WHY. WOULD LIKE CALL BACK    Phone Number Patient can be reached at: Home number on file [phone] (home)    Best Time: ANYTIME AFTER 3:30    Can we leave a detailed message on this number? YES    Call taken on 1/26/2022 at 12:11 PM by Fany Garduno

## 2022-01-30 ENCOUNTER — HEALTH MAINTENANCE LETTER (OUTPATIENT)
Age: 27
End: 2022-01-30

## 2022-02-07 ENCOUNTER — PRENATAL OFFICE VISIT (OUTPATIENT)
Dept: FAMILY MEDICINE | Facility: CLINIC | Age: 27
End: 2022-02-07
Payer: COMMERCIAL

## 2022-02-07 VITALS
DIASTOLIC BLOOD PRESSURE: 74 MMHG | WEIGHT: 205.1 LBS | SYSTOLIC BLOOD PRESSURE: 117 MMHG | HEART RATE: 87 BPM | BODY MASS INDEX: 37.5 KG/M2

## 2022-02-07 DIAGNOSIS — O99.891 BACTERIURIA DURING PREGNANCY: ICD-10-CM

## 2022-02-07 DIAGNOSIS — Z34.82 ENCOUNTER FOR SUPERVISION OF OTHER NORMAL PREGNANCY IN SECOND TRIMESTER: Primary | ICD-10-CM

## 2022-02-07 DIAGNOSIS — O34.219 HX OF CESAREAN SECTION COMPLICATING PREGNANCY: ICD-10-CM

## 2022-02-07 DIAGNOSIS — Z87.74 HISTORY OF CONGENITAL HEART DEFECT: ICD-10-CM

## 2022-02-07 DIAGNOSIS — R82.71 BACTERIURIA DURING PREGNANCY: ICD-10-CM

## 2022-02-07 LAB
ALBUMIN UR-MCNC: NEGATIVE MG/DL
GLUCOSE UR STRIP-MCNC: NEGATIVE MG/DL
KETONES UR STRIP-MCNC: NEGATIVE MG/DL

## 2022-02-07 PROCEDURE — 81003 URINALYSIS AUTO W/O SCOPE: CPT | Performed by: FAMILY MEDICINE

## 2022-02-07 PROCEDURE — 99207 PR PRENATAL VISIT: CPT | Performed by: FAMILY MEDICINE

## 2022-02-07 PROCEDURE — 87086 URINE CULTURE/COLONY COUNT: CPT | Performed by: FAMILY MEDICINE

## 2022-02-08 LAB — BACTERIA UR CULT: NO GROWTH

## 2022-02-08 NOTE — PROGRESS NOTES
No ctx, lof, bleeding, or discharge.  No HA or vision changes.  Good FM : yes    Labs/imaging reviewed: Patient had normal level 2 ultrasound.  Getting echo.  It is a girl!!!    Exam:   See flowsheet  GEN:  26 year old female sitting comfortably in no apparent distress.   HEENT:  no scleral icterus, buccal mucosa moist  CHEST/LUNG: No respiratory distress, unlabored breathing  ABD:  Soft, non-tender, Gravid uterus  PSYCH:  Mood and affect appropriate     Joseph was seen today for prenatal care.    Diagnoses and all orders for this visit:     Encounter for supervision of other normal pregnancy in second trimester    Bacteriuria during pregnancy: Status post treatment.  Repeat urine culture today  -     Urine Culture Aerobic Bacterial - lab collect; Future  -     Urinalysis, OB Screen  -     Urine Culture Aerobic Bacterial - lab collect    Hx of  section complicating pregnancy:    History of congenital heart defect  Comments:  Planning for fetal echo later this month.  Patient herself needs no further follow-up.  Saw cards her first pregnancy.    Discussed 1 hour GTT and other labs next visit.

## 2022-02-28 ENCOUNTER — OFFICE VISIT (OUTPATIENT)
Dept: MATERNAL FETAL MEDICINE | Facility: HOSPITAL | Age: 27
End: 2022-02-28
Attending: OBSTETRICS & GYNECOLOGY
Payer: COMMERCIAL

## 2022-02-28 ENCOUNTER — ANCILLARY PROCEDURE (OUTPATIENT)
Dept: ULTRASOUND IMAGING | Facility: HOSPITAL | Age: 27
End: 2022-02-28
Attending: OBSTETRICS & GYNECOLOGY
Payer: COMMERCIAL

## 2022-02-28 DIAGNOSIS — Z87.74 HISTORY OF CONGENITAL HEART DEFECT: ICD-10-CM

## 2022-02-28 DIAGNOSIS — Q24.9 MATERNAL CONGENITAL HEART DISEASE IN SECOND TRIMESTER, ANTEPARTUM: Primary | ICD-10-CM

## 2022-02-28 DIAGNOSIS — O99.412 MATERNAL CONGENITAL HEART DISEASE IN SECOND TRIMESTER, ANTEPARTUM: Primary | ICD-10-CM

## 2022-02-28 DIAGNOSIS — O09.299 HISTORY OF OLIGOHYDRAMNIOS IN PRIOR PREGNANCY, CURRENTLY PREGNANT: ICD-10-CM

## 2022-02-28 PROCEDURE — 93325 DOPPLER ECHO COLOR FLOW MAPG: CPT

## 2022-02-28 PROCEDURE — 76825 ECHO EXAM OF FETAL HEART: CPT | Mod: 26 | Performed by: OBSTETRICS & GYNECOLOGY

## 2022-02-28 PROCEDURE — 76827 ECHO EXAM OF FETAL HEART: CPT | Mod: 26 | Performed by: OBSTETRICS & GYNECOLOGY

## 2022-02-28 PROCEDURE — 93325 DOPPLER ECHO COLOR FLOW MAPG: CPT | Mod: 26 | Performed by: OBSTETRICS & GYNECOLOGY

## 2022-02-28 PROCEDURE — 99207 PR NO CHARGE LOS: CPT | Performed by: OBSTETRICS & GYNECOLOGY

## 2022-02-28 NOTE — PROGRESS NOTES
"Please see \"Imaging\" tab under \"Chart Review\" for details of today's visit.    Latanya Galo    "

## 2022-03-07 ENCOUNTER — PRENATAL OFFICE VISIT (OUTPATIENT)
Dept: FAMILY MEDICINE | Facility: CLINIC | Age: 27
End: 2022-03-07
Payer: COMMERCIAL

## 2022-03-07 VITALS
TEMPERATURE: 98.2 F | HEART RATE: 72 BPM | HEIGHT: 62 IN | BODY MASS INDEX: 37.52 KG/M2 | DIASTOLIC BLOOD PRESSURE: 69 MMHG | SYSTOLIC BLOOD PRESSURE: 103 MMHG | WEIGHT: 203.9 LBS | RESPIRATION RATE: 16 BRPM

## 2022-03-07 DIAGNOSIS — O09.292 HISTORY OF OLIGOHYDRAMNIOS IN PRIOR PREGNANCY, CURRENTLY PREGNANT IN SECOND TRIMESTER: ICD-10-CM

## 2022-03-07 DIAGNOSIS — O09.629 ENCOUNTER FOR SUPERVISION OF HIGH-RISK PREGNANCY OF YOUNG MULTIGRAVIDA: Primary | ICD-10-CM

## 2022-03-07 DIAGNOSIS — Z87.74 HISTORY OF CONGENITAL HEART DEFECT: ICD-10-CM

## 2022-03-07 DIAGNOSIS — O34.219 HX OF CESAREAN SECTION COMPLICATING PREGNANCY: ICD-10-CM

## 2022-03-07 LAB
ALBUMIN UR-MCNC: NEGATIVE MG/DL
GLUCOSE 1H P 50 G GLC PO SERPL-MCNC: 104 MG/DL (ref 70–129)
GLUCOSE UR STRIP-MCNC: NEGATIVE MG/DL
HGB BLD-MCNC: 12 G/DL (ref 11.7–15.7)
KETONES UR STRIP-MCNC: ABNORMAL MG/DL

## 2022-03-07 PROCEDURE — 99207 PR PRENATAL VISIT: CPT | Performed by: FAMILY MEDICINE

## 2022-03-07 PROCEDURE — 81003 URINALYSIS AUTO W/O SCOPE: CPT | Performed by: FAMILY MEDICINE

## 2022-03-07 PROCEDURE — 36415 COLL VENOUS BLD VENIPUNCTURE: CPT | Performed by: FAMILY MEDICINE

## 2022-03-07 PROCEDURE — 86780 TREPONEMA PALLIDUM: CPT | Performed by: FAMILY MEDICINE

## 2022-03-07 PROCEDURE — 82950 GLUCOSE TEST: CPT | Performed by: FAMILY MEDICINE

## 2022-03-07 NOTE — PROGRESS NOTES
No ctx, lof, bleeding, or discharge.  No HA or vision changes.  Good FM : yes    Labs/imaging reviewed: normal fetal echo    Exam:   See flowsheet  GEN:  26 year old female sitting comfortably in no apparent distress.   HEENT:  no scleral icterus, buccal mucosa moist  CHEST/LUNG: No respiratory distress, unlabored breathing  ABD:  Soft, non-tender, Gravid uterus  PSYCH:  Mood and affect appropriate    Joseph was seen today for prenatal care.    Diagnoses and all orders for this visit:    Encounter for supervision of high-risk pregnancy of young multigravida  -     REVIEW OF HEALTH MAINTENANCE PROTOCOL ORDERS  -     Glucose tolerance, gest screen, 1 hour; Future  -     OB hemoglobin; Future  -     Treponema Abs w Reflex to RPR and Titer; Future  -     Urinalysis, OB Screen    History of oligohydramnios in prior pregnancy, currently pregnant in second trimester: MFM recommending growth US at 30 and 36 weeks and weekly BPPs starting at 36 weeks    Hx of  section complicating pregnancy: will refer to Dr Potter at next visit for repeat C/s    History of congenital heart defect: normal fetal echo. Attempts being made to track down her records again.

## 2022-03-08 PROBLEM — O09.292 HISTORY OF OLIGOHYDRAMNIOS IN PRIOR PREGNANCY, CURRENTLY PREGNANT IN SECOND TRIMESTER: Status: ACTIVE | Noted: 2022-03-08

## 2022-03-08 LAB — T PALLIDUM AB SER QL: NONREACTIVE

## 2022-04-04 ENCOUNTER — OFFICE VISIT (OUTPATIENT)
Dept: MATERNAL FETAL MEDICINE | Facility: HOSPITAL | Age: 27
End: 2022-04-04
Attending: OBSTETRICS & GYNECOLOGY
Payer: COMMERCIAL

## 2022-04-04 ENCOUNTER — ANCILLARY PROCEDURE (OUTPATIENT)
Dept: ULTRASOUND IMAGING | Facility: HOSPITAL | Age: 27
End: 2022-04-04
Attending: OBSTETRICS & GYNECOLOGY
Payer: COMMERCIAL

## 2022-04-04 DIAGNOSIS — O09.299 HISTORY OF OLIGOHYDRAMNIOS IN PRIOR PREGNANCY, CURRENTLY PREGNANT: ICD-10-CM

## 2022-04-04 DIAGNOSIS — O09.299 HISTORY OF OLIGOHYDRAMNIOS IN PRIOR PREGNANCY, CURRENTLY PREGNANT: Primary | ICD-10-CM

## 2022-04-04 PROCEDURE — 76816 OB US FOLLOW-UP PER FETUS: CPT | Mod: 26 | Performed by: OBSTETRICS & GYNECOLOGY

## 2022-04-04 PROCEDURE — 76816 OB US FOLLOW-UP PER FETUS: CPT

## 2022-04-04 PROCEDURE — 99207 PR NO CHARGE LOS: CPT | Performed by: OBSTETRICS & GYNECOLOGY

## 2022-04-04 NOTE — PROGRESS NOTES
"Please see \"Imaging\" tab under Chart Review for full details.    Chica Ross MD  Maternal Fetal Medicine    "

## 2022-04-05 ENCOUNTER — PRENATAL OFFICE VISIT (OUTPATIENT)
Dept: FAMILY MEDICINE | Facility: CLINIC | Age: 27
End: 2022-04-05
Payer: COMMERCIAL

## 2022-04-05 VITALS
DIASTOLIC BLOOD PRESSURE: 73 MMHG | WEIGHT: 206 LBS | HEART RATE: 83 BPM | SYSTOLIC BLOOD PRESSURE: 115 MMHG | TEMPERATURE: 97.5 F | RESPIRATION RATE: 18 BRPM | BODY MASS INDEX: 37.43 KG/M2

## 2022-04-05 DIAGNOSIS — Z87.74 HISTORY OF CONGENITAL HEART DEFECT: ICD-10-CM

## 2022-04-05 DIAGNOSIS — Z34.83 ENCOUNTER FOR SUPERVISION OF OTHER NORMAL PREGNANCY IN THIRD TRIMESTER: Primary | ICD-10-CM

## 2022-04-05 DIAGNOSIS — O09.292 HISTORY OF OLIGOHYDRAMNIOS IN PRIOR PREGNANCY, CURRENTLY PREGNANT IN SECOND TRIMESTER: ICD-10-CM

## 2022-04-05 DIAGNOSIS — R82.71 BACTERIURIA DURING PREGNANCY: ICD-10-CM

## 2022-04-05 DIAGNOSIS — O34.219 HX OF CESAREAN SECTION COMPLICATING PREGNANCY: ICD-10-CM

## 2022-04-05 DIAGNOSIS — O99.891 BACTERIURIA DURING PREGNANCY: ICD-10-CM

## 2022-04-05 PROCEDURE — 87086 URINE CULTURE/COLONY COUNT: CPT | Performed by: FAMILY MEDICINE

## 2022-04-05 PROCEDURE — 81003 URINALYSIS AUTO W/O SCOPE: CPT | Performed by: FAMILY MEDICINE

## 2022-04-05 PROCEDURE — 99207 PR PRENATAL VISIT: CPT | Performed by: FAMILY MEDICINE

## 2022-04-05 PROCEDURE — 90471 IMMUNIZATION ADMIN: CPT | Performed by: FAMILY MEDICINE

## 2022-04-05 PROCEDURE — 90715 TDAP VACCINE 7 YRS/> IM: CPT | Performed by: FAMILY MEDICINE

## 2022-04-05 NOTE — PROGRESS NOTES
No ctx, lof, bleeding, or discharge.  No HA or vision changes.  Good FM : yes  Reviewed her trace ketones- she only eats one large meal at night usually. Doesn't feel hungry during the day. We reviewed this is likely the cause. Encouraged her to eat smaller frequent meals throughout the day. High protein snacks.     Exam:   See flowsheet  GEN:  27 year old female sitting comfortably in no apparent distress.   HEENT:  no scleral icterus, buccal mucosa moist  CHEST/LUNG: No respiratory distress, unlabored breathing  ABD:  Soft, non-tender, Gravid uterus  PSYCH:  Mood and affect appropriate    Joseph was seen today for prenatal care.    Diagnoses and all orders for this visit:    Encounter for supervision of other normal pregnancy in third trimester  -     Urinalysis, OB Screen; Future  -     TDAP VACCINE (Adacel, Boostrix)  [[id number]]  -     Urine Culture Aerobic Bacterial - lab collect; Future  -     Urinalysis, OB Screen    History of congenital heart defect  Comments:  normal fetal echo    History of oligohydramnios in prior pregnancy, currently pregnant in second trimester  Comments:  growth yesterday reasurring. will start BPPS at 36 weeks.     Hx of  section complicating pregnancy  Comments:  referr to [facility] for repeat c/s  Orders:  -     Ob/Gyn Referral; Future    Bacteriuria during pregnancy  -     Urine Culture Aerobic Bacterial - lab collect

## 2022-04-07 LAB — BACTERIA UR CULT: NORMAL

## 2022-04-19 ENCOUNTER — PRENATAL OFFICE VISIT (OUTPATIENT)
Dept: FAMILY MEDICINE | Facility: CLINIC | Age: 27
End: 2022-04-19
Payer: COMMERCIAL

## 2022-04-19 VITALS
TEMPERATURE: 97.7 F | RESPIRATION RATE: 20 BRPM | BODY MASS INDEX: 37.79 KG/M2 | HEART RATE: 90 BPM | DIASTOLIC BLOOD PRESSURE: 72 MMHG | SYSTOLIC BLOOD PRESSURE: 109 MMHG | WEIGHT: 208 LBS

## 2022-04-19 DIAGNOSIS — O09.629 ENCOUNTER FOR SUPERVISION OF HIGH-RISK PREGNANCY OF YOUNG MULTIGRAVIDA: Primary | ICD-10-CM

## 2022-04-19 DIAGNOSIS — R82.71 BACTERIURIA DURING PREGNANCY: ICD-10-CM

## 2022-04-19 DIAGNOSIS — O09.292 HISTORY OF OLIGOHYDRAMNIOS IN PRIOR PREGNANCY, CURRENTLY PREGNANT IN SECOND TRIMESTER: ICD-10-CM

## 2022-04-19 DIAGNOSIS — O99.891 BACTERIURIA DURING PREGNANCY: ICD-10-CM

## 2022-04-19 DIAGNOSIS — O34.219 HX OF CESAREAN SECTION COMPLICATING PREGNANCY: ICD-10-CM

## 2022-04-19 PROCEDURE — 81003 URINALYSIS AUTO W/O SCOPE: CPT | Performed by: FAMILY MEDICINE

## 2022-04-19 PROCEDURE — 99207 PR PRENATAL VISIT: CPT | Performed by: FAMILY MEDICINE

## 2022-04-19 NOTE — PROGRESS NOTES
No ctx, lof, bleeding, or discharge.  No HA or vision changes.  Good FM : yes  appt with Dr Potter next week Tuesday.   Sometimes when she coughs/sneezes leaks a little urine but no LOF noted yet.    Exam:   See flowsheet  GEN:  27 year old female sitting comfortably in no apparent distress.   HEENT:  no scleral icterus, buccal mucosa moist  CHEST/LUNG: No respiratory distress, unlabored breathing  ABD:  Soft, non-tender, Gravid uterus  PSYCH:  Mood and affect appropriate    Joseph was seen today for prenatal care.    Diagnoses and all orders for this visit:    Encounter for supervision of high-risk pregnancy of young multigravida  -     Urinalysis, OB Screen; Future  -     Urinalysis, OB Screen    Hx of  section complicating pregnancy: Has appointment Dr. Potter scheduled next week to schedule .    History of oligohydramnios in prior pregnancy, currently pregnant in second trimester: No symptoms yet of any loss of fluid.  Plan for BPP starting at 36 weeks and growth ultrasound    Bacteriuria during pregnancy: Negative test of cure x2      Postpartum birth control: She would like to do patches.  Declines tubal ligation  Hoping she is going to be able to breast-feed more with this 1 due to the formula shortage.

## 2022-05-03 ENCOUNTER — PRENATAL OFFICE VISIT (OUTPATIENT)
Dept: FAMILY MEDICINE | Facility: CLINIC | Age: 27
End: 2022-05-03
Payer: COMMERCIAL

## 2022-05-03 VITALS
BODY MASS INDEX: 37.43 KG/M2 | HEART RATE: 86 BPM | WEIGHT: 206 LBS | SYSTOLIC BLOOD PRESSURE: 112 MMHG | TEMPERATURE: 97.9 F | DIASTOLIC BLOOD PRESSURE: 73 MMHG | RESPIRATION RATE: 18 BRPM

## 2022-05-03 DIAGNOSIS — O09.293 HISTORY OF OLIGOHYDRAMNIOS IN PRIOR PREGNANCY, CURRENTLY PREGNANT, THIRD TRIMESTER: ICD-10-CM

## 2022-05-03 DIAGNOSIS — R82.71 BACTERIURIA DURING PREGNANCY: ICD-10-CM

## 2022-05-03 DIAGNOSIS — Z87.74 HISTORY OF CONGENITAL HEART DEFECT: ICD-10-CM

## 2022-05-03 DIAGNOSIS — O34.219 HX OF CESAREAN SECTION COMPLICATING PREGNANCY: ICD-10-CM

## 2022-05-03 DIAGNOSIS — O09.629 ENCOUNTER FOR SUPERVISION OF HIGH-RISK PREGNANCY OF YOUNG MULTIGRAVIDA: Primary | ICD-10-CM

## 2022-05-03 DIAGNOSIS — O99.891 BACTERIURIA DURING PREGNANCY: ICD-10-CM

## 2022-05-03 PROCEDURE — 99207 PR PRENATAL VISIT: CPT | Performed by: FAMILY MEDICINE

## 2022-05-03 NOTE — PROGRESS NOTES
No ctx, lof, bleeding, or discharge.  No HA or vision changes.  Good FM : yes  Good questions about c/s prep and nerve block.  Towards the end can't sleep much.   C/s scheduled for     Exam:   See flowsheet  GEN:  27 year old female sitting comfortably in no apparent distress.   HEENT:  no scleral icterus, buccal mucosa moist  CHEST/LUNG: No respiratory distress, unlabored breathing  ABD:  Soft, non-tender, Gravid uterus  PSYCH:  Mood and affect appropriate    Joseph was seen today for prenatal care.    Diagnoses and all orders for this visit:    Encounter for supervision of high-risk pregnancy of young multigravida    Hx of  section complicating pregnancy    History of oligohydramnios in prior pregnancy, currently pregnant, third trimester    Bacteriuria during pregnancy    History of congenital heart defect    Other orders  -     Urinalysis, OB Screen; Future

## 2022-05-16 ENCOUNTER — ANCILLARY PROCEDURE (OUTPATIENT)
Dept: ULTRASOUND IMAGING | Facility: HOSPITAL | Age: 27
End: 2022-05-16
Attending: OBSTETRICS & GYNECOLOGY
Payer: COMMERCIAL

## 2022-05-16 ENCOUNTER — OFFICE VISIT (OUTPATIENT)
Dept: MATERNAL FETAL MEDICINE | Facility: HOSPITAL | Age: 27
End: 2022-05-16
Attending: OBSTETRICS & GYNECOLOGY
Payer: COMMERCIAL

## 2022-05-16 DIAGNOSIS — O09.299 HISTORY OF OLIGOHYDRAMNIOS IN PRIOR PREGNANCY, CURRENTLY PREGNANT: Primary | ICD-10-CM

## 2022-05-16 DIAGNOSIS — O09.299 HISTORY OF OLIGOHYDRAMNIOS IN PRIOR PREGNANCY, CURRENTLY PREGNANT: ICD-10-CM

## 2022-05-16 PROCEDURE — 76816 OB US FOLLOW-UP PER FETUS: CPT

## 2022-05-16 PROCEDURE — 99207 PR NO CHARGE LOS: CPT | Performed by: OBSTETRICS & GYNECOLOGY

## 2022-05-16 PROCEDURE — 76819 FETAL BIOPHYS PROFIL W/O NST: CPT

## 2022-05-16 PROCEDURE — 76819 FETAL BIOPHYS PROFIL W/O NST: CPT | Mod: 26 | Performed by: OBSTETRICS & GYNECOLOGY

## 2022-05-16 PROCEDURE — 76816 OB US FOLLOW-UP PER FETUS: CPT | Mod: 26 | Performed by: OBSTETRICS & GYNECOLOGY

## 2022-05-17 ENCOUNTER — PRENATAL OFFICE VISIT (OUTPATIENT)
Dept: FAMILY MEDICINE | Facility: CLINIC | Age: 27
End: 2022-05-17
Payer: COMMERCIAL

## 2022-05-17 VITALS
HEART RATE: 93 BPM | DIASTOLIC BLOOD PRESSURE: 70 MMHG | WEIGHT: 210 LBS | TEMPERATURE: 98.4 F | SYSTOLIC BLOOD PRESSURE: 108 MMHG | BODY MASS INDEX: 38.16 KG/M2 | RESPIRATION RATE: 18 BRPM

## 2022-05-17 DIAGNOSIS — O09.629 ENCOUNTER FOR SUPERVISION OF HIGH-RISK PREGNANCY OF YOUNG MULTIGRAVIDA: Primary | ICD-10-CM

## 2022-05-17 DIAGNOSIS — O34.219 HX OF CESAREAN SECTION COMPLICATING PREGNANCY: ICD-10-CM

## 2022-05-17 DIAGNOSIS — O09.292 HISTORY OF OLIGOHYDRAMNIOS IN PRIOR PREGNANCY, CURRENTLY PREGNANT IN SECOND TRIMESTER: ICD-10-CM

## 2022-05-17 PROCEDURE — 99207 PR PRENATAL VISIT: CPT | Performed by: FAMILY MEDICINE

## 2022-05-17 PROCEDURE — 87653 STREP B DNA AMP PROBE: CPT | Performed by: FAMILY MEDICINE

## 2022-05-17 NOTE — PROGRESS NOTES
No ctx, lof, bleeding, or discharge.  No HA or vision changes.  Good FM : yes  Some cramping.   Growth ultrasound yesterday with MFM was showing baby at the 11th percentile for EFW and AC.    Exam:   See flowsheet  GEN:  27 year old female sitting comfortably in no apparent distress.   HEENT:  no scleral icterus, buccal mucosa moist  CHEST/LUNG: No respiratory distress, unlabored breathing  ABD:  Soft, non-tender, Gravid uterus  PSYCH:  Mood and affect appropriate    Joseph was seen today for prenatal care.    Diagnoses and all orders for this visit:    Encounter for supervision of high-risk pregnancy of young multigravida  -     Group B strep PCR  -     Urinalysis, OB Screen; Future    Hx of  section complicating pregnancy  Comments:  Scheduled  at 1 PM    History of oligohydramnios in prior pregnancy, currently pregnant in second trimester  Comments:  Continue weekly BPP's.  Planning to repeat growth ultrasound in 2 weeks.  If measuring less than 10th percentile we will plan for  by 39w0d.  She continues to measure quite well although she is obese I am getting her at 38 cm today for fundal height.

## 2022-05-18 LAB — GP B STREP DNA SPEC QL NAA+PROBE: POSITIVE

## 2022-05-23 ENCOUNTER — ANCILLARY PROCEDURE (OUTPATIENT)
Dept: ULTRASOUND IMAGING | Facility: HOSPITAL | Age: 27
End: 2022-05-23
Attending: OBSTETRICS & GYNECOLOGY
Payer: COMMERCIAL

## 2022-05-23 ENCOUNTER — OFFICE VISIT (OUTPATIENT)
Dept: MATERNAL FETAL MEDICINE | Facility: HOSPITAL | Age: 27
End: 2022-05-23
Attending: OBSTETRICS & GYNECOLOGY
Payer: COMMERCIAL

## 2022-05-23 DIAGNOSIS — O09.299 HISTORY OF OLIGOHYDRAMNIOS IN PRIOR PREGNANCY, CURRENTLY PREGNANT: ICD-10-CM

## 2022-05-23 DIAGNOSIS — O09.299 HISTORY OF OLIGOHYDRAMNIOS IN PRIOR PREGNANCY, CURRENTLY PREGNANT: Primary | ICD-10-CM

## 2022-05-23 PROCEDURE — 99207 PR NO CHARGE LOS: CPT | Performed by: OBSTETRICS & GYNECOLOGY

## 2022-05-23 PROCEDURE — 76819 FETAL BIOPHYS PROFIL W/O NST: CPT | Mod: 26 | Performed by: OBSTETRICS & GYNECOLOGY

## 2022-05-23 PROCEDURE — 76819 FETAL BIOPHYS PROFIL W/O NST: CPT

## 2022-05-24 ENCOUNTER — PRENATAL OFFICE VISIT (OUTPATIENT)
Dept: FAMILY MEDICINE | Facility: CLINIC | Age: 27
End: 2022-05-24
Payer: COMMERCIAL

## 2022-05-24 VITALS
DIASTOLIC BLOOD PRESSURE: 73 MMHG | HEART RATE: 91 BPM | TEMPERATURE: 98 F | WEIGHT: 210 LBS | BODY MASS INDEX: 38.16 KG/M2 | SYSTOLIC BLOOD PRESSURE: 107 MMHG | RESPIRATION RATE: 18 BRPM

## 2022-05-24 DIAGNOSIS — O34.219 HX OF CESAREAN SECTION COMPLICATING PREGNANCY: ICD-10-CM

## 2022-05-24 DIAGNOSIS — O09.293 HISTORY OF OLIGOHYDRAMNIOS IN PRIOR PREGNANCY, CURRENTLY PREGNANT, THIRD TRIMESTER: ICD-10-CM

## 2022-05-24 DIAGNOSIS — O09.629 ENCOUNTER FOR SUPERVISION OF HIGH-RISK PREGNANCY OF YOUNG MULTIGRAVIDA: Primary | ICD-10-CM

## 2022-05-24 PROCEDURE — 99207 PR PRENATAL VISIT: CPT | Performed by: FAMILY MEDICINE

## 2022-05-24 NOTE — PROGRESS NOTES
No ctx, lof, bleeding, or discharge.  No HA or vision changes.  Good FM : yes  Some contractions last week. Went away.   Not consistent.     Exam:   See flowsheet  GEN:  27 year old female sitting comfortably in no apparent distress.   HEENT:  no scleral icterus, buccal mucosa moist  CHEST/LUNG: No respiratory distress, unlabored breathing  ABD:  Soft, non-tender, Gravid uterus  PSYCH:  Mood and affect appropriate    Joseph was seen today for prenatal care.    Diagnoses and all orders for this visit:    Encounter for supervision of high-risk pregnancy of young multigravida    Hx of  section complicating pregnancy    History of oligohydramnios in prior pregnancy, currently pregnant, third trimester    BPP yesterday . Has growth and BPP scheduled next week  Reviewed kick counts/fetal movement  Reviewed s/s of labor and when to go to L&D.   C/S scheduled .

## 2022-05-29 DIAGNOSIS — Z11.59 ENCOUNTER FOR SCREENING FOR OTHER VIRAL DISEASES: Primary | ICD-10-CM

## 2022-05-31 ENCOUNTER — PRENATAL OFFICE VISIT (OUTPATIENT)
Dept: FAMILY MEDICINE | Facility: CLINIC | Age: 27
End: 2022-05-31
Payer: COMMERCIAL

## 2022-05-31 ENCOUNTER — OFFICE VISIT (OUTPATIENT)
Dept: MATERNAL FETAL MEDICINE | Facility: HOSPITAL | Age: 27
End: 2022-05-31
Attending: OBSTETRICS & GYNECOLOGY
Payer: COMMERCIAL

## 2022-05-31 ENCOUNTER — ANCILLARY PROCEDURE (OUTPATIENT)
Dept: ULTRASOUND IMAGING | Facility: HOSPITAL | Age: 27
End: 2022-05-31
Attending: OBSTETRICS & GYNECOLOGY
Payer: COMMERCIAL

## 2022-05-31 VITALS
WEIGHT: 211 LBS | BODY MASS INDEX: 38.34 KG/M2 | DIASTOLIC BLOOD PRESSURE: 71 MMHG | TEMPERATURE: 97.9 F | SYSTOLIC BLOOD PRESSURE: 108 MMHG | RESPIRATION RATE: 20 BRPM | HEART RATE: 87 BPM

## 2022-05-31 DIAGNOSIS — O34.219 HX OF CESAREAN SECTION COMPLICATING PREGNANCY: ICD-10-CM

## 2022-05-31 DIAGNOSIS — O09.629 ENCOUNTER FOR SUPERVISION OF HIGH-RISK PREGNANCY OF YOUNG MULTIGRAVIDA: Primary | ICD-10-CM

## 2022-05-31 DIAGNOSIS — O09.299 HISTORY OF OLIGOHYDRAMNIOS IN PRIOR PREGNANCY, CURRENTLY PREGNANT: ICD-10-CM

## 2022-05-31 DIAGNOSIS — O09.299 HISTORY OF OLIGOHYDRAMNIOS IN PRIOR PREGNANCY, CURRENTLY PREGNANT: Primary | ICD-10-CM

## 2022-05-31 DIAGNOSIS — O09.293 HISTORY OF OLIGOHYDRAMNIOS IN PRIOR PREGNANCY, CURRENTLY PREGNANT, THIRD TRIMESTER: ICD-10-CM

## 2022-05-31 PROCEDURE — 99207 PR NO CHARGE LOS: CPT | Performed by: OBSTETRICS & GYNECOLOGY

## 2022-05-31 PROCEDURE — 76819 FETAL BIOPHYS PROFIL W/O NST: CPT | Mod: 26 | Performed by: OBSTETRICS & GYNECOLOGY

## 2022-05-31 PROCEDURE — 76819 FETAL BIOPHYS PROFIL W/O NST: CPT

## 2022-05-31 PROCEDURE — 59426 ANTEPARTUM CARE ONLY: CPT | Performed by: FAMILY MEDICINE

## 2022-05-31 NOTE — PROGRESS NOTES
No lof, bleeding, or discharge.  No HA or vision changes.  Good FM : yes  Some mild contractons.   No discharge   Ready to be done  Wondering about covid swab    Exam:   See flowsheet  GEN:  27 year old female sitting comfortably in no apparent distress.   HEENT:  no scleral icterus, buccal mucosa moist  CHEST/LUNG: No respiratory distress, unlabored breathing  ABD:  Soft, non-tender, Gravid uterus  PSYCH:  Mood and affect appropriate    Joseph was seen today for prenatal care.    Diagnoses and all orders for this visit:    Encounter for supervision of high-risk pregnancy of young multigravida  -     Urinalysis, OB Screen; Future    Hx of  section complicating pregnancy    History of oligohydramnios in prior pregnancy, currently pregnant, third trimester       scheduled next week .  Should receive call to schedule COVID test.  If not she should message me by Friday.  BPP today 8 out of 8.  Anticipate repeat  next Tuesday.  No need to follow-up with me next week we will just plan to see her at .  Has ultrasound with KE .

## 2022-06-03 ENCOUNTER — LAB (OUTPATIENT)
Dept: FAMILY MEDICINE | Facility: CLINIC | Age: 27
End: 2022-06-03
Attending: OBSTETRICS & GYNECOLOGY
Payer: COMMERCIAL

## 2022-06-03 DIAGNOSIS — Z11.59 ENCOUNTER FOR SCREENING FOR OTHER VIRAL DISEASES: ICD-10-CM

## 2022-06-03 PROCEDURE — U0005 INFEC AGEN DETEC AMPLI PROBE: HCPCS

## 2022-06-03 PROCEDURE — U0003 INFECTIOUS AGENT DETECTION BY NUCLEIC ACID (DNA OR RNA); SEVERE ACUTE RESPIRATORY SYNDROME CORONAVIRUS 2 (SARS-COV-2) (CORONAVIRUS DISEASE [COVID-19]), AMPLIFIED PROBE TECHNIQUE, MAKING USE OF HIGH THROUGHPUT TECHNOLOGIES AS DESCRIBED BY CMS-2020-01-R: HCPCS

## 2022-06-04 LAB — SARS-COV-2 RNA RESP QL NAA+PROBE: NEGATIVE

## 2022-06-06 ENCOUNTER — OFFICE VISIT (OUTPATIENT)
Dept: MATERNAL FETAL MEDICINE | Facility: HOSPITAL | Age: 27
End: 2022-06-06
Attending: OBSTETRICS & GYNECOLOGY
Payer: COMMERCIAL

## 2022-06-06 ENCOUNTER — ANCILLARY PROCEDURE (OUTPATIENT)
Dept: ULTRASOUND IMAGING | Facility: HOSPITAL | Age: 27
End: 2022-06-06
Attending: OBSTETRICS & GYNECOLOGY
Payer: COMMERCIAL

## 2022-06-06 DIAGNOSIS — O09.299 HISTORY OF OLIGOHYDRAMNIOS IN PRIOR PREGNANCY, CURRENTLY PREGNANT: ICD-10-CM

## 2022-06-06 DIAGNOSIS — O26.90 PREGNANCY RELATED CONDITION, ANTEPARTUM: Primary | ICD-10-CM

## 2022-06-06 PROCEDURE — 59025 FETAL NON-STRESS TEST: CPT

## 2022-06-06 PROCEDURE — 76816 OB US FOLLOW-UP PER FETUS: CPT

## 2022-06-06 PROCEDURE — 76818 FETAL BIOPHYS PROFILE W/NST: CPT | Mod: 26 | Performed by: OBSTETRICS & GYNECOLOGY

## 2022-06-06 PROCEDURE — 76816 OB US FOLLOW-UP PER FETUS: CPT | Mod: 26 | Performed by: OBSTETRICS & GYNECOLOGY

## 2022-06-06 PROCEDURE — 99207 PR NO CHARGE LOS: CPT | Performed by: OBSTETRICS & GYNECOLOGY

## 2022-06-06 NOTE — PROGRESS NOTES
NST Performed due to no fetal breathing on BPP.  Dr. Ross reviewed efm tracing. See NST/BPP Doc Flowsheet tab.

## 2022-06-07 ENCOUNTER — ANESTHESIA EVENT (OUTPATIENT)
Dept: OBGYN | Facility: HOSPITAL | Age: 27
End: 2022-06-07
Payer: COMMERCIAL

## 2022-06-07 ENCOUNTER — ANESTHESIA (OUTPATIENT)
Dept: OBGYN | Facility: HOSPITAL | Age: 27
End: 2022-06-07
Payer: COMMERCIAL

## 2022-06-07 ENCOUNTER — HOSPITAL ENCOUNTER (INPATIENT)
Facility: HOSPITAL | Age: 27
LOS: 2 days | Discharge: HOME | End: 2022-06-09
Attending: OBSTETRICS & GYNECOLOGY | Admitting: OBSTETRICS & GYNECOLOGY
Payer: COMMERCIAL

## 2022-06-07 DIAGNOSIS — Z98.891 S/P REPEAT LOW TRANSVERSE C-SECTION: Primary | ICD-10-CM

## 2022-06-07 PROBLEM — O13.9 PIH (PREGNANCY INDUCED HYPERTENSION): Status: ACTIVE | Noted: 2022-06-07

## 2022-06-07 LAB
ABO/RH(D): NORMAL
ANTIBODY SCREEN: NEGATIVE
HGB BLD-MCNC: 12 G/DL (ref 11.7–15.7)
SPECIMEN EXPIRATION DATE: NORMAL

## 2022-06-07 PROCEDURE — 370N000017 HC ANESTHESIA TECHNICAL FEE, PER MIN: Performed by: OBSTETRICS & GYNECOLOGY

## 2022-06-07 PROCEDURE — 36415 COLL VENOUS BLD VENIPUNCTURE: CPT | Performed by: PHYSICIAN ASSISTANT

## 2022-06-07 PROCEDURE — 120N000001 HC R&B MED SURG/OB

## 2022-06-07 PROCEDURE — 250N000009 HC RX 250: Performed by: PHYSICIAN ASSISTANT

## 2022-06-07 PROCEDURE — 360N000076 HC SURGERY LEVEL 3, PER MIN: Performed by: OBSTETRICS & GYNECOLOGY

## 2022-06-07 PROCEDURE — 250N000011 HC RX IP 250 OP 636: Performed by: NURSE ANESTHETIST, CERTIFIED REGISTERED

## 2022-06-07 PROCEDURE — 258N000003 HC RX IP 258 OP 636: Performed by: PHYSICIAN ASSISTANT

## 2022-06-07 PROCEDURE — 250N000011 HC RX IP 250 OP 636: Performed by: ANESTHESIOLOGY

## 2022-06-07 PROCEDURE — 250N000011 HC RX IP 250 OP 636: Performed by: OBSTETRICS & GYNECOLOGY

## 2022-06-07 PROCEDURE — 250N000013 HC RX MED GY IP 250 OP 250 PS 637: Performed by: PHYSICIAN ASSISTANT

## 2022-06-07 PROCEDURE — 250N000013 HC RX MED GY IP 250 OP 250 PS 637: Performed by: OBSTETRICS & GYNECOLOGY

## 2022-06-07 PROCEDURE — 272N000001 HC OR GENERAL SUPPLY STERILE: Performed by: OBSTETRICS & GYNECOLOGY

## 2022-06-07 PROCEDURE — 999N000249 HC STATISTIC C-SECTION ON UNIT

## 2022-06-07 PROCEDURE — 86850 RBC ANTIBODY SCREEN: CPT | Performed by: PHYSICIAN ASSISTANT

## 2022-06-07 PROCEDURE — 85018 HEMOGLOBIN: CPT | Performed by: PHYSICIAN ASSISTANT

## 2022-06-07 PROCEDURE — 258N000003 HC RX IP 258 OP 636: Performed by: NURSE ANESTHETIST, CERTIFIED REGISTERED

## 2022-06-07 PROCEDURE — 250N000011 HC RX IP 250 OP 636: Performed by: PHYSICIAN ASSISTANT

## 2022-06-07 PROCEDURE — 0UN90ZZ RELEASE UTERUS, OPEN APPROACH: ICD-10-PCS | Performed by: OBSTETRICS & GYNECOLOGY

## 2022-06-07 PROCEDURE — 86901 BLOOD TYPING SEROLOGIC RH(D): CPT | Performed by: PHYSICIAN ASSISTANT

## 2022-06-07 PROCEDURE — C9290 INJ, BUPIVACAINE LIPOSOME: HCPCS | Performed by: ANESTHESIOLOGY

## 2022-06-07 PROCEDURE — 86780 TREPONEMA PALLIDUM: CPT | Performed by: PHYSICIAN ASSISTANT

## 2022-06-07 RX ORDER — ONDANSETRON 4 MG/1
4 TABLET, ORALLY DISINTEGRATING ORAL EVERY 6 HOURS PRN
Status: DISCONTINUED | OUTPATIENT
Start: 2022-06-07 | End: 2022-06-07

## 2022-06-07 RX ORDER — CITRIC ACID/SODIUM CITRATE 334-500MG
30 SOLUTION, ORAL ORAL
Status: COMPLETED | OUTPATIENT
Start: 2022-06-07 | End: 2022-06-07

## 2022-06-07 RX ORDER — OXYTOCIN 10 [USP'U]/ML
10 INJECTION, SOLUTION INTRAMUSCULAR; INTRAVENOUS
Status: DISCONTINUED | OUTPATIENT
Start: 2022-06-07 | End: 2022-06-09 | Stop reason: HOSPADM

## 2022-06-07 RX ORDER — HYDROCORTISONE 25 MG/G
CREAM TOPICAL 3 TIMES DAILY PRN
Status: DISCONTINUED | OUTPATIENT
Start: 2022-06-07 | End: 2022-06-09 | Stop reason: HOSPADM

## 2022-06-07 RX ORDER — NALOXONE HYDROCHLORIDE 0.4 MG/ML
0.4 INJECTION, SOLUTION INTRAMUSCULAR; INTRAVENOUS; SUBCUTANEOUS
Status: DISCONTINUED | OUTPATIENT
Start: 2022-06-07 | End: 2022-06-09 | Stop reason: HOSPADM

## 2022-06-07 RX ORDER — LIDOCAINE 40 MG/G
CREAM TOPICAL
Status: DISCONTINUED | OUTPATIENT
Start: 2022-06-07 | End: 2022-06-09 | Stop reason: HOSPADM

## 2022-06-07 RX ORDER — METHYLERGONOVINE MALEATE 0.2 MG/ML
200 INJECTION INTRAVENOUS
Status: DISCONTINUED | OUTPATIENT
Start: 2022-06-07 | End: 2022-06-09 | Stop reason: HOSPADM

## 2022-06-07 RX ORDER — SODIUM CHLORIDE, SODIUM LACTATE, POTASSIUM CHLORIDE, CALCIUM CHLORIDE 600; 310; 30; 20 MG/100ML; MG/100ML; MG/100ML; MG/100ML
INJECTION, SOLUTION INTRAVENOUS CONTINUOUS
Status: DISCONTINUED | OUTPATIENT
Start: 2022-06-07 | End: 2022-06-09 | Stop reason: HOSPADM

## 2022-06-07 RX ORDER — OXYTOCIN/0.9 % SODIUM CHLORIDE 30/500 ML
100-340 PLASTIC BAG, INJECTION (ML) INTRAVENOUS CONTINUOUS PRN
Status: DISCONTINUED | OUTPATIENT
Start: 2022-06-07 | End: 2022-06-09 | Stop reason: HOSPADM

## 2022-06-07 RX ORDER — AMOXICILLIN 250 MG
1 CAPSULE ORAL 2 TIMES DAILY
Status: DISCONTINUED | OUTPATIENT
Start: 2022-06-07 | End: 2022-06-09 | Stop reason: HOSPADM

## 2022-06-07 RX ORDER — MISOPROSTOL 200 UG/1
800 TABLET ORAL
Status: DISCONTINUED | OUTPATIENT
Start: 2022-06-07 | End: 2022-06-09 | Stop reason: HOSPADM

## 2022-06-07 RX ORDER — DEXTROSE, SODIUM CHLORIDE, SODIUM LACTATE, POTASSIUM CHLORIDE, AND CALCIUM CHLORIDE 5; .6; .31; .03; .02 G/100ML; G/100ML; G/100ML; G/100ML; G/100ML
INJECTION, SOLUTION INTRAVENOUS CONTINUOUS
Status: DISCONTINUED | OUTPATIENT
Start: 2022-06-07 | End: 2022-06-09 | Stop reason: HOSPADM

## 2022-06-07 RX ORDER — ONDANSETRON 2 MG/ML
4 INJECTION INTRAMUSCULAR; INTRAVENOUS EVERY 6 HOURS PRN
Status: DISCONTINUED | OUTPATIENT
Start: 2022-06-07 | End: 2022-06-07

## 2022-06-07 RX ORDER — ONDANSETRON 2 MG/ML
4 INJECTION INTRAMUSCULAR; INTRAVENOUS EVERY 6 HOURS PRN
Status: DISCONTINUED | OUTPATIENT
Start: 2022-06-07 | End: 2022-06-09 | Stop reason: HOSPADM

## 2022-06-07 RX ORDER — LORAZEPAM 2 MG/ML
0.25 INJECTION INTRAMUSCULAR
Status: DISCONTINUED | OUTPATIENT
Start: 2022-06-07 | End: 2022-06-09 | Stop reason: HOSPADM

## 2022-06-07 RX ORDER — CARBOPROST TROMETHAMINE 250 UG/ML
250 INJECTION, SOLUTION INTRAMUSCULAR
Status: DISCONTINUED | OUTPATIENT
Start: 2022-06-07 | End: 2022-06-09 | Stop reason: HOSPADM

## 2022-06-07 RX ORDER — CEFAZOLIN SODIUM/WATER 2 G/20 ML
2 SYRINGE (ML) INTRAVENOUS SEE ADMIN INSTRUCTIONS
Status: DISCONTINUED | OUTPATIENT
Start: 2022-06-07 | End: 2022-06-09 | Stop reason: HOSPADM

## 2022-06-07 RX ORDER — EPHEDRINE SULFATE 50 MG/ML
5 INJECTION, SOLUTION INTRAMUSCULAR; INTRAVENOUS; SUBCUTANEOUS
Status: DISCONTINUED | OUTPATIENT
Start: 2022-06-07 | End: 2022-06-09 | Stop reason: HOSPADM

## 2022-06-07 RX ORDER — NALOXONE HYDROCHLORIDE 0.4 MG/ML
0.2 INJECTION, SOLUTION INTRAMUSCULAR; INTRAVENOUS; SUBCUTANEOUS
Status: DISCONTINUED | OUTPATIENT
Start: 2022-06-07 | End: 2022-06-09 | Stop reason: HOSPADM

## 2022-06-07 RX ORDER — CEFAZOLIN SODIUM/WATER 2 G/20 ML
2 SYRINGE (ML) INTRAVENOUS
Status: COMPLETED | OUTPATIENT
Start: 2022-06-07 | End: 2022-06-07

## 2022-06-07 RX ORDER — KETOROLAC TROMETHAMINE 30 MG/ML
15 INJECTION, SOLUTION INTRAMUSCULAR; INTRAVENOUS
Status: DISCONTINUED | OUTPATIENT
Start: 2022-06-07 | End: 2022-06-09 | Stop reason: HOSPADM

## 2022-06-07 RX ORDER — MISOPROSTOL 200 UG/1
400 TABLET ORAL
Status: DISCONTINUED | OUTPATIENT
Start: 2022-06-07 | End: 2022-06-09 | Stop reason: HOSPADM

## 2022-06-07 RX ORDER — AMOXICILLIN 250 MG
2 CAPSULE ORAL 2 TIMES DAILY
Status: DISCONTINUED | OUTPATIENT
Start: 2022-06-07 | End: 2022-06-09 | Stop reason: HOSPADM

## 2022-06-07 RX ORDER — IBUPROFEN 800 MG/1
800 TABLET, FILM COATED ORAL EVERY 6 HOURS
Status: DISCONTINUED | OUTPATIENT
Start: 2022-06-08 | End: 2022-06-09 | Stop reason: HOSPADM

## 2022-06-07 RX ORDER — PROCHLORPERAZINE MALEATE 10 MG
10 TABLET ORAL EVERY 6 HOURS PRN
Status: DISCONTINUED | OUTPATIENT
Start: 2022-06-07 | End: 2022-06-09 | Stop reason: HOSPADM

## 2022-06-07 RX ORDER — ONDANSETRON 2 MG/ML
4 INJECTION INTRAMUSCULAR; INTRAVENOUS EVERY 30 MIN PRN
Status: DISCONTINUED | OUTPATIENT
Start: 2022-06-07 | End: 2022-06-09 | Stop reason: HOSPADM

## 2022-06-07 RX ORDER — MORPHINE SULFATE 1 MG/ML
INJECTION, SOLUTION EPIDURAL; INTRATHECAL; INTRAVENOUS
Status: COMPLETED | OUTPATIENT
Start: 2022-06-07 | End: 2022-06-07

## 2022-06-07 RX ORDER — METOCLOPRAMIDE HYDROCHLORIDE 5 MG/ML
10 INJECTION INTRAMUSCULAR; INTRAVENOUS EVERY 6 HOURS PRN
Status: DISCONTINUED | OUTPATIENT
Start: 2022-06-07 | End: 2022-06-09 | Stop reason: HOSPADM

## 2022-06-07 RX ORDER — OXYTOCIN/0.9 % SODIUM CHLORIDE 30/500 ML
340 PLASTIC BAG, INJECTION (ML) INTRAVENOUS CONTINUOUS PRN
Status: DISCONTINUED | OUTPATIENT
Start: 2022-06-07 | End: 2022-06-09 | Stop reason: HOSPADM

## 2022-06-07 RX ORDER — METOCLOPRAMIDE 10 MG/1
10 TABLET ORAL EVERY 6 HOURS PRN
Status: DISCONTINUED | OUTPATIENT
Start: 2022-06-07 | End: 2022-06-09 | Stop reason: HOSPADM

## 2022-06-07 RX ORDER — OXYTOCIN/0.9 % SODIUM CHLORIDE 30/500 ML
340 PLASTIC BAG, INJECTION (ML) INTRAVENOUS CONTINUOUS PRN
Status: COMPLETED | OUTPATIENT
Start: 2022-06-07 | End: 2022-06-07

## 2022-06-07 RX ORDER — ACETAMINOPHEN 325 MG/1
975 TABLET ORAL ONCE
Status: COMPLETED | OUTPATIENT
Start: 2022-06-07 | End: 2022-06-07

## 2022-06-07 RX ORDER — FENTANYL CITRATE 50 UG/ML
25 INJECTION, SOLUTION INTRAMUSCULAR; INTRAVENOUS EVERY 5 MIN PRN
Status: DISCONTINUED | OUTPATIENT
Start: 2022-06-07 | End: 2022-06-09 | Stop reason: HOSPADM

## 2022-06-07 RX ORDER — BUPIVACAINE HYDROCHLORIDE 7.5 MG/ML
INJECTION, SOLUTION INTRASPINAL
Status: COMPLETED | OUTPATIENT
Start: 2022-06-07 | End: 2022-06-07

## 2022-06-07 RX ORDER — KETOROLAC TROMETHAMINE 30 MG/ML
30 INJECTION, SOLUTION INTRAMUSCULAR; INTRAVENOUS EVERY 6 HOURS
Status: DISPENSED | OUTPATIENT
Start: 2022-06-07 | End: 2022-06-08

## 2022-06-07 RX ORDER — ONDANSETRON 4 MG/1
4 TABLET, ORALLY DISINTEGRATING ORAL EVERY 6 HOURS PRN
Status: DISCONTINUED | OUTPATIENT
Start: 2022-06-07 | End: 2022-06-09 | Stop reason: HOSPADM

## 2022-06-07 RX ORDER — ACETAMINOPHEN 325 MG/1
975 TABLET ORAL EVERY 6 HOURS
Status: DISCONTINUED | OUTPATIENT
Start: 2022-06-07 | End: 2022-06-09 | Stop reason: HOSPADM

## 2022-06-07 RX ORDER — HALOPERIDOL 5 MG/ML
1 INJECTION INTRAMUSCULAR
Status: DISCONTINUED | OUTPATIENT
Start: 2022-06-07 | End: 2022-06-09 | Stop reason: HOSPADM

## 2022-06-07 RX ORDER — SIMETHICONE 80 MG
80 TABLET,CHEWABLE ORAL 4 TIMES DAILY PRN
Status: DISCONTINUED | OUTPATIENT
Start: 2022-06-07 | End: 2022-06-09 | Stop reason: HOSPADM

## 2022-06-07 RX ORDER — ONDANSETRON 2 MG/ML
INJECTION INTRAMUSCULAR; INTRAVENOUS PRN
Status: DISCONTINUED | OUTPATIENT
Start: 2022-06-07 | End: 2022-06-07

## 2022-06-07 RX ORDER — FENTANYL CITRATE 50 UG/ML
100 INJECTION, SOLUTION INTRAMUSCULAR; INTRAVENOUS
Status: DISCONTINUED | OUTPATIENT
Start: 2022-06-07 | End: 2022-06-09 | Stop reason: HOSPADM

## 2022-06-07 RX ORDER — MODIFIED LANOLIN
OINTMENT (GRAM) TOPICAL
Status: DISCONTINUED | OUTPATIENT
Start: 2022-06-07 | End: 2022-06-09 | Stop reason: HOSPADM

## 2022-06-07 RX ORDER — BISACODYL 10 MG
10 SUPPOSITORY, RECTAL RECTAL DAILY PRN
Status: DISCONTINUED | OUTPATIENT
Start: 2022-06-09 | End: 2022-06-09 | Stop reason: HOSPADM

## 2022-06-07 RX ORDER — NALBUPHINE HYDROCHLORIDE 10 MG/ML
2.5-5 INJECTION, SOLUTION INTRAMUSCULAR; INTRAVENOUS; SUBCUTANEOUS EVERY 6 HOURS PRN
Status: DISCONTINUED | OUTPATIENT
Start: 2022-06-07 | End: 2022-06-09 | Stop reason: HOSPADM

## 2022-06-07 RX ORDER — PROCHLORPERAZINE 25 MG
25 SUPPOSITORY, RECTAL RECTAL EVERY 12 HOURS PRN
Status: DISCONTINUED | OUTPATIENT
Start: 2022-06-07 | End: 2022-06-09 | Stop reason: HOSPADM

## 2022-06-07 RX ORDER — MORPHINE SULFATE 0.5 MG/ML
200 INJECTION, SOLUTION EPIDURAL; INTRATHECAL; INTRAVENOUS ONCE
Status: DISCONTINUED | OUTPATIENT
Start: 2022-06-07 | End: 2022-06-09 | Stop reason: HOSPADM

## 2022-06-07 RX ORDER — ONDANSETRON 4 MG/1
4 TABLET, ORALLY DISINTEGRATING ORAL EVERY 30 MIN PRN
Status: DISCONTINUED | OUTPATIENT
Start: 2022-06-07 | End: 2022-06-09 | Stop reason: HOSPADM

## 2022-06-07 RX ORDER — MEPERIDINE HYDROCHLORIDE 25 MG/ML
12.5 INJECTION INTRAMUSCULAR; INTRAVENOUS; SUBCUTANEOUS EVERY 5 MIN PRN
Status: DISCONTINUED | OUTPATIENT
Start: 2022-06-07 | End: 2022-06-09 | Stop reason: HOSPADM

## 2022-06-07 RX ORDER — OXYCODONE HYDROCHLORIDE 5 MG/1
5 TABLET ORAL EVERY 4 HOURS PRN
Status: DISCONTINUED | OUTPATIENT
Start: 2022-06-07 | End: 2022-06-09 | Stop reason: HOSPADM

## 2022-06-07 RX ORDER — BUPIVACAINE HYDROCHLORIDE 2.5 MG/ML
INJECTION, SOLUTION EPIDURAL; INFILTRATION; INTRACAUDAL
Status: COMPLETED | OUTPATIENT
Start: 2022-06-07 | End: 2022-06-07

## 2022-06-07 RX ADMIN — SODIUM CHLORIDE, SODIUM LACTATE, POTASSIUM CHLORIDE, CALCIUM CHLORIDE AND DEXTROSE MONOHYDRATE: 5; 600; 310; 30; 20 INJECTION, SOLUTION INTRAVENOUS at 15:02

## 2022-06-07 RX ADMIN — ACETAMINOPHEN 975 MG: 325 TABLET ORAL at 12:33

## 2022-06-07 RX ADMIN — Medication 300 ML/HR: at 13:36

## 2022-06-07 RX ADMIN — Medication 0.15 MG: at 13:08

## 2022-06-07 RX ADMIN — PHENYLEPHRINE HYDROCHLORIDE 0.5 MCG/KG/MIN: 10 INJECTION INTRAVENOUS at 13:11

## 2022-06-07 RX ADMIN — Medication 2 G: at 13:09

## 2022-06-07 RX ADMIN — METOCLOPRAMIDE HYDROCHLORIDE 10 MG: 5 INJECTION INTRAMUSCULAR; INTRAVENOUS at 20:26

## 2022-06-07 RX ADMIN — NALBUPHINE HYDROCHLORIDE 2.5 MG: 10 INJECTION, SOLUTION INTRAMUSCULAR; INTRAVENOUS; SUBCUTANEOUS at 20:27

## 2022-06-07 RX ADMIN — SODIUM CITRATE AND CITRIC ACID MONOHYDRATE 30 ML: 500; 334 SOLUTION ORAL at 12:33

## 2022-06-07 RX ADMIN — BUPIVACAINE HYDROCHLORIDE IN DEXTROSE 1.5 ML: 7.5 INJECTION, SOLUTION SUBARACHNOID at 13:08

## 2022-06-07 RX ADMIN — SODIUM CHLORIDE, SODIUM LACTATE, POTASSIUM CHLORIDE, CALCIUM CHLORIDE AND DEXTROSE MONOHYDRATE: 5; 600; 310; 30; 20 INJECTION, SOLUTION INTRAVENOUS at 19:36

## 2022-06-07 RX ADMIN — PHENYLEPHRINE HYDROCHLORIDE 100 MCG: 10 INJECTION INTRAVENOUS at 14:00

## 2022-06-07 RX ADMIN — SODIUM CHLORIDE, POTASSIUM CHLORIDE, SODIUM LACTATE AND CALCIUM CHLORIDE: 600; 310; 30; 20 INJECTION, SOLUTION INTRAVENOUS at 13:08

## 2022-06-07 RX ADMIN — ACETAMINOPHEN 975 MG: 325 TABLET ORAL at 19:36

## 2022-06-07 RX ADMIN — BUPIVACAINE 20 ML: 13.3 INJECTION, SUSPENSION, LIPOSOMAL INFILTRATION at 14:25

## 2022-06-07 RX ADMIN — SENNOSIDES AND DOCUSATE SODIUM 2 TABLET: 8.6; 5 TABLET ORAL at 21:52

## 2022-06-07 RX ADMIN — ONDANSETRON 4 MG: 2 INJECTION INTRAMUSCULAR; INTRAVENOUS at 13:15

## 2022-06-07 RX ADMIN — PHENYLEPHRINE HYDROCHLORIDE 100 MCG: 10 INJECTION INTRAVENOUS at 13:15

## 2022-06-07 RX ADMIN — KETOROLAC TROMETHAMINE 30 MG: 30 INJECTION, SOLUTION INTRAMUSCULAR at 21:52

## 2022-06-07 RX ADMIN — PHENYLEPHRINE HYDROCHLORIDE 100 MCG: 10 INJECTION INTRAVENOUS at 13:58

## 2022-06-07 RX ADMIN — BUPIVACAINE HYDROCHLORIDE 30 ML: 2.5 INJECTION, SOLUTION EPIDURAL; INFILTRATION; INTRACAUDAL at 14:25

## 2022-06-07 ASSESSMENT — ACTIVITIES OF DAILY LIVING (ADL)
DRESSING/BATHING_DIFFICULTY: NO
TOILETING_ISSUES: NO
WEAR_GLASSES_OR_BLIND: NO
CHANGE_IN_FUNCTIONAL_STATUS_SINCE_ONSET_OF_CURRENT_ILLNESS/INJURY: NO
DIFFICULTY_EATING/SWALLOWING: NO
WALKING_OR_CLIMBING_STAIRS_DIFFICULTY: NO
ADLS_ACUITY_SCORE: 22
ADLS_ACUITY_SCORE: 18
FALL_HISTORY_WITHIN_LAST_SIX_MONTHS: NO
CONCENTRATING,_REMEMBERING_OR_MAKING_DECISIONS_DIFFICULTY: NO
ADLS_ACUITY_SCORE: 22
ADLS_ACUITY_SCORE: 20
ADLS_ACUITY_SCORE: 18
DOING_ERRANDS_INDEPENDENTLY_DIFFICULTY: NO
ADLS_ACUITY_SCORE: 18

## 2022-06-07 NOTE — H&P
OB ADMISSION H&P - C SECTION     Date: 2022  NAME: Joseph Luevaon   : 1995  MRN: [id number]     CC: scheduled c section      HPI: Joseph Luevano is a 27 year old  with  single inter-uterine gestation at 39w2d, with Estimated Date of Delivery: 2022 admitted today for repeat c section.  section secondary to repeat. Patient feels well. Has no complaints and reports good fetal movement. Pregnancy has been complicated by Nothing. Patient denies headache, visual changes, RUQ pain. She denies contractions, leakage of fluid, or vaginal bleeding. Please see prenatal records.     OB HISTORY   OB History    Para Term  AB Living   3 2 2 0 0 2   SAB IAB Ectopic Multiple Live Births   0 0 0 0 2      # Outcome Date GA Lbr Andrea/2nd Weight Sex Delivery Anes PTL Lv   3 Current            2 Term 19 38w3d  3.48 kg (7 lb 10.8 oz) M CS-LTranv Spinal N PABLO      Name: DARLEENMALE-JOSEPH      Apgar1: 8  Apgar5: 9   1 Term 18 41w2d 08:18 / 04:24 3.24 kg (7 lb 2.3 oz) M CS-Unspec EPI, Nitrous N PABLO      Complications: Fetal Intolerance, Dysfunctional Labor, Cephalopelvic Disproportion      Name: Hiram      Apgar1: 9  Apgar5: 9       PAST MEDICAL HISTORY  Past Medical History:   Diagnosis Date     Abnormal fetal ultrasound 2018 = BPP 2/8     Abnormal glucose tolerance in pregnancy 3/20/2018    1hr *. 3hr GTT 76, 152, 108, 101.     Acute appendicitis 2019     Appendicitis 2019    Added automatically from request for surgery [id number]     Bacteriuria in pregnancy 2019     Congenital heart defect 2017    Normal echocardiogram. No follow up needed, per cardiology (see echo report).     CPD (cephalo-pelvic disproportion) 12/3/2019     Delivery by  section of full-term infant 2018     Hx of  section complicating pregnancy 2019     Low back pain during pregnancy in third trimester 10/1/2019     Marijuana abuse in remission 2018     Stopped when she became pregnant.     Oligohydramnios 2018     Oligohydramnios in third trimester, single or unspecified fetus      Post-term pregnancy, 40-42 weeks of gestation 2018    41+ weeks     Postpartum depression      Retrocecal appendix         PAST SURGICAL HISTORY:   Past Surgical History:   Procedure Laterality Date     APPENDECTOMY       C/SECTION, LOW TRANSVERSE       CARDIAC SURGERY      as a child      SECTION N/A 2018    Procedure:  SECTION;  Surgeon: Ming Potter MD;  Location: [facility] OR;  Service:       SECTION N/A 12/3/2019    Procedure:  SECTION, REPEAT;  Surgeon: Ming Potter MD;  [facility];  Service: Obstetrics     LAPAROSCOPIC CHOLECYSTECTOMY N/A 2018    Procedure: CHOLECYSTECTOMY, LAPAROSCOPIC;  Surgeon: Rafi Newsome MD;  Location: [facility] [place];  Service:      IN ERCP W/SPHINCTEROTOMY/PAPILLOTOMY N/A 2018    Procedure: ENDOSCOPIC RETROGRADE CHOLANGIOPANCREATOGRAPHY, SPHINCTEROTOMY; PANCREATIC DUCT STENT PLACEMENT;  Surgeon: Malik Edwards MD;  Location: [facility] OR;  Service: Gastroenterology     IN LAP,APPENDECTOMY N/A 2019    Procedure: APPENDECTOMY, LAPAROSCOPIC;  Surgeon: Devon Ellison MD;  Location: [facility];  Service: General        SOCIAL HISTORY   Reviewed, patient denies smoking, alcohol and drug use  She is  . Father is  involved    MEDICATIONS  Current Facility-Administered Medications   Medication     carboprost (HEMABATE) injection 250 mcg     ceFAZolin Sodium (ANCEF) injection 2 g     ceFAZolin Sodium (ANCEF) injection 2 g     fentaNYL (PF) (SUBLIMAZE) injection 100 mcg     lactated ringers infusion     lactated ringers infusion     lidocaine (LMX4) cream     lidocaine 1 % 0.1-1 mL     methylergonovine (METHERGINE) injection 200 mcg     midazolam (VERSED) injection 2 mg     misoprostol (CYTOTEC) tablet 400 mcg    Or     misoprostol (CYTOTEC)  "tablet 800 mcg     naloxone (NARCAN) injection 0.2 mg    Or     naloxone (NARCAN) injection 0.4 mg    Or     naloxone (NARCAN) injection 0.2 mg    Or     naloxone (NARCAN) injection 0.4 mg     oxytocin (PITOCIN) 30 units in 500 mL 0.9% NaCl infusion     oxytocin (PITOCIN) injection 10 Units     sodium chloride (PF) 0.9% PF flush 3 mL     sodium chloride (PF) 0.9% PF flush 3 mL     tranexamic acid (CYKLOKAPRON) bolus 1 g vial attach to NaCl 50 or 100 mL bag ADULT       ALLERGIES  No Known Allergies    ROS: otherwise negative except what is stated in HPI.     PHYSICAL EXAM   /66 (BP Location: Right arm, Cuff Size: Adult Regular)   Pulse 93   Temp 97.9  F (36.6  C) (Oral)   Resp 16   Ht 1.575 m (5' 2\")   Wt 95.3 kg (210 lb)   LMP 2021 (Exact Date)   SpO2 98%   BMI 38.41 kg/m     Gen: no acute distress, resting comfortably   CV: acyanotic   Heart: regular rate and rhythm   Pulm: unlabored respirations, clear to ausculation bilaterally    Abd: gravid, soft, nontender   Extremities: soft, nontender   FHR: positive, category 1  [place]: no contractions      LABS  @LABRSLTOB(ABORH EXT,LN-ABORH,HML ABO/RH,HGB EXT,HGB,RUBELLA EXT,LN-RUBELLA IGG ANTIBODY:Last:1)@     IMPRESSION:   27 year old [unfilled] at 39w2d   single inter uterine pregnancy at term   Pregnancy complications include: Prior CS   section secondary to repeat    PLAN:   - Admit to hospital  - Type and screen/hgb  - NPO   - Proceed with  section   - anesthesia notified       RISK - C SECTION  Patient counseled on risks, benefits, alternatives and expectations of  section.  Risks detailed to include, but not be limited to:  Pain, bleeding, infection, anesthesia complications, possible injury to bowel, bladder, baby and/or adjacent tissues, possible need for blood transfusion (with 1/50,000 risk of bloodborne pathogen [HIV and/or Hepatitis B/C] transmission) or even hysterectomy.  Patient voiced understanding of all R/B/A/E and " has agreed to proceed with  section for delivery if needed or recommended.      @ME2@

## 2022-06-07 NOTE — OP NOTE
PROCEDURE NOTE:      NAME:  Joseph Luevano   RECORD # [id number]   ADMIT DATE: 2022    DATE OF SERVICE: 2022     PREOPERATIVE DIAGNOSIS: previous uterine incision molly at 39 weeks    POST Op DX: Same plus severe pelvic adhesions    PROCEDURE: Low transverse  section , lysis of adhesions    SURGEON:  Ming Potter MD     ASSISTANT: Sherry Douglas MD    ANESTHESIA: spinal    Delivery QBL (mL): 965     DRAINS: Hernandez catheter.    COMPLICATIONS: Severe anterior abdominal wall adhesions    FINDINGS: Normal uterus, tubes and ovaries bilateral. Normal appearance to the adnexae.  Live female infant born, Apgars of 8   at 1 minute, 9  at 5 minutes,  Weight (oz):  113.93  oz.    CONSENT: Patient was met preoperatively where we discussed the procedure and the risks associated with the procedure.  She understood these to include but not limited to injury to adjacent organs including bowel, bladder, ureter, infection and bleeding. Understanding these risks her consents were signed.      PROCEDURE: Patient was brought to the operating room in stable condition.  After induction of a spinal anesthetic, fetal heart tones were checked and were stable. She was prepped and draped in sterile fashion for the procedure.  A timeout was then performed.      A pfannensteil skin incision was made to the level of the fascia.  The fascia was incised laterally. Kocher clamps were applied to the superior aspect of the incision which was sharply dissected from the rectus muscles.  The kocher clamps were then reapplied to the inferior aspect of the incision which was similarly sharply dissected from the rectus muscles.  The rectus muscles were  bluntly in the midline. There was a loss of anatomic landmarks wherein the anterior abdominal was fused with the uterine serosa. Therefore the myometrium was identified and incised. This incision was then extended.  A bladder blade was introduced. Care was used to avoid  bladder injury.  A low transverse uterine incision was made and amniotic sac was ruptured revealing clear amniotic fluid.  The baby's head was then delivered.There was not a nuchal cord noted. There was a spontaneous cry and therefore bulb suction was performed. The remainder of the infant was easily delivered. The cord was clamped x 2 and cut and the infant handed off to waiting nursing personnel.    The placenta was then manually removed from the uterus.  The uterus was exteriorized, covered with a moist laparotomy sponge and cleared of all clots and debris.  The uterine incision was closed with 0 chromic from both angles in a running locking suture and  A second imbricating suture of 0 was used for hemostasis. Multiple figure of eights were then applied to the raw uterine surface to control bleeding. The uterus was returned to the abdominopelvic cavity.  The pericolic gutters were cleared of all clots and debris. Stephanie was generously applied. The uterine incision was again inspected and noted to be hemostatic. Fascia was closed in a running manner using a Stratafix. Skin was closed with staples.  Patient tolerated this procedure well.  Sponge, lap and needle counts were correct x two.    Ming Potter MD        CC: Sherry Douglas Kevin Alexander Hallman, MD

## 2022-06-07 NOTE — ANESTHESIA PREPROCEDURE EVALUATION
Anesthesia Pre-Procedure Evaluation    Patient: Joseph Luevano   MRN: [id number] : 1995        Procedure : Procedure(s):  REPEAT  SECTION          Past Medical History:   Diagnosis Date     Abnormal fetal ultrasound 2018 = BPP 2/8     Abnormal glucose tolerance in pregnancy 3/20/2018    1hr *. 3hr GTT 76, 152, 108, 101.     Acute appendicitis 2019     Appendicitis 2019    Added automatically from request for surgery [id number]     Bacteriuria in pregnancy 2019     Congenital heart defect 2017    Normal echocardiogram. No follow up needed, per cardiology (see echo report).     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     Oligohydramnios 2018     Oligohydramnios in third trimester, single or unspecified fetus      Post-term pregnancy, 40-42 weeks of gestation 2018    41+ weeks     Postpartum depression      Retrocecal appendix       Past Surgical History:   Procedure Laterality Date     APPENDECTOMY       C/SECTION, LOW TRANSVERSE       CARDIAC SURGERY      as a child      SECTION N/A 2018    Procedure:  SECTION;  Surgeon: Ming Potter MD;  Location: [facility];  Service:       SECTION N/A 12/3/2019    Procedure:  SECTION, REPEAT;  Surgeon: Ming Potter MD;  Location: [facility] OR;  Service: Obstetrics     LAPAROSCOPIC CHOLECYSTECTOMY N/A 2018    Procedure: CHOLECYSTECTOMY, LAPAROSCOPIC;  Surgeon: Rafi Newsome MD;  Location: [facility] OR;  Service:      MD ERCP W/SPHINCTEROTOMY/PAPILLOTOMY N/A 2018    Procedure: ENDOSCOPIC RETROGRADE CHOLANGIOPANCREATOGRAPHY, SPHINCTEROTOMY; PANCREATIC DUCT STENT PLACEMENT;  Surgeon:  Malik Edwards MD;  [place] [facility]  Service: Gastroenterology     [place] LAP,APPENDECTOMY N/A 2019    Procedure: APPENDECTOMY, LAPAROSCOPIC;  Surgeon: Devon Ellison MD;  [place] [facility]  Service: General      No Known Allergies   Social History     Tobacco Use     Smoking status: Never Smoker     Smokeless tobacco: Never Used     Tobacco comment: boyfriend smokes outside   Substance Use Topics     Alcohol use: No      Wt Readings from Last 1 Encounters:   22 95.3 kg (210 lb)        Anesthesia Evaluation   Pt has had prior anesthetic. Type: Regional.        ROS/MED HX  ENT/Pulmonary:  - neg pulmonary ROS     Neurologic:  - neg neurologic ROS     Cardiovascular:  - neg cardiovascular ROS     METS/Exercise Tolerance:     Hematologic:  - neg hematologic  ROS     Musculoskeletal:       GI/Hepatic:     (+) GERD,     Renal/Genitourinary:       Endo:     (+) Obesity,     Psychiatric/Substance Use:       Infectious Disease:       Malignancy:       Other:      (+) , previous ,         Physical Exam    Airway        Mallampati: II   TM distance: > 3 FB   Neck ROM: full   Mouth opening: > 3 cm    Respiratory Devices and Support         Dental  no notable dental history         Cardiovascular   cardiovascular exam normal          Pulmonary   pulmonary exam normal                OUTSIDE LABS:  CBC:   Lab Results   Component Value Date    WBC 11.7 (H) 10/28/2021    WBC 9.3 2019    HGB 12.0 2022    HGB 12.0 2022    HCT 40.0 10/28/2021    HCT 36.3 2019     10/28/2021     2019     BMP:   Lab Results   Component Value Date     2019     2019    POTASSIUM 3.7 2019    POTASSIUM 3.6 2019    CHLORIDE 108 (H) 2019    CHLORIDE 106 2019    CO2 23 2019    CO2 19 (L) 2019    BUN 4 (L) 2019    BUN 9 2019    CR 0.69 2019    CR 0.64 2019     2019    GLC 95  05/11/2019     COAGS: No results found for: PTT, INR, FIBR  POC:   Lab Results   Component Value Date    HCG Positive (A) 10/28/2021    HCGS Negative 09/05/2018     HEPATIC:   Lab Results   Component Value Date    ALBUMIN 3.9 05/11/2019    PROTTOTAL 7.7 05/11/2019    ALT 20 05/11/2019    AST 17 05/11/2019    ALKPHOS 61 05/11/2019    BILITOTAL 0.4 05/11/2019     OTHER:   Lab Results   Component Value Date    A1C 5.3 10/28/2021    JUANITA 9.1 05/13/2019    PHOS 4.1 09/06/2018    MAG 2.1 09/06/2018    LIPASE 26 05/11/2019       Anesthesia Plan    ASA Status:  2      Anesthesia Type: Spinal.              Consents    Anesthesia Plan(s) and associated risks, benefits, and realistic alternatives discussed. Questions answered and patient/representative(s) expressed understanding.    - Discussed:     - Discussed with:  Patient         Postoperative Care            Comments:                Franky Martins MD

## 2022-06-07 NOTE — ANESTHESIA CARE TRANSFER NOTE
Patient: Joseph Luevano    Procedure: Procedure(s):  REPEAT  SECTION       Diagnosis: Previous  section [Z98.891]  Diagnosis Additional Information: No value filed.    Anesthesia Type:   Spinal     Note:    Oropharynx: oropharynx clear of all foreign objects and spontaneously breathing  Level of Consciousness: awake  Oxygen Supplementation: room air    Independent Airway: airway patency satisfactory and stable  Dentition: dentition unchanged  Vital Signs Stable: post-procedure vital signs reviewed and stable  Report to RN Given: handoff report given  Patient transferred to: Labor and Delivery    Handoff Report: Identifed the Patient, Identified the Reponsible Provider, Reviewed the pertinent medical history, Discussed the surgical course, Reviewed Intra-OP anesthesia mangement and issues during anesthesia, Set expectations for post-procedure period and Allowed opportunity for questions and acknowledgement of understanding      Vitals:  Vitals Value Taken Time   /56 22 1437   Temp 36.3  C (97.3  F) 22 1437   Pulse 71 22 1437   Resp 16 22 1437   SpO2 100 % 22 1444   Vitals shown include unvalidated device data.    Electronically Signed By: NILE Beard CRNA  2022  2:48 PM

## 2022-06-07 NOTE — ANESTHESIA PROCEDURE NOTES
TAP Procedure Note    Pre-Procedure   Staff -        Anesthesiologist:  Bao Larkin MD       Performed By: anesthesiologist       Location: OR       Procedure Start/Stop Times: 6/7/2022 2:16 PM and 6/7/2022 2:25 PM       Pre-Anesthestic Checklist: patient identified, IV checked, site marked, risks and benefits discussed, informed consent, monitors and equipment checked, pre-op evaluation, at physician/surgeon's request and post-op pain management  Timeout:       Correct Patient: Yes        Correct Procedure: Yes        Correct Site: Yes        Correct Position: Yes        Correct Laterality: Yes        Site Marked: Yes  Procedure Documentation  Procedure: TAP       Diagnosis: POST OP PAIN CONTROL       Laterality: bilateral       Patient Position: supine       Patient Prep/Sterile Barriers: sterile gloves, mask       Skin prep: Chloraprep       Needle Type: short bevel       Needle Gauge: 20.        Needle Length (Inches): 4        Ultrasound guided       1. Ultrasound was used to identify targeted nerve, plexus, vascular marker, or fascial plane and place a needle adjacent to it in real-time.       2. Ultrasound was used to visualize the spread of anesthetic in close proximity to the above referenced structure.       3. A permanent image is entered into the patient's record.       4. The visualized anatomic structures appeared normal.       5. There were no apparent abnormal pathologic findings.    Assessment/Narrative         The placement was negative for: blood aspirated, painful injection and site bleeding       Paresthesias: No.       Test dose of mL at.         Test dose negative, 3 minutes after injection, for signs of intravascular, subdural, or intrathecal injection.       Bolus given via needle. no blood aspirated via catheter.        Secured via.        Insertion/Infusion Method: Single Shot       Complications: none       Injection made incrementally with aspirations every 5  mL.    Medication(s) Administered   Bupivacaine 0.25% PF (Infiltration) - Infiltration   30 mL - 6/7/2022 2:25:00 PM  Bupivacaine liposome (Exparel) 1.3% LA inj susp (Infiltration) - Infiltration   20 mL - 6/7/2022 2:25:00 PM  Medication Administration Time: 6/7/2022 2:16 PM     Comments:  LA divided equally between both L and R sides

## 2022-06-07 NOTE — ANESTHESIA PROCEDURE NOTES
Intrathecal injection Procedure Note    Pre-Procedure   Staff -        Anesthesiologist:  Franky Martins MD       Performed By: anesthesiologist       Location: OB       Procedure Start/Stop Times: 6/7/2022 1:08 PM and 6/7/2022 1:10 PM       Pre-Anesthestic Checklist: patient identified, IV checked, risks and benefits discussed, informed consent, monitors and equipment checked, pre-op evaluation, at physician/surgeon's request and post-op pain management  Timeout:       Correct Patient: Yes        Correct Procedure: Yes        Correct Site: Yes        Correct Position: Yes   Procedure Documentation  Procedure: intrathecal injection       Patient Position: sitting       Skin prep: Chloraprep       Insertion Site: L3-4. (midline approach).       Needle Gauge: 24.        Needle Length (Inches): 4        Spinal Needle Type: Pencan       Introducer used       Introducer: 20 G       # of attempts: 1 and  # of redirects:  0    Assessment/Narrative         Paresthesias: No.       CSF fluid: clear.    Medication(s) Administered   0.75% Hyperbaric Bupivacaine (Intrathecal) - Intrathecal   1.5 mL - 6/7/2022 1:08:00 PM  Morphine PF 1 mg/mL (Intrathecal) - Intrathecal   0.15 mg - 6/7/2022 1:08:00 PM  Medication Administration Time: 6/7/2022 1:08 PM

## 2022-06-07 NOTE — PROGRESS NOTES
DELIVERY - 1st ASSIST NOTE   [facility]  Location: [facility]    Date of Service: 2022  Service Provider: Sherry Douglas DO MD, Family Medicine    PATIENT INFO     Patient Name: Joseph Luevano      MRN:  [id number]     :  1995       PCP:  Sherry Douglas at [facility]    : none - English fluent    Joseph Luevano is now a  at 39w2d by Estimated Date of Delivery: 2022 by LMP.      INDICATION FOR  DELIVERY: Repeat.      ANESTHESIA: spinal     OPERATIVE DESCRIPTION: Patient was taken to the OR and repeat low transverse  section performed in the usual fashion and without complications.  Please see OB/GYN surgical note for full details.  My assistance with and presence at the  section was medically indicated/necessary for both maternal and fetal well being as well as at the request of Abbey.  My duties included assisting with visualization, extraction of infant, and other duties as assigned by the primary surgeon.     Patient tolerated procedure well and went to back to room in good and stable condition.    Sherry Douglas DO, MD, 2022 2:30 PM

## 2022-06-08 LAB
HGB BLD-MCNC: 9.4 G/DL (ref 11.7–15.7)
T PALLIDUM AB SER QL: NONREACTIVE

## 2022-06-08 PROCEDURE — 250N000013 HC RX MED GY IP 250 OP 250 PS 637: Performed by: OBSTETRICS & GYNECOLOGY

## 2022-06-08 PROCEDURE — 250N000011 HC RX IP 250 OP 636: Performed by: OBSTETRICS & GYNECOLOGY

## 2022-06-08 PROCEDURE — 36415 COLL VENOUS BLD VENIPUNCTURE: CPT | Performed by: OBSTETRICS & GYNECOLOGY

## 2022-06-08 PROCEDURE — 85018 HEMOGLOBIN: CPT | Performed by: OBSTETRICS & GYNECOLOGY

## 2022-06-08 PROCEDURE — 120N000001 HC R&B MED SURG/OB

## 2022-06-08 RX ADMIN — IBUPROFEN 800 MG: 800 TABLET ORAL at 10:08

## 2022-06-08 RX ADMIN — OXYCODONE HYDROCHLORIDE 5 MG: 5 TABLET ORAL at 20:44

## 2022-06-08 RX ADMIN — ACETAMINOPHEN 975 MG: 325 TABLET ORAL at 07:37

## 2022-06-08 RX ADMIN — IBUPROFEN 800 MG: 800 TABLET ORAL at 15:51

## 2022-06-08 RX ADMIN — ACETAMINOPHEN 975 MG: 325 TABLET ORAL at 20:44

## 2022-06-08 RX ADMIN — ACETAMINOPHEN 975 MG: 325 TABLET ORAL at 01:19

## 2022-06-08 RX ADMIN — ACETAMINOPHEN 975 MG: 325 TABLET ORAL at 13:10

## 2022-06-08 RX ADMIN — NALBUPHINE HYDROCHLORIDE 2.5 MG: 10 INJECTION, SOLUTION INTRAMUSCULAR; INTRAVENOUS; SUBCUTANEOUS at 04:16

## 2022-06-08 RX ADMIN — OXYCODONE HYDROCHLORIDE 5 MG: 5 TABLET ORAL at 15:53

## 2022-06-08 RX ADMIN — KETOROLAC TROMETHAMINE 30 MG: 30 INJECTION, SOLUTION INTRAMUSCULAR at 04:04

## 2022-06-08 ASSESSMENT — ACTIVITIES OF DAILY LIVING (ADL)
ADLS_ACUITY_SCORE: 20
ADLS_ACUITY_SCORE: 18
ADLS_ACUITY_SCORE: 20
ADLS_ACUITY_SCORE: 18
ADLS_ACUITY_SCORE: 18

## 2022-06-08 NOTE — PROGRESS NOTES
"Csection - Post operative day 1    ASSESSMENT: PLAN:   POD#1    Repeat csection   Acute blood loss anemia- doing well- discussed s/s with patient- will monitor ()    Doing well  Continue routine cares   aniticipate possible  discharge in am    SUBJECTIVE:    The patient feels well: Catheter is out, bleeding decreased, tolerating normal diet, and passing flatus.  Pain is well controlled. The patient has no emotional concerns.  The baby is well and being fed    OBJECTIVE:  /63 (Patient Position: Semi-Iverson's, Cuff Size: Adult Regular)   Pulse 95   Temp 97.7  F (36.5  C) (Oral)   Resp 16   Ht 1.575 m (5' 2\")   Wt 95.3 kg (210 lb)   LMP 09/05/2021 (Exact Date)   SpO2 98%   Breastfeeding Unknown   BMI 38.41 kg/m      Fundus firm  Incision- dressing dry   Ext- nontender      Lab  Hemoglobin   Date Value Ref Range Status   06/08/2022 9.4 (L) 11.7 - 15.7 g/dL Final   ]        Jannie Blair MD  [facility]  [phone]    "

## 2022-06-08 NOTE — ANESTHESIA POSTPROCEDURE EVALUATION
Patient: Joseph Luevano    Procedure: Procedure(s):  REPEAT  SECTION       Anesthesia Type:  Spinal    Note:  Disposition: Inpatient   Postop Pain Control: Uneventful            Sign Out: Well controlled pain   PONV: No   Neuro/Psych: Uneventful            Sign Out: Acceptable/Baseline neuro status   Airway/Respiratory: Uneventful            Sign Out: Acceptable/Baseline resp. status   CV/Hemodynamics: Uneventful            Sign Out: Acceptable CV status; No obvious hypovolemia; No obvious fluid overload   Other NRE: NONE   DID A NON-ROUTINE EVENT OCCUR? No           Last vitals:  Vitals:    22 1820 22 1900 22   BP: 116/58 105/68 115/71   Pulse:  78    Resp: 16  16   Temp: 36.4  C (97.6  F)  36.8  C (98.3  F)   SpO2: 98% 97% 99%       Electronically Signed By: Bao Larkin MD  2022  8:33 PM

## 2022-06-08 NOTE — PLAN OF CARE
Problem: Adjustment to Role Transition (Postpartum  Delivery)  Goal: Successful Maternal Role Transition  Outcome: Ongoing, Progressing  Intervention: Support Maternal Role Transition    Problem: Postoperative Nausea and Vomiting (Postpartum  Delivery)  Goal: Nausea and Vomiting Relief  Outcome: Ongoing, Progressing    Patient vital signs and assessment findings were WNL. Patient fundus is firm at U/2 and bleeding is light. Patient is ambulating independently. Patient was able to tolerate food overnight. Patient is bonding well with infant and combination feeding. Pain is being managed with Toradol and Tylenol. Patient was given Nubain overnight due to complaints of itching.     Dariela Claire RN  2022 6:59 AM

## 2022-06-08 NOTE — PLAN OF CARE
Problem: Plan of Care - These are the overarching goals to be used throughout the patient stay.    Goal: Optimal Comfort and Wellbeing  Outcome: Ongoing, Progressing  Intervention: Monitor Pain and Promote Comfort  Recent Flowsheet Documentation  Taken 6/8/2022 0737 by Anjelica Webster RN  Pain Management Interventions: medication offered but refused  Intervention: Provide Person-Centered Care  Recent Flowsheet Documentation  Taken 6/8/2022 1550 by Anjelica Webster RN  Trust Relationship/Rapport:   care explained   questions answered  Taken 6/8/2022 0737 by Anjelica Webster RN  Trust Relationship/Rapport:   care explained   questions answered     VSS.  PP assessments WDL.  Bottle feeding well, occasionally puts baby to breast.  Pump set up and encouraged pumping after breast attempts and q 3 hours.  Independent with self and infant cares.  Encouraged to call with needs.

## 2022-06-08 NOTE — LACTATION NOTE
This note was copied from a baby's chart.  This writer met with Joseph per her request and lactation order.  She staets she has breast fed other children for 1-2 months but mostly pumped and bottle fed them  Education given on hand expression, the importance of optimal positioning for deep, comfortable latch and effective milk transfer, the use of breast compression to assist with milk transfer, listening for swallows, the importance of feeding baby on early hunger cues, and breastfeeding 8-12 times in 24 hours for optimal infant nutrition and hydration as well as for building an optimal milk supply.  She was encouraged to follow up at [facility] after discharge for any breastfeeding questions or concerns.  After education, Joseph was able to hand express a couple small drops of colostrum.  Iinfant placed in the football hold and, with Joseph shaping her breast to match infant's mouth, she was able to latch infant deeply onto the breast.  Infant needing occasional stimulation.  Infant able to actively suck at the breast with occasional swallows heard.  Joseph instructed to pump her breasts for every bottle infant receives.  This writer taught her the use of the hospital grade breast pump on Initiate program.  She was instructed to call RN to help make her a pumping bra when she is done breastfeeding infant.  verbalizes understanding of all education given.  She denies any further questions.  Lactation to follow up tomorrow, prn.

## 2022-06-08 NOTE — PLAN OF CARE
Joseph has stable VS.  She still has her li cath in per orders, but has ambulated ably around the unit X 1.  Her FFU1-2, light rubra lochia with a few clots. She has denied pain or rated it 1-2/10 when getting out of bed; no pain with ambulation.  She has been putting out just barely adequate amounts of orange urine, so D5LR was started for hydration and well as giving her much encouragement to drink more water, which she has been attempting to do since anti-emetic med following an emesis earlier in the evening. Joseph has been attempting to breastfeed, but baby has been sleepy and reluctant to latch.  She's been supplementing with formula.

## 2022-06-09 VITALS
WEIGHT: 210 LBS | RESPIRATION RATE: 16 BRPM | TEMPERATURE: 97.8 F | DIASTOLIC BLOOD PRESSURE: 64 MMHG | BODY MASS INDEX: 38.64 KG/M2 | OXYGEN SATURATION: 99 % | SYSTOLIC BLOOD PRESSURE: 136 MMHG | HEIGHT: 62 IN | HEART RATE: 86 BPM

## 2022-06-09 PROBLEM — Z98.891 S/P REPEAT LOW TRANSVERSE C-SECTION: Status: ACTIVE | Noted: 2022-06-09

## 2022-06-09 PROBLEM — O34.219 HX OF CESAREAN SECTION COMPLICATING PREGNANCY: Status: ACTIVE | Noted: 2021-12-16

## 2022-06-09 PROCEDURE — 250N000013 HC RX MED GY IP 250 OP 250 PS 637: Performed by: OBSTETRICS & GYNECOLOGY

## 2022-06-09 RX ORDER — IBUPROFEN 800 MG/1
800 TABLET, FILM COATED ORAL EVERY 6 HOURS
Qty: 40 TABLET | Refills: 0 | Status: SHIPPED | OUTPATIENT
Start: 2022-06-09

## 2022-06-09 RX ORDER — OXYCODONE HYDROCHLORIDE 5 MG/1
5 TABLET ORAL EVERY 4 HOURS PRN
Qty: 16 TABLET | Refills: 0 | Status: SHIPPED | OUTPATIENT
Start: 2022-06-09 | End: 2022-08-09

## 2022-06-09 RX ORDER — AMOXICILLIN 250 MG
2 CAPSULE ORAL 2 TIMES DAILY
Qty: 42 TABLET | Refills: 0 | Status: SHIPPED | OUTPATIENT
Start: 2022-06-09 | End: 2022-08-09

## 2022-06-09 RX ORDER — ACETAMINOPHEN 500 MG
500-1000 TABLET ORAL EVERY 6 HOURS PRN
Qty: 60 TABLET | Refills: 0 | Status: SHIPPED | OUTPATIENT
Start: 2022-06-09

## 2022-06-09 RX ADMIN — IBUPROFEN 800 MG: 800 TABLET ORAL at 07:24

## 2022-06-09 RX ADMIN — OXYCODONE HYDROCHLORIDE 5 MG: 5 TABLET ORAL at 11:43

## 2022-06-09 RX ADMIN — SIMETHICONE 80 MG: 80 TABLET, CHEWABLE ORAL at 11:43

## 2022-06-09 RX ADMIN — ACETAMINOPHEN 975 MG: 325 TABLET ORAL at 08:57

## 2022-06-09 RX ADMIN — SENNOSIDES AND DOCUSATE SODIUM 1 TABLET: 50; 8.6 TABLET ORAL at 08:57

## 2022-06-09 RX ADMIN — ACETAMINOPHEN 975 MG: 325 TABLET ORAL at 03:00

## 2022-06-09 RX ADMIN — IBUPROFEN 800 MG: 800 TABLET ORAL at 01:08

## 2022-06-09 RX ADMIN — SIMETHICONE 80 MG: 80 TABLET, CHEWABLE ORAL at 01:08

## 2022-06-09 RX ADMIN — IBUPROFEN 800 MG: 800 TABLET ORAL at 13:32

## 2022-06-09 RX ADMIN — OXYCODONE HYDROCHLORIDE 5 MG: 5 TABLET ORAL at 05:49

## 2022-06-09 RX ADMIN — OXYCODONE HYDROCHLORIDE 5 MG: 5 TABLET ORAL at 01:08

## 2022-06-09 ASSESSMENT — ACTIVITIES OF DAILY LIVING (ADL)
ADLS_ACUITY_SCORE: 18

## 2022-06-09 NOTE — DISCHARGE SUMMARY
[facility] Discharge Summary    Joseph Luevano MRN# [id number]   Age: 27 year old YOB: 1995     Date of Admission:  2022  Date of Discharge::  2022  Admitting Physician:  Ming Potter MD  Discharge Physician:  Jannie Blair MD     [facility]            Admission Diagnoses:   Previous  section [Z98.891]  PIH (pregnancy induced hypertension) [O13.9]          Discharge Diagnosis:   Previous  section [Z98.891]   acute blood loss anemia         Procedures:   Procedure(s): Repeat low transverse  section                  Medications Prior to Admission:     Medications Prior to Admission   Medication Sig Dispense Refill Last Dose     hydrOXYzine HCL (ATARAX) 10 MG tablet [HYDROXYZINE HCL (ATARAX) 10 MG TABLET] Take 1 tablet (10 mg total) by mouth 3 (three) times a day as needed for anxiety. 30 tablet 0      Prenatal MV-Min-Fe Fum-FA-DHA (PRENATAL MULTIVITAMIN PLUS DHA) 27-0.8-250 MG CAPS Take 1 tablet by mouth daily OK to substitute formulary equivalent 100 capsule 3      [DISCONTINUED] doxylamine (UNISOM) 25 MG TABS tablet Take half a pill before bed, as needed for nausea.  May cause drowsiness. 35 tablet 0      [DISCONTINUED] pyridOXINE (VITAMIN B6) 25 MG tablet Take 1 tablet (25 mg) by mouth every 6 hours as needed (nausea, vomiting, poor appetite) 35 tablet 0              Discharge Medications:     Current Discharge Medication List      START taking these medications    Details   acetaminophen (TYLENOL) 500 MG tablet Take 1-2 tablets (500-1,000 mg) by mouth every 6 hours as needed  Qty: 60 tablet, Refills: 0    Associated Diagnoses: S/P repeat low transverse       ibuprofen (ADVIL/MOTRIN) 800 MG tablet Take 1 tablet (800 mg) by mouth every 6 hours  Qty: 40 tablet, Refills: 0    Associated Diagnoses: S/P repeat low transverse       oxyCODONE (ROXICODONE) 5 MG tablet Take 1 tablet (5 mg) by mouth every 4  hours as needed for severe pain  Qty: 16 tablet, Refills: 0    Associated Diagnoses: S/P repeat low transverse       senna-docusate (SENOKOT-S/PERICOLACE) 8.6-50 MG tablet Take 2 tablets by mouth 2 times daily  Qty: 42 tablet, Refills: 0    Associated Diagnoses: S/P repeat low transverse          CONTINUE these medications which have NOT CHANGED    Details   hydrOXYzine HCL (ATARAX) 10 MG tablet [HYDROXYZINE HCL (ATARAX) 10 MG TABLET] Take 1 tablet (10 mg total) by mouth 3 (three) times a day as needed for anxiety.  Qty: 30 tablet, Refills: 0    Associated Diagnoses: Anxiety attack      Prenatal MV-Min-Fe Fum-FA-DHA (PRENATAL MULTIVITAMIN PLUS DHA) 27-0.8-250 MG CAPS Take 1 tablet by mouth daily OK to substitute formulary equivalent  Qty: 100 capsule, Refills: 3    Associated Diagnoses: Less than 8 weeks gestation of pregnancy         STOP taking these medications       doxylamine (UNISOM) 25 MG TABS tablet Comments:   Reason for Stopping:         pyridOXINE (VITAMIN B6) 25 MG tablet Comments:   Reason for Stopping:                     Consultations:   No consultations were requested during this admission          Brief History of Labor or Admission:   Admitted for surgery- repeat low segment transverse  section   , surgery without complications, postoperative course unremarkable             Hospital Course:   The patient's hospital course was unremarkable.  She recovered as anticipated and experienced no post-operative complications.  On discharge, her pain was well controlled. Vaginal bleeding is similar to peak menstrual flow.  Voiding without difficulty.  Ambulating well and tolerating a normal diet.  No fever or significant wound drainage.  Breastfeeding well.  Infant is stable.  No bowel movement yet.  She was discharged on post-partum day #32.    Post-partum hemoglobin:   Hemoglobin   Date Value Ref Range Status   2022 9.4 (L) 11.7 - 15.7 g/dL Final             Discharge  Instructions and Follow-Up:   Discharge diet: Regular   Discharge activity: No heavy lifting for 6 week(s)  No driving or operating machinery while on narcotic analgesics   Discharge follow-up: Follow up with Dr. Potter in 1 weeks   Wound care: May remove bandages in 3-5 days if still present              Discharge Disposition:   Discharged to home      Attestation:  I have reviewed today's vital signs, notes, medications, labs and imaging.    Jannie Blair MD

## 2022-06-09 NOTE — PLAN OF CARE
Problem: Plan of Care - These are the overarching goals to be used throughout the patient stay.    Goal: Optimal Comfort and Wellbeing  Outcome: Ongoing, Progressing  Intervention: Monitor Pain and Promote Comfort    Problem: Pain (Postpartum  Delivery)  Goal: Acceptable Pain Control  Outcome: Ongoing, Progressing  Intervention: Prevent or Manage Pain    Patient vital signs and assessment findings were WNL. Patient fundus is firm at U/1. Patient is ambulating independently and voiding without difficulty. Patient complained of gas pains overnight. Pain is being managed with ibuprofen, Tylenol, simethicone, and oxycodone for pain management. Patient was also given aqua k heating pad when gas pains continued. Patient had infant in bassinet with pillows. RN educated patient on safe sleep.    Dariela Claire RN  2022 4:00 AM

## 2022-06-09 NOTE — PLAN OF CARE
Problem: Pain (Postpartum  Delivery)  Goal: Acceptable Pain Control  Outcome: Met   Oxycodone, ibuprofen, and tylenol for pain control. Simethicone prn for gas discomfort and aqua K pad.     Voiding with no concerns. Independent in the room. Incision CDI with mepilex dressing. Verbalizes understanding of discharge instructions. Will continue to monitor. Radha Cabrera RN

## 2022-06-09 NOTE — PROGRESS NOTES
- Assumed care of . POD #1. POC discussed and agreed upon.    2030 - PP assessment complete, WNL.  Pt ambulating to BR independently.  Has some moderate pain with position changes. Desires Oxycodone to assist with pain management.  Independent with cares.

## 2022-06-09 NOTE — PROGRESS NOTES
"Outreach Nurse Note    Joseph Luevano  [id number]  1995    Chart reviewed, discharge follow-up plan discussed with patient's bedside RN, needs assessed. Post-delivery follow-up appointments with  planned in 1 & 6 weeks at [facility]. Patient, Joseph, is reported to have support at home, is connected with WIC, and feels ready to discharge today with ,\"Michelleviki Holguin\".     Outreach nurse will continue to follow and assist with discharge planning as needed. No additional discharge needs reported at this time.                "

## 2022-06-14 ENCOUNTER — MEDICAL CORRESPONDENCE (OUTPATIENT)
Dept: HEALTH INFORMATION MANAGEMENT | Facility: CLINIC | Age: 27
End: 2022-06-14
Payer: COMMERCIAL

## 2022-07-06 ENCOUNTER — TELEPHONE (OUTPATIENT)
Dept: FAMILY MEDICINE | Facility: CLINIC | Age: 27
End: 2022-07-06

## 2022-07-06 NOTE — TELEPHONE ENCOUNTER
Patient was seen at her child's well-child visit.  She had  1 month ago and on the furthest right side of the incision site it is a little bit open with white suture sticking out.  Under sterile technique I removed this white plastic suture.  It was mostly just sticking out so I think the body was already getting rid of it.  Antibiotic ointment given to patient to apply.  Follow-up if any further concerns, spreading redness, pain fever etc.

## 2022-07-16 ENCOUNTER — HEALTH MAINTENANCE LETTER (OUTPATIENT)
Age: 27
End: 2022-07-16

## 2022-08-09 ENCOUNTER — MEDICAL CORRESPONDENCE (OUTPATIENT)
Dept: FAMILY MEDICINE | Facility: CLINIC | Age: 27
End: 2022-08-09

## 2022-08-09 ENCOUNTER — OFFICE VISIT (OUTPATIENT)
Dept: FAMILY MEDICINE | Facility: CLINIC | Age: 27
End: 2022-08-09
Payer: COMMERCIAL

## 2022-08-09 VITALS
WEIGHT: 191.5 LBS | TEMPERATURE: 97.5 F | RESPIRATION RATE: 18 BRPM | HEART RATE: 86 BPM | BODY MASS INDEX: 35.03 KG/M2 | DIASTOLIC BLOOD PRESSURE: 75 MMHG | SYSTOLIC BLOOD PRESSURE: 111 MMHG

## 2022-08-09 PROBLEM — O13.9 PIH (PREGNANCY INDUCED HYPERTENSION): Status: RESOLVED | Noted: 2022-06-07 | Resolved: 2022-08-09

## 2022-08-09 RX ORDER — NORELGESTROMIN AND ETHINYL ESTRADIOL 35; 150 UG/MG; UG/MG
PATCH TRANSDERMAL
Qty: 9 PATCH | Refills: 3 | Status: SHIPPED | OUTPATIENT
Start: 2022-08-09

## 2022-08-09 NOTE — PROGRESS NOTES
Post Partum Check:  Delivery at 39w2d now .  Date of delivery: 22    Patient concerns: none. Removed Stitch healed. Feels like she is recovering much easier this time.     Bleeding: no concerns. Stopped. Already had period 2 weeks ago.     Pain: no concerns    LMP:Patient's last menstrual period was 2022.     Depression: no concerns  Postpartum Depression Screen:3  Item 10 (suicidal thoughts): Negative.  Interpretation Guide: Possible Depression = 10 or greater.     Contraception Plan:  patch    Immunizations needed:  COVID- declines    Pap smear:  Due- did not want to do today- will come back     Physical Exam:  Blood pressure 111/75, pulse 86, temperature 97.5  F (36.4  C), temperature source Temporal, resp. rate 18, weight 86.9 kg (191 lb 8 oz), last menstrual period 2022, unknown if currently breastfeeding. Body mass index is 35.03 kg/m .  Deferred exam til next week.       Assessment and Plan     27 year old  here today for postpartum check.  1. Discussed ideal body weight.   2. Return to work: n/a.  3. Pap smear she will message me when she can come next week to do this. had daughter with her today. .  4. Contraception Plan: Patch.  Reviewed how to start.  Backup contraception recommended for 1 month.  She had protected sex about a week ago.  Has already had a period.  5. Postpartum Evaluation of Pregnancy/Chronic Health Conditions: none.  6. Referrals: none.

## 2022-08-19 ENCOUNTER — OFFICE VISIT (OUTPATIENT)
Dept: FAMILY MEDICINE | Facility: CLINIC | Age: 27
End: 2022-08-19
Payer: COMMERCIAL

## 2022-08-19 VITALS
SYSTOLIC BLOOD PRESSURE: 108 MMHG | TEMPERATURE: 97.4 F | BODY MASS INDEX: 35.12 KG/M2 | RESPIRATION RATE: 16 BRPM | DIASTOLIC BLOOD PRESSURE: 72 MMHG | HEART RATE: 78 BPM | WEIGHT: 192 LBS

## 2022-08-19 DIAGNOSIS — Z12.4 CERVICAL CANCER SCREENING: Primary | ICD-10-CM

## 2022-08-19 PROCEDURE — G0145 SCR C/V CYTO,THINLAYER,RESCR: HCPCS | Performed by: FAMILY MEDICINE

## 2022-08-19 NOTE — PROGRESS NOTES
Joseph was seen today for gyn exam.    Diagnoses and all orders for this visit:    Cervical cancer screening  -     Pap Screen reflex to HPV if ASCUS - recommend age 25 - 29      Subjective   Joseph is a 27 year old accompanied by her self, presenting for the following health issues:  Gyn Exam      History of Present Illness       Reason for visit:  Pap smear    She eats 2-3 servings of fruits and vegetables daily.She consumes 1 sweetened beverage(s) daily.She exercises with enough effort to increase her heart rate 10 to 19 minutes per day.  She exercises with enough effort to increase her heart rate 3 or less days per week.   She is taking medications regularly.    Patient returned for pap smear as had child with her last week at PP visit.   Doing well. No concerns.   No abnormal discharge. No pelvic pain.   Has started patch for birth control    Review of Systems   Constitutional, HEENT, cardiovascular, pulmonary, gi and gu systems are negative, except as otherwise noted.      Objective    /72 (BP Location: Left arm, Patient Position: Sitting, Cuff Size: Adult Regular)   Pulse 78   Temp 97.4  F (36.3  C) (Temporal)   Resp 16   Wt 87.1 kg (192 lb)   LMP 07/25/2022   BMI 35.12 kg/m    Body mass index is 35.12 kg/m .  Physical Exam   GENERAL: healthy, alert and no distress   (female): normal female external genitalia, normal urethral meatus, vaginal mucosa, normal cervix/adnexa/uterus without masses or discharge. C/s site C/D/I. Well healed.   MS: no gross musculoskeletal defects noted, no edema                    .  ..

## 2022-08-24 LAB
BKR LAB AP GYN ADEQUACY: NORMAL
BKR LAB AP GYN INTERPRETATION: NORMAL
BKR LAB AP HPV REFLEX: NORMAL
BKR LAB AP PREVIOUS ABNORMAL: NORMAL
PATH REPORT.COMMENTS IMP SPEC: NORMAL
PATH REPORT.COMMENTS IMP SPEC: NORMAL
PATH REPORT.RELEVANT HX SPEC: NORMAL

## 2022-09-18 ENCOUNTER — HEALTH MAINTENANCE LETTER (OUTPATIENT)
Age: 27
End: 2022-09-18

## 2022-09-22 ENCOUNTER — MYC MEDICAL ADVICE (OUTPATIENT)
Dept: FAMILY MEDICINE | Facility: CLINIC | Age: 27
End: 2022-09-22

## 2022-09-23 ENCOUNTER — NURSE TRIAGE (OUTPATIENT)
Dept: FAMILY MEDICINE | Facility: CLINIC | Age: 27
End: 2022-09-23

## 2022-09-23 NOTE — TELEPHONE ENCOUNTER
Please see Nurse Triage for more information.    Closing encounter.    ANIRUDH ChaN, RN   [facility]

## 2022-09-23 NOTE — TELEPHONE ENCOUNTER
"Nurse Triage SBAR    Is this a 2nd Level Triage? NO    Situation:   Patient developed a rash on top of left arm deltoid area, where birth control patch was placed.    Background:   Patient has tried the same birth control patch last year, and did not develop these \"rashes.\"    Assessment:   Pt started birth control patch norelgestromin-ethinyl estradiol (ORTHO EVRA) 150-35 MCG/24HR patch at the end of August after seeing PCP for a visit.The other day, there was some localized swelling, but it has gone down. Every time she switches her patch, pt develops a \"rash in the area.\" Rash is dry, \"puffy\" and itchy. Pt was scratching area, so she has \"a bunch of red dots\" popping out. Spots are about \"pen dot size.\" Itchiness rated at 5/10. When pt takes the birth control patch off 2/10.     Right now, pt removed the patch because it was bothering patient. Pt has no other symptoms. No chance of pregnancy. Has not tried any medication for the itchiness.    Pt would like to ask PCP if she needs to switch birth control patch to a different method or continue to monitor?    Protocol Recommended Disposition:   Callback by PCP Today    Recommendation:  Care Advice given to patient.  Patient does not think she needs to be seen for her symptoms, and would like PCP's advice on what to do.    Routed to provider    DOLORES Cha, RN   [facility]    Does the patient meet one of the following criteria for ADS visit consideration? 16+ years old, with an [facility] PCP     TIP  Providers, please consider if this condition is appropriate for management at one of our Acute and Diagnostic Services sites.     If patient is a good candidate, please use dotphrase <dot>triageresponse and select Refer to ADS to document.      Reason for Disposition    Medication patch causing local rash or itching    Additional Information    Negative: Sounds like a life-threatening emergency to the triager    Negative: Athlete's Foot " suspected (i.e., itchy rash between the toes)    Negative: Insect bite(s) suspected    Negative: Jock Itch suspected (i.e., itchy rash on inner thighs near genital area)    Negative: Localized lump (or swelling) without redness or rash    Negative: Poison ivy, oak, or sumac contact suspected    Negative: Rash of female genital area (vulva)    Negative: Rash of male genital area (penis or scrotum)    Negative: Redness of immunization site    Negative: Shingles suspected (i.e., painful rash, multiple small blisters grouped together in one area of body; dermatomal distribution)    Negative: Small spot, skin growth, or mole    Negative: Wound infection suspected (i.e., pain, spreading redness, or pus; in a cut, puncture, scrape or sutured wound)    Negative: Fever and localized purple or blood-colored spots or dots that are not from injury or friction    Negative: Fever and localized rash is very painful    Negative: Patient sounds very sick or weak to the triager    Negative: Looks like a boil, infected sore, deep ulcer, or other infected rash (spreading redness, pus)    Negative: Painful rash with multiple small blisters grouped together (i.e., dermatomal distribution or 'band' or 'stripe')    Negative: Localized rash is very painful (no fever)    Negative: Localized purple or blood-colored spots or dots that are not from injury or friction (no fever)    Negative: Lyme disease suspected (e.g., bull's-eye rash or tick bite / exposure)    Negative: Patient wants to be seen    Negative: Tender bumps in armpits    Negative: Pimples (localized) and no improvement after using CARE ADVICE    Negative: SEVERE local itching persists after 2 days of steroid cream    Negative: Applying cream or ointment and it causes severe itch, burning, or pain    Protocols used: RASH OR REDNESS - [id number]

## 2022-09-23 NOTE — TELEPHONE ENCOUNTER
RN spoke to pt regarding Dr. Douglas's message below. Provider recommendation relay to patient.    RN also gave education on alternative birth control options should be used if pt removed the birth control patch.    Pt verbalizes understanding,agrees with plan and has no further questions.    RN assisted pt in scheduling virtual visit for:  Next 5 appointments (look out 90 days)    Oct 03, 2022  5:20 PM  (Arrive by 5:00 PM)  Provider Visit with Sherry Douglas DO  [facility] ) [address] [place] [address] [phone]        Closing encounter.    ANIRUDH ChaN, RN   [facility]

## 2022-09-23 NOTE — TELEPHONE ENCOUNTER
Provider Response to 2nd Level Triage Request    I have reviewed the RN documentation. My recommendation is:  virtual visit. will need to switch birth control

## 2022-10-03 ENCOUNTER — VIRTUAL VISIT (OUTPATIENT)
Dept: FAMILY MEDICINE | Facility: CLINIC | Age: 27
End: 2022-10-03
Payer: COMMERCIAL

## 2022-10-03 DIAGNOSIS — Z30.011 INITIATION OF OCP (BCP): Primary | ICD-10-CM

## 2022-10-03 PROBLEM — O34.219 HX OF CESAREAN SECTION COMPLICATING PREGNANCY: Status: RESOLVED | Noted: 2021-12-16 | Resolved: 2022-10-03

## 2022-10-03 PROCEDURE — 99213 OFFICE O/P EST LOW 20 MIN: CPT | Mod: 93 | Performed by: FAMILY MEDICINE

## 2022-10-03 RX ORDER — IBUPROFEN 400 MG/1
TABLET, FILM COATED ORAL
COMMUNITY
Start: 2022-06-09

## 2022-10-03 RX ORDER — NORGESTIMATE AND ETHINYL ESTRADIOL 7DAYSX3 28
1 KIT ORAL DAILY
Qty: 84 TABLET | Refills: 3 | Status: SHIPPED | OUTPATIENT
Start: 2022-10-03 | End: 2022-12-29

## 2022-10-03 NOTE — PATIENT INSTRUCTIONS
Discussed birth control options: combined pill, progesterone only, NuvaRing, Depo shot, implanon,  IUD.     I think the pill is a good choice for you.    Start the  pill on the Sunday after your period starts.  If you have nausea with the pills, you may feel better if you take it at bedtime.    That usually goes away as your body adjusts to the pill.   After last pill in the first pack, start the next pack right away    For cramps, you can take ibuprofen 400-600 mg 3-4 times a day - start it as soon as your period starts, even the night before if it is predictable    If you miss one pill, take 2 the next day; if you miss 2, take 2 a day for the next 2 days; if you miss more than 2, throw out the rest of the pack and start with day 1 of a new pack.     Take the pill at the same time every day.  If you miss pills or take them at different times, you will have less more spotting, less regular periods and they do not work as well to prevent pregnancy.  If you miss 1 pill, take it as soon as you remember - or 2 the next day.  If you miss 2 days, take 2 a day for the next 2 days. If you miss more than that you need to start a new pack.    Side effects -   common and not worrisome - nausea, irregular bleeding in the first few months after you start, slight weight gain  less common but more serious - severe headaches, chest pain, shortness of breath, swelling in your legs     It is normal to have some spotting, cramps, irregular cycles during the first couple months of using the pill.  That usually gets a lot better after a few months.  If not, you may need to switch to a different pill.       Start on the Sunday after your period starts helps to keep  your cycles more regular  It's ok to start right away if you need contraception right away     If you don't get your period during the week of reminder pills,  start the next pill pack on schedule then come in to be checked. That does not necessarily mean that you are  pregnant.    Recheck in 3 months, before you need any more refills  Call if you have questions before that.

## 2022-10-03 NOTE — PROGRESS NOTES
Joseph is a 27 year old who is being evaluated via a billable telephone visit.      What phone number would you like to be contacted at? [phone]  How would you like to obtain your AVS? Bernardo Ruiz was seen today for alternate birthcontrol.    Diagnoses and all orders for this visit:    Initiation of OCP (BCP): reviewed when to start, common side effects. She has no contraindications. Handout provided. F/u is any concerns.   -     norgestim-eth estrad triphasic (ORTHO TRI-CYCLEN) 0.18/0.215/0.25 MG-35 MCG tablet; Take 1 tablet by mouth daily    Contraception counseling -   We have discussed currently available contraceptive options including:  a.  Combination contraception - OCP, Nuvaring, patch - benefits include predictable bleeding; risks include HTN, migraines, DVT; downside is needs frequentintervention (daily for the pill, monthly for the Nuvaring, weekly for the patch).  b.  Progesterone only contraception - Depo Provera, Nexplanon, Mirena IUD - benefits include less intervention (q 3 months for Depo, q 3years for Nexplanon, q 5 years for Mirena IUD (other IUDs may be more frequent)); risks include irregular bleeding  c.  Non-hormonal contraception - condoms, Paragard IUD - benefits include no hormones; less intervention(for Paragard - lasts up to 10 years); risks include need for regular use for condoms, risks include IUD risk (possibility of malpositioning, intra-abdominal position for IUD)  d.  Surgical - tubal ligation - discussedrisk (surgical, irreversible), benefit (lifelong - no need for hormonal or further birth control)      Kanchan Ruiz is a 27 year old, presenting for the following health issues:  Alternate birthcontrol      HPI   This time for patch- got a rash  Last year with patch didn't have any issues.   After giving birth. hormonal changes?   Every spot she put the patch got the rash.  Try the pill again?  Maybe this time it will be different  Wants to control it  Doesn't  want anything long term.   Hx of headache and acne with the pill   If doesn't work try different pill?  Not breast feeding anymore  Two weeks or so without patch  Got period last week Tuesday-Saturday.   Had sex yesterday but used protection-    No hx of HTN, DVTs or migraines.     Review of Systems   Constitutional, HEENT, cardiovascular, pulmonary, gi and gu systems are negative, except as otherwise noted.      Objective           Vitals:  No vitals were obtained today due to virtual visit.    Physical Exam   healthy, alert and no distress  PSYCH: Alert and oriented times 3; coherent speech, normal   rate and volume, able to articulate logical thoughts, able   to abstract reason, no tangential thoughts, no hallucinations   or delusions  Her affect is normal  RESP: No cough, no audible wheezing, able to talk in full sentences  Remainder of exam unable to be completed due to telephone visits                Phone call duration: 14 minutes

## 2022-12-29 DIAGNOSIS — Z30.011 INITIATION OF OCP (BCP): ICD-10-CM

## 2022-12-29 RX ORDER — NORGESTIMATE AND ETHINYL ESTRADIOL 7DAYSX3 28
1 KIT ORAL DAILY
Qty: 84 TABLET | Refills: 3 | Status: SHIPPED | OUTPATIENT
Start: 2022-12-29

## 2022-12-29 NOTE — TELEPHONE ENCOUNTER
Medication Question or Refill    Contacts       Type Contact Phone/Fax    12/29/2022 10:57 AM CST Phone (Incoming) Joseph Luevano (Self) [phone] (M)          What medication are you calling about (include dose and sig)?: norgestim-eth estrad triphasic (ORTHO TRI-CYCLEN) 0.18/0.215/0.25 MG-35 MCG tablet    Controlled Substance Agreement on file:   CSA -- Patient Level:    CSA: None found at the patient level.       Who prescribed the medication?: Dr. Douglas    Do you need a refill? Yes: On the last week, needs ASAP    When did you use the medication last?     Patient offered an appointment?     Do you have any questions or concerns?  No    Preferred Pharmacy:     [id number] - [place], [place] - [address] [place] [address]  Phone: [phone] Fax: [phone]      Could we send this information to you in [employer] or would you prefer to receive a phone call?:   Patient would prefer a phone call   Okay to leave a detailed message?: Yes at Home number on file [phone] (home)

## 2023-04-24 ENCOUNTER — OFFICE VISIT (OUTPATIENT)
Dept: FAMILY MEDICINE | Facility: CLINIC | Age: 28
End: 2023-04-24
Payer: COMMERCIAL

## 2023-04-24 VITALS
SYSTOLIC BLOOD PRESSURE: 120 MMHG | BODY MASS INDEX: 36.58 KG/M2 | RESPIRATION RATE: 18 BRPM | HEART RATE: 76 BPM | TEMPERATURE: 98 F | OXYGEN SATURATION: 97 % | WEIGHT: 200 LBS | DIASTOLIC BLOOD PRESSURE: 87 MMHG

## 2023-04-24 DIAGNOSIS — L03.115 CELLULITIS OF RIGHT LOWER EXTREMITY: Primary | ICD-10-CM

## 2023-04-24 PROCEDURE — 99213 OFFICE O/P EST LOW 20 MIN: CPT | Performed by: NURSE PRACTITIONER

## 2023-04-24 RX ORDER — CEPHALEXIN 500 MG/1
500 CAPSULE ORAL 4 TIMES DAILY
Qty: 28 CAPSULE | Refills: 0 | Status: SHIPPED | OUTPATIENT
Start: 2023-04-24 | End: 2023-05-01

## 2023-04-24 NOTE — PATIENT INSTRUCTIONS
Monitor outlined area for expansion.  Okay for mild expansion outside of the lines within the first 24 hours, but after that wound should not be expanding.  Any abrupt increase in area of redness or fever, chills should prompt a return visit.

## 2023-04-24 NOTE — PROGRESS NOTES
Assessment & Plan     Cellulitis of right lower extremity    - cephALEXin (KEFLEX) 500 MG capsule  Dispense: 28 capsule; Refill: 0     Patient with area of erythema located on the right lower extremity consistent with cellulitis.     Advised the following  Monitor outlined area for expansion.  Okay for mild expansion outside of the lines within the first 24 hours, but after that wound should not be expanding.  Any abrupt increase in area of redness or fever, chills should prompt a return visit.              Return in about 3 days (around 4/27/2023) for If no better.    Lakshmi Alfredo, [facility] OVIDIO Ruiz is a 28 year old female who presents to clinic today for the following health issues:  Chief Complaint   Patient presents with     Insect Bite     Rt calf x Friday. Red and pinky. Tightening when using leg. Spreading per pt.     HPI    Woke up with area of erythema that is tender to touch located on the right shin and calf.    No known injury to the area.          Review of Systems  See HPI      Objective    /87   Pulse 76   Temp 98  F (36.7  C) (Oral)   Resp 18   Wt 90.7 kg (200 lb)   LMP 04/06/2023 (Exact Date)   SpO2 97%   BMI 36.58 kg/m    Physical Exam  Constitutional:       Appearance: Normal appearance.   Cardiovascular:      Pulses: Normal pulses.   Pulmonary:      Effort: Pulmonary effort is normal.   Skin:     Findings: Erythema (Patches of irregular shaped erythema located on the right shin wrapping around to calf, 10 cm x 6 cm and secondary area 6 cm x 1-1/2 cm on posterior calf, not connected) present.   Neurological:      Mental Status: She is alert.   Psychiatric:         Mood and Affect: Mood normal.

## 2023-05-10 NOTE — ANESTHESIA CARE TRANSFER NOTE
Last vitals:   Vitals:    09/06/18 1825   BP: (!) 140/98   Pulse: 88   Resp: 15   Temp: 37  C (98.6  F)   SpO2: 100%     Volatile agents turned off, muscle relaxant reversed, 4/4 twitches with sustained tetany. Pt breathing spontaneously with adequate tidal volumes, following commands, gently suctioned oropharynx, extubated without issue. 10LPM O2 applied via face mask.Transported by CRNA and RN to recovery.      Patient's level of consciousness is drowsy  Spontaneous respirations: yes  Maintains airway independently: yes  Dentition unchanged: yes  Oropharynx: oropharynx clear of all foreign objects    QCDR Measures:  ASA# 20 - Surgical Safety Checklist: WHO surgical safety checklist completed prior to induction  PQRS# 430 - Adult PONV Prevention: 4558F - Pt received => 2 anti-emetic agents (different classes) preop & intraop  ASA# 8 - Peds PONV Prevention: NA - Not pediatric patient, not GA or 2 or more risk factors NOT present  PQRS# 424 - Roxana-op Temp Management: 4559F - At least one body temp DOCUMENTED => 35.5C or 95.9F within required timeframe  PQRS# 426 - PACU Transfer Protocol: - Transfer of care checklist used  ASA# 14 - Acute Post-op Pain: ASA14B - Patient did NOT experience pain >= 7 out of 10   Returned patient's voice message inquiring whether follow up was necessary  Left message to inform patient she may follow up as needed per Dr White Expose

## 2023-07-30 ENCOUNTER — HEALTH MAINTENANCE LETTER (OUTPATIENT)
Age: 28
End: 2023-07-30

## 2024-01-22 ENCOUNTER — OFFICE VISIT (OUTPATIENT)
Dept: FAMILY MEDICINE | Facility: CLINIC | Age: 29
End: 2024-01-22
Payer: COMMERCIAL

## 2024-01-22 VITALS
WEIGHT: 210 LBS | TEMPERATURE: 97.6 F | DIASTOLIC BLOOD PRESSURE: 80 MMHG | RESPIRATION RATE: 18 BRPM | SYSTOLIC BLOOD PRESSURE: 123 MMHG | BODY MASS INDEX: 38.41 KG/M2 | HEART RATE: 71 BPM | OXYGEN SATURATION: 99 %

## 2024-01-22 DIAGNOSIS — R30.0 DYSURIA: Primary | ICD-10-CM

## 2024-01-22 LAB
ALBUMIN UR-MCNC: 100 MG/DL
APPEARANCE UR: ABNORMAL
BACTERIA #/AREA URNS HPF: ABNORMAL /HPF
BILIRUB UR QL STRIP: ABNORMAL
COLOR UR AUTO: ABNORMAL
GLUCOSE UR STRIP-MCNC: NEGATIVE MG/DL
HGB UR QL STRIP: ABNORMAL
KETONES UR STRIP-MCNC: NEGATIVE MG/DL
LEUKOCYTE ESTERASE UR QL STRIP: ABNORMAL
NITRATE UR QL: POSITIVE
PH UR STRIP: 5 [PH] (ref 5–8)
RBC #/AREA URNS AUTO: >100 /HPF
SP GR UR STRIP: >=1.03 (ref 1–1.03)
SQUAMOUS #/AREA URNS AUTO: ABNORMAL /LPF
UROBILINOGEN UR STRIP-ACNC: 1 E.U./DL
WBC #/AREA URNS AUTO: ABNORMAL /HPF
WBC CLUMPS #/AREA URNS HPF: PRESENT /HPF

## 2024-01-22 PROCEDURE — 99213 OFFICE O/P EST LOW 20 MIN: CPT | Performed by: STUDENT IN AN ORGANIZED HEALTH CARE EDUCATION/TRAINING PROGRAM

## 2024-01-22 PROCEDURE — 81001 URINALYSIS AUTO W/SCOPE: CPT | Performed by: STUDENT IN AN ORGANIZED HEALTH CARE EDUCATION/TRAINING PROGRAM

## 2024-01-22 PROCEDURE — 87086 URINE CULTURE/COLONY COUNT: CPT | Performed by: STUDENT IN AN ORGANIZED HEALTH CARE EDUCATION/TRAINING PROGRAM

## 2024-01-22 RX ORDER — NITROFURANTOIN 25; 75 MG/1; MG/1
100 CAPSULE ORAL 2 TIMES DAILY
Qty: 10 CAPSULE | Refills: 0 | Status: SHIPPED | OUTPATIENT
Start: 2024-01-22 | End: 2024-01-27

## 2024-01-22 NOTE — PROGRESS NOTES
Patient presents with:  UTI: X 3 weeks, painful urination, bladder feels full even after urinating, burning when urinating, frequent urination.       Clinical Decision Making: The patient's symptoms and urinalysis are consistent with a urinary tract infection.  Per chart review she previously had infections with E faecalis and E. coli, both of which were susceptible to nitrofurantoin.  I will prescribe a 5-day course of nitrofurantoin.  We will adjust as needed depending on sensitivity results from cultures.  Reassuring against pyelonephritis that she does not have fever, chills or flank pain.      ICD-10-CM    1. Dysuria  R30.0 UA Macroscopic with reflex to Microscopic and Culture - Clinic Collect     Urine Microscopic Exam     Urine Culture     nitroFURantoin macrocrystal-monohydrate (MACROBID) 100 MG capsule          Patient Instructions   I am concerned that your urinary discomfort is due to urinary tract infection that we will treat with a 5-day course of antibiotics.  If you have new fever, chills or flank pain, come back for evaluation.      HPI:  Joseph Luevano is a 28 year old female who presents today with dysuria.    Endorses increased urinary frequency, burning with urination, sensation of incomplete emptying of her bladder and increased urgency with a few episodes of incontinence over the last 3 days.  She notes that symptoms started after intercourse with her  3 days ago.    She does not have fever, chills, flank pain, abdominal pain, nausea vomiting diarrhea.  She does not take any regular medications.  Surgical history notable for , cholecystectomy, appendectomy.  No known allergies.  No pertinent family history.  Lives at home with her  and 3 children.    History obtained from the patient.    Problem List:  2022: S/P repeat low transverse   2022: PIH (pregnancy induced hypertension)  2022: History of oligohydramnios in prior pregnancy, currently   pregnant in  second trimester  2021: Bacteriuria during pregnancy  2021: Encounter for supervision of other normal pregnancy in third   trimester  2021: History of congenital heart defect  2021: Hx of  section complicating pregnancy  2021-10: Less than 8 weeks gestation of pregnancy  2019: CPD (cephalo-pelvic disproportion)  2019-10: Low back pain during pregnancy in third trimester  2019: Hx of  section complicating pregnancy  2019: Encounter for supervision of low-risk pregnancy in third   trimester  2019: Bacteriuria in pregnancy  2019: 16 weeks gestation of pregnancy  2018: Patient is a currently breast-feeding mother  2018: Delivery by  section of full-term infant  2018: Oligohydramnios  2018: Abnormal BPP  2018: Post-term pregnancy  2018: Obesity  2018: Abnormal glucose tolerance in pregnancy  2017: Normal pregnancy, first  Oligohydramnios in third trimester, single or unspecified fetus      Past Medical History:   Diagnosis Date    Abnormal fetal ultrasound 2018 = BPP 2/8    Abnormal glucose tolerance in pregnancy 3/20/2018    1hr *. 3hr GTT 76, 152, 108, 101.    Acute appendicitis 2019    Appendicitis 2019    Added automatically from request for surgery [id number]    Bacteriuria in pregnancy 2019    Congenital heart defect 2017    Normal echocardiogram. No follow up needed, per cardiology (see echo report).    CPD (cephalo-pelvic disproportion) 12/3/2019    Delivery by  section of full-term infant 2018    Hx of  section complicating pregnancy 2019    Low back pain during pregnancy in third trimester 10/1/2019    Marijuana abuse in remission 2018    Stopped when she became pregnant.    Oligohydramnios 2018    Oligohydramnios in third trimester, single or unspecified fetus     PIH (pregnancy induced hypertension) 2022    Post-term pregnancy, 40-42 weeks of gestation  2018    41+ weeks    Postpartum depression     Retrocecal appendix     S/P repeat low transverse  2022       Social History     Tobacco Use    Smoking status: Never    Smokeless tobacco: Never    Tobacco comments:     boyfriend smokes outside   Substance Use Topics    Alcohol use: No           Vitals:    24 1155   BP: 123/80   BP Location: Right arm   Patient Position: Sitting   Cuff Size: Adult Regular   Pulse: 71   Resp: 18   Temp: 97.6  F (36.4  C)   TempSrc: Oral   SpO2: 99%   Weight: 95.3 kg (210 lb)       Physical Exam  Constitutional:       Appearance: Normal appearance.   HENT:      Head: Normocephalic and atraumatic.      Right Ear: External ear normal.      Left Ear: External ear normal.      Nose: Nose normal.      Mouth/Throat:      Mouth: Mucous membranes are moist.   Eyes:      Extraocular Movements: Extraocular movements intact.      Conjunctiva/sclera: Conjunctivae normal.   Cardiovascular:      Rate and Rhythm: Normal rate and regular rhythm.   Pulmonary:      Effort: Pulmonary effort is normal.      Breath sounds: Normal breath sounds.   Abdominal:      Tenderness: There is no right CVA tenderness or left CVA tenderness.   Neurological:      General: No focal deficit present.      Mental Status: She is alert. Mental status is at baseline.         Results:  Results for orders placed or performed in visit on 24   UA Macroscopic with reflex to Microscopic and Culture - Clinic Collect     Status: Abnormal    Specimen: Urine, Clean Catch   Result Value Ref Range    Color Urine Dark Red (A) Colorless, Straw, Light Yellow, Yellow    Appearance Urine Slightly Cloudy (A) Clear    Glucose Urine Negative Negative mg/dL    Bilirubin Urine Moderate (A) Negative    Ketones Urine Negative Negative mg/dL    Specific Gravity Urine >=1.030 1.005 - 1.030    Blood Urine Large (A) Negative    pH Urine 5.0 5.0 - 8.0    Protein Albumin Urine 100 (A) Negative mg/dL    Urobilinogen Urine 1.0  0.2, 1.0 E.U./dL    Nitrite Urine Positive (A) Negative    Leukocyte Esterase Urine Small (A) Negative   Urine Microscopic Exam     Status: Abnormal   Result Value Ref Range    Bacteria Urine Moderate (A) None Seen /HPF    RBC Urine >100 (A) 0-2 /HPF /HPF    WBC Urine 25-50 (A) 0-5 /HPF /HPF    Squamous Epithelials Urine Few (A) None Seen /LPF    WBC Clumps Urine Present (A) None Seen /HPF         At the end of the encounter, I discussed results, diagnosis, medications. Discussed red flags for immediate return to clinic/ER, as well as indications for follow up if no improvement. Patient understood and agreed to plan. Patient was stable for discharge.

## 2024-01-22 NOTE — PATIENT INSTRUCTIONS
I am concerned that your urinary discomfort is due to urinary tract infection that we will treat with a 5-day course of antibiotics.  If you have new fever, chills or flank pain, come back for evaluation.

## 2024-01-23 LAB — BACTERIA UR CULT: NORMAL

## 2024-09-22 ENCOUNTER — HEALTH MAINTENANCE LETTER (OUTPATIENT)
Age: 29
End: 2024-09-22

## 2024-12-11 ENCOUNTER — HOSPITAL ENCOUNTER (OUTPATIENT)
Dept: GENERAL RADIOLOGY | Facility: HOSPITAL | Age: 29
Discharge: HOME | End: 2024-12-11
Attending: PHYSICIAN ASSISTANT
Payer: COMMERCIAL

## 2024-12-11 ENCOUNTER — OFFICE VISIT (OUTPATIENT)
Dept: URGENT CARE | Facility: URGENT CARE | Age: 29
End: 2024-12-11
Payer: COMMERCIAL

## 2024-12-11 VITALS
SYSTOLIC BLOOD PRESSURE: 124 MMHG | RESPIRATION RATE: 20 BRPM | DIASTOLIC BLOOD PRESSURE: 82 MMHG | TEMPERATURE: 98 F | HEART RATE: 82 BPM | OXYGEN SATURATION: 96 %

## 2024-12-11 DIAGNOSIS — Z11.52 ENCOUNTER FOR SCREENING FOR COVID-19: ICD-10-CM

## 2024-12-11 DIAGNOSIS — R05.1 ACUTE COUGH: ICD-10-CM

## 2024-12-11 DIAGNOSIS — J18.9 PNEUMONIA OF LEFT LOWER LOBE DUE TO INFECTIOUS ORGANISM: Primary | ICD-10-CM

## 2024-12-11 LAB
ANION GAP SERPL CALCULATED.3IONS-SCNC: 10 MMOL/L (ref 7–15)
BASOPHILS # BLD AUTO: 0 10E3/UL (ref 0–0.2)
BASOPHILS NFR BLD AUTO: 0 %
BUN SERPL-MCNC: 8.9 MG/DL (ref 6–20)
CALCIUM SERPL-MCNC: 8.8 MG/DL (ref 8.8–10.4)
CHLORIDE SERPL-SCNC: 105 MMOL/L (ref 98–107)
CREAT SERPL-MCNC: 0.76 MG/DL (ref 0.51–0.95)
EGFRCR SERPLBLD CKD-EPI 2021: >90 ML/MIN/1.73M2
EOSINOPHIL # BLD AUTO: 0.5 10E3/UL (ref 0–0.7)
EOSINOPHIL NFR BLD AUTO: 6 %
ERYTHROCYTE [DISTWIDTH] IN BLOOD BY AUTOMATED COUNT: 11.8 % (ref 10–15)
GLUCOSE SERPL-MCNC: 125 MG/DL (ref 70–99)
HCO3 SERPL-SCNC: 23 MMOL/L (ref 22–29)
HCT VFR BLD AUTO: 41 % (ref 35–47)
HGB BLD-MCNC: 13.8 G/DL (ref 11.7–15.7)
IMM GRANULOCYTES # BLD: 0 10E3/UL
IMM GRANULOCYTES NFR BLD: 0 %
LYMPHOCYTES # BLD AUTO: 2.2 10E3/UL (ref 0.8–5.3)
LYMPHOCYTES NFR BLD AUTO: 27 %
MCH RBC QN AUTO: 31.5 PG (ref 26.5–33)
MCHC RBC AUTO-ENTMCNC: 33.7 G/DL (ref 31.5–36.5)
MCV RBC AUTO: 94 FL (ref 78–100)
MONOCYTES # BLD AUTO: 0.6 10E3/UL (ref 0–1.3)
MONOCYTES NFR BLD AUTO: 8 %
NEUTROPHILS # BLD AUTO: 4.8 10E3/UL (ref 1.6–8.3)
NEUTROPHILS NFR BLD AUTO: 59 %
PLATELET # BLD AUTO: 263 10E3/UL (ref 150–450)
POTASSIUM SERPL-SCNC: 4.1 MMOL/L (ref 3.4–5.3)
RBC # BLD AUTO: 4.38 10E6/UL (ref 3.8–5.2)
SARS-COV-2 RNA RESP QL NAA+PROBE: NEGATIVE
SODIUM SERPL-SCNC: 138 MMOL/L (ref 135–145)
WBC # BLD AUTO: 8.2 10E3/UL (ref 4–11)

## 2024-12-11 PROCEDURE — 36415 COLL VENOUS BLD VENIPUNCTURE: CPT | Performed by: PHYSICIAN ASSISTANT

## 2024-12-11 PROCEDURE — 99214 OFFICE O/P EST MOD 30 MIN: CPT | Performed by: PHYSICIAN ASSISTANT

## 2024-12-11 PROCEDURE — 85025 COMPLETE CBC W/AUTO DIFF WBC: CPT | Performed by: PHYSICIAN ASSISTANT

## 2024-12-11 PROCEDURE — 87635 SARS-COV-2 COVID-19 AMP PRB: CPT | Performed by: PHYSICIAN ASSISTANT

## 2024-12-11 PROCEDURE — 71046 X-RAY EXAM CHEST 2 VIEWS: CPT

## 2024-12-11 PROCEDURE — 80048 BASIC METABOLIC PNL TOTAL CA: CPT | Performed by: PHYSICIAN ASSISTANT

## 2024-12-11 RX ORDER — ALBUTEROL SULFATE 90 UG/1
2 INHALANT RESPIRATORY (INHALATION) EVERY 6 HOURS
Qty: 18 G | Refills: 0 | Status: SHIPPED | OUTPATIENT
Start: 2024-12-11 | End: 2025-01-10

## 2024-12-11 RX ORDER — AZITHROMYCIN 500 MG/1
500 TABLET, FILM COATED ORAL DAILY
Qty: 5 TABLET | Refills: 0 | Status: SHIPPED | OUTPATIENT
Start: 2024-12-11 | End: 2024-12-16

## 2024-12-11 NOTE — PROGRESS NOTES
Patient presents with:  Cough: Lingering cough x 3 weeks. Little nasal congestion, wheezing on/off, chest tightness sternum, low grade fever and on/off headaches.      Clinical Decision Making:  CBC and chem basic is within normal limits. Chest xray does show a left lower lobe pneumonia.Treatment       ICD-10-CM    1. Pneumonia of left lower lobe due to infectious organism  J18.9 amoxicillin-clavulanate (AUGMENTIN) 875-125 MG tablet     azithromycin (ZITHROMAX) 500 MG tablet     albuterol (PROAIR HFA/PROVENTIL HFA/VENTOLIN HFA) 108 (90 Base) MCG/ACT inhaler      2. Acute cough  R05.1 CBC with Platelets & Differential     Basic metabolic panel     XR Chest 2 Views      3. Encounter for screening for COVID-19  Z11.52 COVID-19 Virus (Coronavirus) by PCR Nose          Patient Instructions   You were seen today for pneumonia.    Take antibiotics as written.    Symptom management:  - Get plenty of rest  - Avoid smoking and second hand smoke  - May take tylenol or ibuprofen for fever/discomfort  - Drink plenty of non-caffeinated fluids  - Use nasal steroid spray for sinus congestion  - Albuterol inhaler may be used every 6 hours as needed for chest tightness      Reasons to be seen in the emergency room:  - Develop a fever of 100.4 or higher  - Cough changes, coughing up blood, or become short of breath  - Neck stiffness  - Chest pain  - Severe headache  - Unable to tolerate eating or drinking fluids    Otherwise, if no symptom improvement after 5 days, follow-up with your primary care provider.        HPI:  Joseph Luevano is a 29 year old female who presents today for cough for three weeks and some nasal congestion. Has had chest congestion     History obtained from chart review and the patient.    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)  2019-10: Low back pain during pregnancy in third trimester  2019: Hx of  section complicating pregnancy  2019: Encounter for supervision of low-risk pregnancy in third   trimester  2019: Bacteriuria in pregnancy  2019: 16 weeks gestation of pregnancy  2018: Patient is a currently breast-feeding mother  2018: Delivery by  section of full-term infant  2018: Oligohydramnios  2018: Abnormal BPP  2018: Post-term pregnancy  2018: Obesity  2018: Abnormal glucose tolerance in pregnancy  2017: Normal pregnancy, first  Oligohydramnios in third trimester, single or unspecified fetus      Past Medical History:   Diagnosis Date    Abnormal fetal ultrasound 2018 = BPP 2/8    Abnormal glucose tolerance in pregnancy 3/20/2018    1hr *. 3hr GTT 76, 152, 108, 101.    Acute appendicitis 2019    Appendicitis 2019    Added automatically from request for surgery [id number]    Bacteriuria in pregnancy 2019    Congenital heart defect 2017    Normal echocardiogram. No follow up needed, per cardiology (see echo report).    CPD (cephalo-pelvic disproportion) 12/3/2019    Delivery by  section of full-term infant 2018    Hx of  section complicating pregnancy 2019    Low back pain during pregnancy in third trimester 10/1/2019    Marijuana abuse in remission 2018    Stopped when she became pregnant.    Oligohydramnios 2018    Oligohydramnios in third trimester, single or unspecified fetus     PIH (pregnancy induced hypertension) 2022    Post-term pregnancy, 40-42 weeks of gestation 2018    41+ weeks    Postpartum  depression     Retrocecal appendix     S/P repeat low transverse  2022       Social History     Tobacco Use    Smoking status: Former     Types: Other     Quit date: 2024     Years since quittin.1     Passive exposure: Current (boyfriend smokes around and outside)    Smokeless tobacco: Never   Substance Use Topics    Alcohol use: No       Review of Systems  As above in HPI otherwise negative.    Vitals:    24 1003   BP: 124/82   Pulse: 82   Resp: 20   Temp: 98  F (36.7  C)   TempSrc: Tympanic   SpO2: 96%       General: Patient is resting comfortably no acute distress is afebrile  HEENT: Head is normocephalic atraumatic   eyes are PERRL EOMI sclera anicteric   TMs are clear bilaterally  Throat is with mild pharyngeal wall erythema and no exudate  No cervical lymphadenopathy present  LUNGS: decreased breath sounds in the left lower lung field  HEART: Regular rate and rhythm  Skin: Without rash non-diaphoretic    Physical Exam      Labs:  Results for orders placed or performed in visit on 24   XR Chest 2 Views     Status: None    Narrative    EXAM: XR CHEST 2 VIEWS  LOCATION: [facility]  DATE: 2024    INDICATION: Cough x3 weeks.  COMPARISON: None.      Impression    IMPRESSION: Trace airspace opacity posterior basilar left lower lobe is compatible with pneumonia in the proper clinical setting. Lungs otherwise clear. No pleural effusion. Slight prominence of the central pulmonary arteries. Heart size normal.   Basic metabolic panel     Status: Abnormal   Result Value Ref Range    Sodium 138 135 - 145 mmol/L    Potassium 4.1 3.4 - 5.3 mmol/L    Chloride 105 98 - 107 mmol/L    Carbon Dioxide (CO2) 23 22 - 29 mmol/L    Anion Gap 10 7 - 15 mmol/L    Urea Nitrogen 8.9 6.0 - 20.0 mg/dL    Creatinine 0.76 0.51 - 0.95 mg/dL    GFR Estimate >90 >60 mL/min/1.73m2    Calcium 8.8 8.8 - 10.4 mg/dL    Glucose 125 (H) 70 - 99 mg/dL   COVID-19 Virus (Coronavirus) by PCR  Nose     Status: Normal    Specimen: Nose; Swab   Result Value Ref Range    SARS CoV2 PCR Negative Negative    Narrative    Testing was performed using the Xpert Xpress SARS-CoV-2 Assay on the [employer]Xpert [employer] Systems. Additional information about this assay can be found via the Test Directory. This [place] FDA cleared test should be ordered for the detection of SARS-CoV-2 in individuals with signs and symptoms of respiratory tract infection. This test is for in vitro diagnostic use under the [place] FDA for laboratories certified under CLIA to perform high complexity testing. A negative result does not rule out the presence of PCR inhibitors in the specimen or target RNA concentration below the limit of detection for the assay. The possibility of a false negative should be considered if the patient's recent exposure or clinical presentation suggests COVID-19. This test was validated by [facility]. These Laboratories are certified under the  Clinical Laboratory Improvement Amendments (CLIA) as qualified to perform high complexity testing.   CBC with platelets and differential     Status: None   Result Value Ref Range    WBC Count 8.2 4.0 - 11.0 10e3/uL    RBC Count 4.38 3.80 - 5.20 10e6/uL    Hemoglobin 13.8 11.7 - 15.7 g/dL    Hematocrit 41.0 35.0 - 47.0 %    MCV 94 78 - 100 fL    MCH 31.5 26.5 - 33.0 pg    MCHC 33.7 31.5 - 36.5 g/dL    RDW 11.8 10.0 - 15.0 %    Platelet Count 263 150 - 450 10e3/uL    % Neutrophils 59 %    % Lymphocytes 27 %    % Monocytes 8 %    % Eosinophils 6 %    % Basophils 0 %    % Immature Granulocytes 0 %    Absolute Neutrophils 4.8 1.6 - 8.3 10e3/uL    Absolute Lymphocytes 2.2 0.8 - 5.3 10e3/uL    Absolute Monocytes 0.6 0.0 - 1.3 10e3/uL    Absolute Eosinophils 0.5 0.0 - 0.7 10e3/uL    Absolute Basophils 0.0 0.0 - 0.2 10e3/uL    Absolute Immature Granulocytes 0.0 <=0.4 10e3/uL   CBC with Platelets & Differential     Status: None    Narrative    The following orders  were created for panel order CBC with Platelets & Differential.  Procedure                               Abnormality         Status                     ---------                               -----------         ------                     CBC with platelets and d...[[id number]]                      Final result                 Please view results for these tests on the individual orders.       Radiology:  I have personally ordered and preliminarily reviewed the following xray, I have noted a pneumonia of the left lower lobe.      At the end of the encounter, I discussed results, diagnosis, medications. Discussed red flags for immediate return to clinic/ER, as well as indications for follow up if no improvement. Patient understood and agreed to plan. Patient was stable for discharge.

## 2024-12-16 ENCOUNTER — HOSPITAL ENCOUNTER (INPATIENT)
Facility: HOSPITAL | Age: 29
LOS: 1 days | Discharge: HOME | End: 2024-12-17
Attending: EMERGENCY MEDICINE | Admitting: INTERNAL MEDICINE
Payer: COMMERCIAL

## 2024-12-16 ENCOUNTER — APPOINTMENT (OUTPATIENT)
Dept: CT IMAGING | Facility: HOSPITAL | Age: 29
End: 2024-12-16
Attending: EMERGENCY MEDICINE
Payer: COMMERCIAL

## 2024-12-16 DIAGNOSIS — K76.0 HEPATIC STEATOSIS: ICD-10-CM

## 2024-12-16 DIAGNOSIS — J18.9 COMMUNITY ACQUIRED PNEUMONIA OF LEFT LOWER LOBE OF LUNG: ICD-10-CM

## 2024-12-16 DIAGNOSIS — N30.01 ACUTE CYSTITIS WITH HEMATURIA: ICD-10-CM

## 2024-12-16 DIAGNOSIS — N20.1 URETERAL STONE: Primary | ICD-10-CM

## 2024-12-16 DIAGNOSIS — N20.1 URETEROLITHIASIS: ICD-10-CM

## 2024-12-16 PROBLEM — R65.10 SIRS (SYSTEMIC INFLAMMATORY RESPONSE SYNDROME) (H): Status: ACTIVE | Noted: 2024-12-16

## 2024-12-16 PROBLEM — N23 RENAL COLIC: Status: ACTIVE | Noted: 2024-12-16

## 2024-12-16 PROBLEM — N39.0 URINARY TRACT INFECTION: Status: ACTIVE | Noted: 2024-12-16

## 2024-12-16 PROBLEM — J18.0 BRONCHOPNEUMONIA: Status: ACTIVE | Noted: 2024-12-16

## 2024-12-16 LAB
ALBUMIN SERPL BCG-MCNC: 4.3 G/DL (ref 3.5–5.2)
ALBUMIN UR-MCNC: 30 MG/DL
ALP SERPL-CCNC: 72 U/L (ref 40–150)
ALT SERPL W P-5'-P-CCNC: 43 U/L (ref 0–50)
AMPHETAMINES UR QL SCN: ABNORMAL
ANION GAP SERPL CALCULATED.3IONS-SCNC: 14 MMOL/L (ref 7–15)
APPEARANCE UR: ABNORMAL
AST SERPL W P-5'-P-CCNC: 38 U/L (ref 0–45)
BACTERIA #/AREA URNS HPF: ABNORMAL /HPF
BARBITURATES UR QL SCN: ABNORMAL
BASOPHILS # BLD AUTO: 0.1 10E3/UL (ref 0–0.2)
BASOPHILS NFR BLD AUTO: 1 %
BENZODIAZ UR QL SCN: ABNORMAL
BILIRUB SERPL-MCNC: 0.6 MG/DL
BILIRUB UR QL STRIP: NEGATIVE
BUN SERPL-MCNC: 9.4 MG/DL (ref 6–20)
BZE UR QL SCN: ABNORMAL
CALCIUM SERPL-MCNC: 9.5 MG/DL (ref 8.8–10.4)
CANNABINOIDS UR QL SCN: ABNORMAL
CAOX CRY #/AREA URNS HPF: ABNORMAL /HPF
CHLORIDE SERPL-SCNC: 108 MMOL/L (ref 98–107)
COLOR UR AUTO: ABNORMAL
CREAT SERPL-MCNC: 0.77 MG/DL (ref 0.51–0.95)
EGFRCR SERPLBLD CKD-EPI 2021: >90 ML/MIN/1.73M2
EOSINOPHIL # BLD AUTO: 0.4 10E3/UL (ref 0–0.7)
EOSINOPHIL NFR BLD AUTO: 3 %
ERYTHROCYTE [DISTWIDTH] IN BLOOD BY AUTOMATED COUNT: 12 % (ref 10–15)
EST. AVERAGE GLUCOSE BLD GHB EST-MCNC: 105 MG/DL
FENTANYL UR QL: ABNORMAL
FLUAV RNA SPEC QL NAA+PROBE: NEGATIVE
FLUBV RNA RESP QL NAA+PROBE: NEGATIVE
GLUCOSE SERPL-MCNC: 117 MG/DL (ref 70–99)
GLUCOSE UR STRIP-MCNC: NEGATIVE MG/DL
HBA1C MFR BLD: 5.3 %
HCG SERPL QL: NEGATIVE
HCO3 SERPL-SCNC: 19 MMOL/L (ref 22–29)
HCT VFR BLD AUTO: 42.3 % (ref 35–47)
HGB BLD-MCNC: 14.3 G/DL (ref 11.7–15.7)
HGB UR QL STRIP: ABNORMAL
IMM GRANULOCYTES # BLD: 0 10E3/UL
IMM GRANULOCYTES NFR BLD: 0 %
KETONES UR STRIP-MCNC: 40 MG/DL
L PNEUMO1 AG UR QL IA: NEGATIVE
LACTATE SERPL-SCNC: 2.4 MMOL/L (ref 0.7–2)
LEUKOCYTE ESTERASE UR QL STRIP: ABNORMAL
LIPASE SERPL-CCNC: 47 U/L (ref 13–60)
LYMPHOCYTES # BLD AUTO: 3.1 10E3/UL (ref 0.8–5.3)
LYMPHOCYTES NFR BLD AUTO: 23 %
MAGNESIUM SERPL-MCNC: 1.9 MG/DL (ref 1.7–2.3)
MCH RBC QN AUTO: 31.4 PG (ref 26.5–33)
MCHC RBC AUTO-ENTMCNC: 33.8 G/DL (ref 31.5–36.5)
MCV RBC AUTO: 93 FL (ref 78–100)
MONOCYTES # BLD AUTO: 0.6 10E3/UL (ref 0–1.3)
MONOCYTES NFR BLD AUTO: 5 %
MUCOUS THREADS #/AREA URNS LPF: PRESENT /LPF
NEUTROPHILS # BLD AUTO: 9 10E3/UL (ref 1.6–8.3)
NEUTROPHILS NFR BLD AUTO: 68 %
NITRATE UR QL: NEGATIVE
NRBC # BLD AUTO: 0 10E3/UL
NRBC BLD AUTO-RTO: 0 /100
OPIATES UR QL SCN: ABNORMAL
PCP QUAL URINE (ROCHE): ABNORMAL
PH UR STRIP: 7 [PH] (ref 5–7)
PLATELET # BLD AUTO: 283 10E3/UL (ref 150–450)
POTASSIUM SERPL-SCNC: 4.3 MMOL/L (ref 3.4–5.3)
PROCALCITONIN SERPL IA-MCNC: 0.02 NG/ML
PROT SERPL-MCNC: 7.7 G/DL (ref 6.4–8.3)
RBC # BLD AUTO: 4.56 10E6/UL (ref 3.8–5.2)
RBC URINE: >182 /HPF
RSV RNA SPEC NAA+PROBE: NEGATIVE
S PNEUM AG SPEC QL: NEGATIVE
SARS-COV-2 RNA RESP QL NAA+PROBE: NEGATIVE
SODIUM SERPL-SCNC: 141 MMOL/L (ref 135–145)
SP GR UR STRIP: 1.01 (ref 1–1.03)
SPECIMEN TYPE: NORMAL
UROBILINOGEN UR STRIP-MCNC: <2 MG/DL
WBC # BLD AUTO: 13.2 10E3/UL (ref 4–11)
WBC URINE: 21 /HPF

## 2024-12-16 PROCEDURE — 36415 COLL VENOUS BLD VENIPUNCTURE: CPT | Performed by: INTERNAL MEDICINE

## 2024-12-16 PROCEDURE — 250N000011 HC RX IP 250 OP 636: Performed by: EMERGENCY MEDICINE

## 2024-12-16 PROCEDURE — 80307 DRUG TEST PRSMV CHEM ANLYZR: CPT | Performed by: INTERNAL MEDICINE

## 2024-12-16 PROCEDURE — 258N000003 HC RX IP 258 OP 636: Performed by: EMERGENCY MEDICINE

## 2024-12-16 PROCEDURE — 96375 TX/PRO/DX INJ NEW DRUG ADDON: CPT

## 2024-12-16 PROCEDURE — 120N000001 HC R&B MED SURG/OB

## 2024-12-16 PROCEDURE — 81001 URINALYSIS AUTO W/SCOPE: CPT | Performed by: EMERGENCY MEDICINE

## 2024-12-16 PROCEDURE — 83690 ASSAY OF LIPASE: CPT | Performed by: EMERGENCY MEDICINE

## 2024-12-16 PROCEDURE — 99223 1ST HOSP IP/OBS HIGH 75: CPT | Performed by: INTERNAL MEDICINE

## 2024-12-16 PROCEDURE — 83605 ASSAY OF LACTIC ACID: CPT | Performed by: INTERNAL MEDICINE

## 2024-12-16 PROCEDURE — 71275 CT ANGIOGRAPHY CHEST: CPT

## 2024-12-16 PROCEDURE — 85049 AUTOMATED PLATELET COUNT: CPT | Performed by: EMERGENCY MEDICINE

## 2024-12-16 PROCEDURE — 96365 THER/PROPH/DIAG IV INF INIT: CPT | Mod: 59

## 2024-12-16 PROCEDURE — 84703 CHORIONIC GONADOTROPIN ASSAY: CPT | Performed by: EMERGENCY MEDICINE

## 2024-12-16 PROCEDURE — 87633 RESP VIRUS 12-25 TARGETS: CPT | Performed by: INTERNAL MEDICINE

## 2024-12-16 PROCEDURE — 87637 SARSCOV2&INF A&B&RSV AMP PRB: CPT | Performed by: INTERNAL MEDICINE

## 2024-12-16 PROCEDURE — 36415 COLL VENOUS BLD VENIPUNCTURE: CPT | Performed by: EMERGENCY MEDICINE

## 2024-12-16 PROCEDURE — 258N000003 HC RX IP 258 OP 636: Performed by: INTERNAL MEDICINE

## 2024-12-16 PROCEDURE — 99285 EMERGENCY DEPT VISIT HI MDM: CPT | Mod: 25

## 2024-12-16 PROCEDURE — 96376 TX/PRO/DX INJ SAME DRUG ADON: CPT

## 2024-12-16 PROCEDURE — 87486 CHLMYD PNEUM DNA AMP PROBE: CPT | Performed by: INTERNAL MEDICINE

## 2024-12-16 PROCEDURE — 250N000013 HC RX MED GY IP 250 OP 250 PS 637: Performed by: INTERNAL MEDICINE

## 2024-12-16 PROCEDURE — 87086 URINE CULTURE/COLONY COUNT: CPT | Performed by: EMERGENCY MEDICINE

## 2024-12-16 PROCEDURE — 84145 PROCALCITONIN (PCT): CPT | Performed by: INTERNAL MEDICINE

## 2024-12-16 PROCEDURE — 250N000011 HC RX IP 250 OP 636: Performed by: INTERNAL MEDICINE

## 2024-12-16 PROCEDURE — 85004 AUTOMATED DIFF WBC COUNT: CPT | Performed by: EMERGENCY MEDICINE

## 2024-12-16 PROCEDURE — 83735 ASSAY OF MAGNESIUM: CPT | Performed by: INTERNAL MEDICINE

## 2024-12-16 PROCEDURE — 87899 AGENT NOS ASSAY W/OPTIC: CPT | Performed by: INTERNAL MEDICINE

## 2024-12-16 PROCEDURE — 83036 HEMOGLOBIN GLYCOSYLATED A1C: CPT | Performed by: INTERNAL MEDICINE

## 2024-12-16 PROCEDURE — 80053 COMPREHEN METABOLIC PANEL: CPT | Performed by: EMERGENCY MEDICINE

## 2024-12-16 RX ORDER — LIDOCAINE 40 MG/G
CREAM TOPICAL
Status: DISCONTINUED | OUTPATIENT
Start: 2024-12-16 | End: 2024-12-17 | Stop reason: HOSPADM

## 2024-12-16 RX ORDER — ONDANSETRON 2 MG/ML
4 INJECTION INTRAMUSCULAR; INTRAVENOUS ONCE
Status: COMPLETED | OUTPATIENT
Start: 2024-12-16 | End: 2024-12-16

## 2024-12-16 RX ORDER — NALOXONE HYDROCHLORIDE 0.4 MG/ML
0.4 INJECTION, SOLUTION INTRAMUSCULAR; INTRAVENOUS; SUBCUTANEOUS
Status: DISCONTINUED | OUTPATIENT
Start: 2024-12-16 | End: 2024-12-17 | Stop reason: HOSPADM

## 2024-12-16 RX ORDER — HYDROMORPHONE HYDROCHLORIDE 1 MG/ML
0.5 INJECTION, SOLUTION INTRAMUSCULAR; INTRAVENOUS; SUBCUTANEOUS
Status: DISCONTINUED | OUTPATIENT
Start: 2024-12-16 | End: 2024-12-17 | Stop reason: HOSPADM

## 2024-12-16 RX ORDER — AMOXICILLIN 250 MG
2 CAPSULE ORAL 2 TIMES DAILY PRN
Status: DISCONTINUED | OUTPATIENT
Start: 2024-12-16 | End: 2024-12-17 | Stop reason: HOSPADM

## 2024-12-16 RX ORDER — TAMSULOSIN HYDROCHLORIDE 0.4 MG/1
0.4 CAPSULE ORAL DAILY
Status: DISCONTINUED | OUTPATIENT
Start: 2024-12-16 | End: 2024-12-17 | Stop reason: HOSPADM

## 2024-12-16 RX ORDER — IOPAMIDOL 755 MG/ML
90 INJECTION, SOLUTION INTRAVASCULAR ONCE
Status: COMPLETED | OUTPATIENT
Start: 2024-12-16 | End: 2024-12-16

## 2024-12-16 RX ORDER — CALCIUM CARBONATE 500 MG/1
1000 TABLET, CHEWABLE ORAL 4 TIMES DAILY PRN
Status: DISCONTINUED | OUTPATIENT
Start: 2024-12-16 | End: 2024-12-17 | Stop reason: HOSPADM

## 2024-12-16 RX ORDER — ACETAMINOPHEN 325 MG/1
650 TABLET ORAL EVERY 4 HOURS PRN
Status: DISCONTINUED | OUTPATIENT
Start: 2024-12-16 | End: 2024-12-17 | Stop reason: HOSPADM

## 2024-12-16 RX ORDER — HYDROMORPHONE HYDROCHLORIDE 1 MG/ML
0.5 INJECTION, SOLUTION INTRAMUSCULAR; INTRAVENOUS; SUBCUTANEOUS EVERY 30 MIN PRN
Status: DISCONTINUED | OUTPATIENT
Start: 2024-12-16 | End: 2024-12-16

## 2024-12-16 RX ORDER — AMOXICILLIN 250 MG
1 CAPSULE ORAL 2 TIMES DAILY PRN
Status: DISCONTINUED | OUTPATIENT
Start: 2024-12-16 | End: 2024-12-17 | Stop reason: HOSPADM

## 2024-12-16 RX ORDER — NALOXONE HYDROCHLORIDE 0.4 MG/ML
0.2 INJECTION, SOLUTION INTRAMUSCULAR; INTRAVENOUS; SUBCUTANEOUS
Status: DISCONTINUED | OUTPATIENT
Start: 2024-12-16 | End: 2024-12-17 | Stop reason: HOSPADM

## 2024-12-16 RX ORDER — ONDANSETRON 2 MG/ML
4 INJECTION INTRAMUSCULAR; INTRAVENOUS EVERY 6 HOURS PRN
Status: DISCONTINUED | OUTPATIENT
Start: 2024-12-16 | End: 2024-12-17 | Stop reason: HOSPADM

## 2024-12-16 RX ORDER — CEFTRIAXONE 1 G/1
1 INJECTION, POWDER, FOR SOLUTION INTRAMUSCULAR; INTRAVENOUS ONCE
Status: COMPLETED | OUTPATIENT
Start: 2024-12-16 | End: 2024-12-16

## 2024-12-16 RX ORDER — CEFTRIAXONE 2 G/1
2 INJECTION, POWDER, FOR SOLUTION INTRAMUSCULAR; INTRAVENOUS EVERY 24 HOURS
Status: DISCONTINUED | OUTPATIENT
Start: 2024-12-17 | End: 2024-12-17 | Stop reason: HOSPADM

## 2024-12-16 RX ORDER — KETOROLAC TROMETHAMINE 30 MG/ML
30 INJECTION, SOLUTION INTRAMUSCULAR; INTRAVENOUS EVERY 6 HOURS PRN
Status: DISCONTINUED | OUTPATIENT
Start: 2024-12-16 | End: 2024-12-17 | Stop reason: HOSPADM

## 2024-12-16 RX ORDER — OXYCODONE HYDROCHLORIDE 5 MG/1
5 TABLET ORAL EVERY 4 HOURS PRN
Status: DISCONTINUED | OUTPATIENT
Start: 2024-12-16 | End: 2024-12-17 | Stop reason: HOSPADM

## 2024-12-16 RX ORDER — ALBUTEROL SULFATE 0.83 MG/ML
3 SOLUTION RESPIRATORY (INHALATION)
Status: DISCONTINUED | OUTPATIENT
Start: 2024-12-16 | End: 2024-12-17 | Stop reason: HOSPADM

## 2024-12-16 RX ORDER — MORPHINE SULFATE 4 MG/ML
4 INJECTION, SOLUTION INTRAMUSCULAR; INTRAVENOUS EVERY 30 MIN PRN
Status: COMPLETED | OUTPATIENT
Start: 2024-12-16 | End: 2024-12-16

## 2024-12-16 RX ORDER — POLYETHYLENE GLYCOL 3350 17 G/17G
17 POWDER, FOR SOLUTION ORAL 2 TIMES DAILY
Status: DISCONTINUED | OUTPATIENT
Start: 2024-12-17 | End: 2024-12-17 | Stop reason: HOSPADM

## 2024-12-16 RX ORDER — ACETAMINOPHEN 650 MG/1
650 SUPPOSITORY RECTAL EVERY 4 HOURS PRN
Status: DISCONTINUED | OUTPATIENT
Start: 2024-12-16 | End: 2024-12-17 | Stop reason: HOSPADM

## 2024-12-16 RX ORDER — PROCHLORPERAZINE MALEATE 10 MG
10 TABLET ORAL EVERY 6 HOURS PRN
Status: DISCONTINUED | OUTPATIENT
Start: 2024-12-16 | End: 2024-12-17 | Stop reason: HOSPADM

## 2024-12-16 RX ORDER — GUAIFENESIN 600 MG/1
1200 TABLET, EXTENDED RELEASE ORAL 2 TIMES DAILY
Status: DISCONTINUED | OUTPATIENT
Start: 2024-12-16 | End: 2024-12-17 | Stop reason: HOSPADM

## 2024-12-16 RX ORDER — ONDANSETRON 4 MG/1
4 TABLET, ORALLY DISINTEGRATING ORAL EVERY 6 HOURS PRN
Status: DISCONTINUED | OUTPATIENT
Start: 2024-12-16 | End: 2024-12-17 | Stop reason: HOSPADM

## 2024-12-16 RX ADMIN — PROCHLORPERAZINE EDISYLATE 10 MG: 5 INJECTION INTRAMUSCULAR; INTRAVENOUS at 15:06

## 2024-12-16 RX ADMIN — ONDANSETRON 4 MG: 2 INJECTION INTRAMUSCULAR; INTRAVENOUS at 13:04

## 2024-12-16 RX ADMIN — IOPAMIDOL 90 ML: 755 INJECTION, SOLUTION INTRAVENOUS at 13:04

## 2024-12-16 RX ADMIN — MORPHINE SULFATE 4 MG: 4 INJECTION, SOLUTION INTRAMUSCULAR; INTRAVENOUS at 12:47

## 2024-12-16 RX ADMIN — HYDROMORPHONE HYDROCHLORIDE 0.5 MG: 1 INJECTION, SOLUTION INTRAMUSCULAR; INTRAVENOUS; SUBCUTANEOUS at 13:04

## 2024-12-16 RX ADMIN — AZITHROMYCIN MONOHYDRATE 500 MG: 500 INJECTION, POWDER, LYOPHILIZED, FOR SOLUTION INTRAVENOUS at 14:15

## 2024-12-16 RX ADMIN — MORPHINE SULFATE 4 MG: 4 INJECTION, SOLUTION INTRAMUSCULAR; INTRAVENOUS at 10:52

## 2024-12-16 RX ADMIN — GUAIFENESIN 1200 MG: 600 TABLET ORAL at 20:40

## 2024-12-16 RX ADMIN — CEFTRIAXONE SODIUM 1 G: 1 INJECTION, POWDER, FOR SOLUTION INTRAMUSCULAR; INTRAVENOUS at 13:50

## 2024-12-16 RX ADMIN — ONDANSETRON 4 MG: 2 INJECTION INTRAMUSCULAR; INTRAVENOUS at 10:52

## 2024-12-16 RX ADMIN — SODIUM CHLORIDE 1888 ML: 9 INJECTION, SOLUTION INTRAVENOUS at 14:20

## 2024-12-16 RX ADMIN — MORPHINE SULFATE 4 MG: 4 INJECTION, SOLUTION INTRAMUSCULAR; INTRAVENOUS at 11:22

## 2024-12-16 ASSESSMENT — ACTIVITIES OF DAILY LIVING (ADL)
ADLS_ACUITY_SCORE: 44
ADLS_ACUITY_SCORE: 18
ADLS_ACUITY_SCORE: 44
ADLS_ACUITY_SCORE: 18
ADLS_ACUITY_SCORE: 44
ADLS_ACUITY_SCORE: 18
ADLS_ACUITY_SCORE: 18
ADLS_ACUITY_SCORE: 44

## 2024-12-16 ASSESSMENT — ENCOUNTER SYMPTOMS
COUGH: 1
SHORTNESS OF BREATH: 1
ABDOMINAL PAIN: 1

## 2024-12-16 ASSESSMENT — COLUMBIA-SUICIDE SEVERITY RATING SCALE - C-SSRS
2. HAVE YOU ACTUALLY HAD ANY THOUGHTS OF KILLING YOURSELF IN THE PAST MONTH?: NO
6. HAVE YOU EVER DONE ANYTHING, STARTED TO DO ANYTHING, OR PREPARED TO DO ANYTHING TO END YOUR LIFE?: NO
1. IN THE PAST MONTH, HAVE YOU WISHED YOU WERE DEAD OR WISHED YOU COULD GO TO SLEEP AND NOT WAKE UP?: NO

## 2024-12-16 NOTE — H&P
"[facility]    History and Physical - Hospitalist Service       Date of Admission:  12/16/2024    Assessment & Plan      Joseph Luevano is a 29 year old female with PMH of C-sections X3, acute appendicitis, obesity, marijuana use,   admitted on 12/16/2024 with:    #Community-acquired pneumonia, with LLL infiltrate.  #Sepsis due to pneumonia, WBCs 13, lactate 2.4, tachycardia.  CT suggestive of endobronchial secretions.  Patient had only single episode of emesis in ER, after EGD.  Denies PTA emesis.  No history of dysphagia.  - Erythromycin/azithromycin  - 20 mL/KG NS bolus  - Symptomatic treatment with expectorant, antipyretics, antiemetics  - Follow respiratory panel, BC  - Monitor for signs of septic shock, so far none seen    #Renal colic on the right  #4 mm proximal ureter stone, with mild hydronephrosis  #UTI  - Ceftriaxone as above  - Seen by urology, no plans for OR  - Flomax 0.4 mg nightly per urology    #Hepatic steatosis, likely NUNEZ.  No history of EtOH.  Patient with obesity,Body mass index is 37.74 kg/m .  Check normal A1c at 5.3.  -A.m. LFT, normal LFTs on admission  - Check PETH  - Outpatient GI evaluation    Diet:  Regular  DVT Prophylaxis: Enoxaparin (Lovenox) SQ  Hernandez Catheter: Not present  Lines: None     Cardiac Monitoring: None  Code Status:  full    Clinically Significant Risk Factors Present on Admission          # Hyperchloremia: Highest Cl = 108 mmol/L in last 2 days, will monitor as appropriate    # Obesity: Estimated body mass index is 38.08 kg/m  as calculated from the following:    Height as of this encounter: 1.575 m (5' 2\").    Weight as of this encounter: 94.4 kg (208 lb 3.2 oz).           Disposition Plan     Medically Ready for Discharge: Anticipated in 2-4 Days    Bev Lim MD  Hospitalist Service  [facility]  Securely message with [employer] (more info)  Text page via [employer] Paging/Directory "   ______________________________________________________________________    Chief Complaint   Right flank pain, cough, vomiting    History is obtained from the patient, electronic health record, and emergency department physician    History of Present Illness   Joseph Leuvano is a 29 year old female with PMH of C-sections X3, acute appendicitis, obesity, marijuana use, presented to ED with above-mentioned symptoms.  Onset of symptoms few days ago.  She was seen at , was given prescription for azithromycin and Augmentin for pneumonia.  Today patient had onset of right upper quadrant/right flank pain, without associated dysuria, hematuria, urinary frequency or malodorous urine.  She denies hemoptysis, chest pain, cardiac operations.  She had 1 episode of vomiting in ER.  Workup in ER-CT chest/abdomen/pelvis showed L pneumonia with endobronchial secretion suggestive of aspiration, infected urine, hepatic steatosis, 4 mm proximal ureteral stone with mild upstream dilatation.  S/p azithromycin and ceftriaxone for CAP given in ER.  S/p NS bolus.  Patient was evaluated by urology, no plans for procedures, started on Flomax.    Past Medical History    Past Medical History:   Diagnosis Date    Abnormal fetal ultrasound 2018 = BPP 2/8    Abnormal glucose tolerance in pregnancy 3/20/2018    1hr *. 3hr GTT 76, 152, 108, 101.    Acute appendicitis 2019    Appendicitis 2019    Added automatically from request for surgery [id number]    Bacteriuria in pregnancy 2019    Congenital heart defect 2017    Normal echocardiogram. No follow up needed, per cardiology (see echo report).    CPD (cephalo-pelvic disproportion) 12/3/2019    Delivery by  section of full-term infant 2018    Hepatic steatosis 2024    Hx of  section complicating pregnancy 2019    Low back pain during pregnancy in third trimester 10/1/2019    Marijuana abuse in remission 2018    Stopped when  she became pregnant.    Oligohydramnios 2018    Oligohydramnios in third trimester, single or unspecified fetus     PIH (pregnancy induced hypertension) 2022    Post-term pregnancy, 40-42 weeks of gestation 2018    41+ weeks    Postpartum depression     Retrocecal appendix     S/P repeat low transverse  2022    Urinary tract infection 2024     Past Surgical History   Past Surgical History:   Procedure Laterality Date    APPENDECTOMY      C/SECTION, LOW TRANSVERSE      CARDIAC SURGERY      as a child     SECTION N/A 2018    Procedure:  SECTION;  Surgeon: Ming Potter MD;  Location: [facility] OR;  Service:      SECTION N/A 12/3/2019    Procedure:  SECTION, REPEAT;  Surgeon: Ming Potter MD;  Location: [facility] OR;  Service: Obstetrics     SECTION N/A 2022    Procedure: REPEAT  SECTION;  Surgeon: Ming Potter MD;  Location: [facility]    LAPAROSCOPIC CHOLECYSTECTOMY N/A 2018    Procedure: CHOLECYSTECTOMY, LAPAROSCOPIC;  Surgeon: Rafi Newsome MD;  Location: [facility];  Service:     CO ERCP W/SPHINCTEROTOMY/PAPILLOTOMY N/A 2018    Procedure: ENDOSCOPIC RETROGRADE CHOLANGIOPANCREATOGRAPHY, SPHINCTEROTOMY; PANCREATIC DUCT STENT PLACEMENT;  Surgeon: Malik Edwards MD;  Location: [facility];  Service: Gastroenterology    CO LAP,APPENDECTOMY N/A 2019    Procedure: APPENDECTOMY, LAPAROSCOPIC;  Surgeon: Devon Ellison MD;  Location: [facility];  Service: General     Prior to Admission Medications   Prior to Admission Medications   Prescriptions Last Dose Informant Patient Reported? Taking?   acetaminophen (TYLENOL) 500 MG tablet   No No   Sig: Take 1-2 tablets (500-1,000 mg) by mouth every 6 hours as needed   albuterol (PROAIR HFA/PROVENTIL HFA/VENTOLIN HFA) 108 (90 Base) MCG/ACT inhaler   No No   Sig: Inhale 2 puffs into the lungs every 6 hours.    amoxicillin-clavulanate (AUGMENTIN) 875-125 MG tablet   No No   Sig: Take 1 tablet by mouth 2 times daily for 5 days.   azithromycin (ZITHROMAX) 500 MG tablet   No No   Sig: Take 1 tablet (500 mg) by mouth daily for 5 days.   hydrOXYzine HCL (ATARAX) 10 MG tablet   No No   Sig: [HYDROXYZINE HCL (ATARAX) 10 MG TABLET] Take 1 tablet (10 mg total) by mouth 3 (three) times a day as needed for anxiety.   ibuprofen (ADVIL/MOTRIN) 400 MG tablet   Yes No   ibuprofen (ADVIL/MOTRIN) 800 MG tablet   No No   Sig: Take 1 tablet (800 mg) by mouth every 6 hours      Facility-Administered Medications: None     Review of Systems    The 10 point Review of Systems is negative other than noted in the HPI.    Social History   I have reviewed this patient's social history and updated it with pertinent information if needed.  Social History     Tobacco Use    Smoking status: Former     Types: Other     Quit date: 2024     Years since quittin.1     Passive exposure: Current (boyfriend smokes around and outside)    Smokeless tobacco: Never   Vaping Use    Vaping status: Never Used   Substance Use Topics    Alcohol use: No    Drug use: No     Comment: Drug use: used to smoke marijuana     Family History   I have reviewed this patient's family history and updated it with pertinent information if needed.  Family History   Problem Relation Age of Onset    Diabetes Mother     Hypertension Mother     No Known Problems Father     Diabetes Sister     No Known Problems Brother     No Known Problems Sister     No Known Problems Brother     No Known Problems Brother      Allergies   Allergies   Allergen Reactions    Norelgestromin-Eth Estradiol Rash     From adhesive       Physical Exam   Vital Signs: Temp: 97  F (36.1  C) Temp src: Oral BP: 124/72 Pulse: 87   Resp: (!) 36 SpO2: 99 % O2 Device: None (Room air)    Weight: 208 lbs 3.2 oz  General: Alert and oriented x 3. Not in obvious distress.  HEENT: NC, AT. Neck- supple, No JVP elevation,  lymphadenopathy or thyromegaly. Trachea-central.  Chest: Clear to auscultation bilaterally.  Heart: S1S2 regular. No M/R/G.  Abdomen: Soft.  Tender in right upper quadrant, right flank, no rebound tenderness.  Audible bowel sounds.  Back: No spine tenderness. No CVA tenderness.  Extremities: No leg swelling. Peripheral pulses 2+ bilaterally.  Neuro: Cranial nerves 1-12 grossly normal. No focal neurological deficit    Medical Decision Making       76 MINUTES SPENT BY ME on the date of service doing chart review, history, exam, documentation & further activities per the note.      Data     I have personally reviewed the following data over the past 24 hrs:    13.2 (H)  \   14.3   / 283     141 108 (H) 9.4 /  117 (H)   4.3 19 (L) 0.77 \     ALT: 43 AST: 38 AP: 72 TBILI: 0.6   ALB: 4.3 TOT PROTEIN: 7.7 LIPASE: 47     TSH: N/A T4: N/A A1C: 5.3     Procal: 0.02 CRP: N/A Lactic Acid: 2.4 (H)     Imaging results reviewed over the past 24 hrs:   Recent Results (from the past 24 hours)   CTA Chest Abdomen Pelvis w Contrast    Narrative    EXAM: CTA CHEST ABDOMEN PELVIS W CONTRAST  LOCATION: [facility]  DATE: 12/16/2024  INDICATION: Abdominal pain plus shortness of breath, recent pneumonia diagnosis, evaluate for PE, chest pathology, abdominal pain.  COMPARISON: None.  TECHNIQUE: CT angiogram chest abdomen pelvis during arterial phase of injection of IV contrast. 2D and 3D MIP reconstructions were performed by the CT technologist. Dose reduction techniques were used.   CONTRAST: IsoVue 370 90mL  FINDINGS:   CT ANGIOGRAM CHEST, ABDOMEN, AND PELVIS: Evaluation of the aortic root limited by cardiac motion. Normal caliber aorta with no evidence of aneurysm, dissection, or significant narrowing. Pulmonary arteries are well opacified and no evidence of pulmonary   emboli.  LUNGS AND PLEURA: Endobronchial opacities and subpleural consolidation in the left lower lobe. No pleural effusion or  pneumothorax.  MEDIASTINUM/AXILLAE: Normal.  CORONARY ARTERY CALCIFICATION: None.  HEPATOBILIARY: Morphologic changes of cirrhosis with probable steatosis. Cholecystectomy.  PANCREAS: Normal.  SPLEEN: Normal.  ADRENAL GLANDS: Normal.  KIDNEYS/BLADDER: 0.4 x 0.3 x 0.4 cm calculus in the proximal right ureter with mild upstream collecting system dilation and periureteral fat stranding.  BOWEL: Normal.  LYMPH NODES: Normal.  PELVIC ORGANS: Normal.  MUSCULOSKELETAL: Normal.      Impression    IMPRESSION:  1.  No evidence of acute aortic syndrome or pulmonary emboli.  2.  Subpleural consolidation in the left lower lobe suggesting pneumonia. Associated endobronchial opacities could be retained secretions or aspiration.  3.  Small 0.4 cm obstructing calculus in the proximal right ureter with mild upstream dilation.  4.  Morphologic changes of cirrhosis with probable steatosis. Recommend hepatology consultation.     This document is created with the help of Dragon dictation system. All grammatical errors are unintentional.

## 2024-12-16 NOTE — CONSULTS
[facility] Consult     Name: Joseph Luevano    MRN: [id number]   YOB: 1995       We were asked to see Joseph Luevano in consultation at the request of Dr. Lim for evaluation and treatment of the following chief complaint.          Chief Complaint:   Ureteral stone  History is obtained from pt and EMR.          History of Present Illness:   Joseph Luevano is a 29 year old female with newly diagnosed right proximal ureteral stone, 0.4 mm with mild hydronephrosis. She is being admitted for pneumonia with incidental finding of stone. The patient does have history of UTI but today denies any dysuria, urgency frequency. Notes some right flank pain that has worsened over the past day or so. No history of family history of stones. Had one episode of emesis, otherwise no nausea. No fever. No other concerns or complaints.          Past Medical History:     Past Medical History:   Diagnosis Date    Abnormal fetal ultrasound 2018 = BPP 2/8    Abnormal glucose tolerance in pregnancy 3/20/2018    1hr *. 3hr GTT 76, 152, 108, 101.    Acute appendicitis 2019    Appendicitis 2019    Added automatically from request for surgery [id number]    Bacteriuria in pregnancy 2019    Congenital heart defect 2017    Normal echocardiogram. No follow up needed, per cardiology (see echo report).    CPD (cephalo-pelvic disproportion) 12/3/2019    Delivery by  section of full-term infant 2018    Hepatic steatosis 2024    Hx of  section complicating pregnancy 2019    Low back pain during pregnancy in third trimester 10/1/2019    Marijuana abuse in remission 2018    Stopped when she became pregnant.    Oligohydramnios 2018    Oligohydramnios in third trimester, single or unspecified fetus     PIH (pregnancy induced hypertension) 2022    Post-term pregnancy, 40-42 weeks of gestation 2018    41+ weeks    Postpartum depression     Retrocecal appendix      S/P repeat low transverse  2022    Urinary tract infection 2024            Past Surgical History:     Past Surgical History:   Procedure Laterality Date    APPENDECTOMY      C/SECTION, LOW TRANSVERSE      CARDIAC SURGERY      as a child     SECTION N/A 2018    Procedure:  SECTION;  Surgeon: Ming Potter MD;  Location: [facility] OR;  Service:      SECTION N/A 12/3/2019    Procedure:  SECTION, REPEAT;  Surgeon: Ming Potter MD;  Location: [facility] OR;  Service: Obstetrics     SECTION N/A 2022    Procedure: REPEAT  SECTION;  Surgeon: Ming Potter MD;  Location: [facility]    LAPAROSCOPIC CHOLECYSTECTOMY N/A 2018    Procedure: CHOLECYSTECTOMY, LAPAROSCOPIC;  Surgeon: Rafi Newsome MD;  Location: [facility];  Service:     LA ERCP W/SPHINCTEROTOMY/PAPILLOTOMY N/A 2018    Procedure: ENDOSCOPIC RETROGRADE CHOLANGIOPANCREATOGRAPHY, SPHINCTEROTOMY; PANCREATIC DUCT STENT PLACEMENT;  Surgeon: Malik Edwards MD;  Location: [facility];  Service: Gastroenterology    LA LAP,APPENDECTOMY N/A 2019    Procedure: APPENDECTOMY, LAPAROSCOPIC;  Surgeon: Devon Ellison MD;  Location: [facility];  Service: General            Social History:     Social History     Tobacco Use    Smoking status: Former     Types: Other     Quit date: 2024     Years since quittin.1     Passive exposure: Current (boyfriend smokes around and outside)    Smokeless tobacco: Never   Substance Use Topics    Alcohol use: No            Family History:     Family History   Problem Relation Age of Onset    Diabetes Mother     Hypertension Mother     No Known Problems Father     Diabetes Sister     No Known Problems Brother     No Known Problems Sister     No Known Problems Brother     No Known Problems Brother             Allergies:     Allergies   Allergen Reactions    Norelgestromin-Eth Estradiol Rash      From adhesive             Medications:     Current Facility-Administered Medications   Medication Dose Route Frequency Provider Last Rate Last Admin    acetaminophen (TYLENOL) tablet 650 mg  650 mg Oral Q4H PRN Bev Lim MD        Or    acetaminophen (TYLENOL) Suppository 650 mg  650 mg Rectal Q4H PRN Bev Lim MD        albuterol (PROVENTIL) neb solution 2.5 mg  3 mL Nebulization Q2H PRN Bev Lim MD        [START ON 12/17/2024] azithromycin (ZITHROMAX) 250 mg in sodium chloride 0.9 % 250 mL intermittent infusion  250 mg Intravenous Q24H Bev Lim MD        calcium carbonate (TUMS) chewable tablet 1,000 mg  1,000 mg Oral 4x Daily PRN Bev Lim MD        [START ON 12/17/2024] cefTRIAXone (ROCEPHIN) 2 g vial to attach to  ml bag for ADULTS or NS 50 ml bag for PEDS  2 g Intravenous Q24H Bev Lim MD        guaiFENesin (MUCINEX) 12 hr tablet 1,200 mg  1,200 mg Oral BID Bev Lim MD        HYDROmorphone (DILAUDID) injection 1 mg  1 mg Intravenous Q2H PRN Bev Lim MD        HYDROmorphone (PF) (DILAUDID) injection 0.5 mg  0.5 mg Intravenous Q30 Min PRN Kevin Marie MD   0.5 mg at 12/16/24 1304    HYDROmorphone (PF) (DILAUDID) injection 0.5 mg  0.5 mg Intravenous Q2H PRN Bev Lim MD        ketorolac (TORADOL) injection 30 mg  30 mg Intravenous Q6H PRN Bev Lim MD        lidocaine (LMX4) cream   Topical Q1H PRN Bev Lim MD        lidocaine 1 % 0.1-1 mL  0.1-1 mL Other Q1H PRN Bev Lim MD        naloxone (NARCAN) injection 0.2 mg  0.2 mg Intravenous Q2 Min PRN Bev Lim MD        Or    naloxone (NARCAN) injection 0.4 mg  0.4 mg Intravenous Q2 Min PRN Bev Lim MD        Or    naloxone (NARCAN) injection 0.2 mg  0.2 mg Intramuscular Q2 Min PRN Bev Lim MD        Or    naloxone (NARCAN) injection 0.4 mg  0.4 mg Intramuscular Q2 Min PRN Bev Lim MD         ondansetron (ZOFRAN ODT) ODT tab 4 mg  4 mg Oral Q6H PRN Bev Lim MD        Or    ondansetron (ZOFRAN) injection 4 mg  4 mg Intravenous Q6H PRN Bev Lim MD        oxyCODONE (ROXICODONE) tablet 5 mg  5 mg Oral Q4H PRN Bev Lim MD        oxyCODONE IR (ROXICODONE) half-tab 2.5 mg  2.5 mg Oral Q4H PRN Bev Lim MD        [START ON 12/17/2024] polyethylene glycol (MIRALAX) Packet 17 g  17 g Oral BID Bev Lim MD        prochlorperazine (COMPAZINE) injection 10 mg  10 mg Intravenous Q6H PRN Bev Lim MD   10 mg at 12/16/24 1506    Or    prochlorperazine (COMPAZINE) tablet 10 mg  10 mg Oral Q6H PRN Bev Lim MD        senerin-docusate (SENOKOT-S/PERICOLACE) 8.6-50 MG per tablet 1 tablet  1 tablet Oral BID PRN Bev Lim MD        Or    senna-docusate (SENOKOT-S/PERICOLACE) 8.6-50 MG per tablet 2 tablet  2 tablet Oral BID PRN Bev Lim MD        sodium chloride (PF) 0.9% PF flush 3 mL  3 mL Intracatheter Q8H Bev Lim MD   3 mL at 12/16/24 1416    sodium chloride (PF) 0.9% PF flush 3 mL  3 mL Intracatheter q1 min prn Bev Lim MD        sodium chloride 0.9% BOLUS 1,888 mL  20 mL/kg Intravenous Once Bev Lim  mL/hr at 12/16/24 1420 1,888 mL at 12/16/24 1420     Current Outpatient Medications   Medication Sig Dispense Refill    albuterol (PROAIR HFA/PROVENTIL HFA/VENTOLIN HFA) 108 (90 Base) MCG/ACT inhaler Inhale 2 puffs into the lungs every 6 hours. 18 g 0             Review of Systems:    ROS: 14-point ROS negative other than that noted in the HPI.          Physical Exam:   VS:  T: 97    HR: 87    BP: 124/72    RR: 36   PSYCH: NAD  EYES: EOMI  MOUTH: MMM  NECK: Supple, no notable adenopathy  RESP: Unlabored breathing  SKIN: Warm, no rashes  ABD: soft, Nontender, no CVA tenderness bilaterally  NEURO: AAO x3          Data:   All laboratory data reviewed:    Recent Labs   Lab 12/16/24  1042  12/11/24  1025   WBC 13.2* 8.2   HGB 14.3 13.8    263       Recent Labs   Lab 12/16/24  1042 12/11/24  1025    138   POTASSIUM 4.3 4.1   CHLORIDE 108* 105   CO2 19* 23   BUN 9.4 8.9   CR 0.77 0.76   * 125*   JUANITA 9.5 8.8       Recent Labs   Lab 12/16/24  1316   COLOR Orange*   APPEARANCE Turbid*   URINEGLC Negative   URINEBILI Negative   URINEKETONE 40*   SG 1.010   URINEPH 7.0   PROTEIN 30*   NITRITE Negative   LEUKEST 75 Candy/uL*   RBCU >182*   WBCU 21*     Results for orders placed or performed during the hospital encounter of 12/16/24   CTA Chest Abdomen Pelvis w Contrast    Narrative    EXAM: CTA CHEST ABDOMEN PELVIS W CONTRAST  LOCATION: [facility]  DATE: 12/16/2024    INDICATION: Abdominal pain plus shortness of breath, recent pneumonia diagnosis, evaluate for PE, chest pathology, abdominal pain.  COMPARISON: None.  TECHNIQUE: CT angiogram chest abdomen pelvis during arterial phase of injection of IV contrast. 2D and 3D MIP reconstructions were performed by the CT technologist. Dose reduction techniques were used.   CONTRAST: IsoVue 370 90mL    FINDINGS:   CT ANGIOGRAM CHEST, ABDOMEN, AND PELVIS: Evaluation of the aortic root limited by cardiac motion. Normal caliber aorta with no evidence of aneurysm, dissection, or significant narrowing. Pulmonary arteries are well opacified and no evidence of pulmonary   emboli.    LUNGS AND PLEURA: Endobronchial opacities and subpleural consolidation in the left lower lobe. No pleural effusion or pneumothorax.    MEDIASTINUM/AXILLAE: Normal.    CORONARY ARTERY CALCIFICATION: None.    HEPATOBILIARY: Morphologic changes of cirrhosis with probable steatosis. Cholecystectomy.    PANCREAS: Normal.    SPLEEN: Normal.    ADRENAL GLANDS: Normal.    KIDNEYS/BLADDER: 0.4 x 0.3 x 0.4 cm calculus in the proximal right ureter with mild upstream collecting system dilation and periureteral fat stranding.    BOWEL: Normal.    LYMPH NODES:  Normal.    PELVIC ORGANS: Normal.    MUSCULOSKELETAL: Normal.      Impression    IMPRESSION:  1.  No evidence of acute aortic syndrome or pulmonary emboli.  2.  Subpleural consolidation in the left lower lobe suggesting pneumonia. Associated endobronchial opacities could be retained secretions or aspiration.  3.  Small 0.4 cm obstructing calculus in the proximal right ureter with mild upstream dilation.  4.  Morphologic changes of cirrhosis with probable steatosis. Recommend hepatology consultation.         All pertinent imaging reviewed:  CT scan of the abdomen:   EXAM: CTA CHEST ABDOMEN PELVIS W CONTRAST  LOCATION: [facility]  DATE: 12/16/2024     INDICATION: Abdominal pain plus shortness of breath, recent pneumonia diagnosis, evaluate for PE, chest pathology, abdominal pain.  COMPARISON: None.  TECHNIQUE: CT angiogram chest abdomen pelvis during arterial phase of injection of IV contrast. 2D and 3D MIP reconstructions were performed by the CT technologist. Dose reduction techniques were used.   CONTRAST: IsoVue 370 90mL     FINDINGS:   CT ANGIOGRAM CHEST, ABDOMEN, AND PELVIS: Evaluation of the aortic root limited by cardiac motion. Normal caliber aorta with no evidence of aneurysm, dissection, or significant narrowing. Pulmonary arteries are well opacified and no evidence of pulmonary   emboli.     LUNGS AND PLEURA: Endobronchial opacities and subpleural consolidation in the left lower lobe. No pleural effusion or pneumothorax.     MEDIASTINUM/AXILLAE: Normal.     CORONARY ARTERY CALCIFICATION: None.     HEPATOBILIARY: Morphologic changes of cirrhosis with probable steatosis. Cholecystectomy.     PANCREAS: Normal.     SPLEEN: Normal.     ADRENAL GLANDS: Normal.     KIDNEYS/BLADDER: 0.4 x 0.3 x 0.4 cm calculus in the proximal right ureter with mild upstream collecting system dilation and periureteral fat stranding.     BOWEL: Normal.     LYMPH NODES: Normal.     PELVIC ORGANS: Normal.      MUSCULOSKELETAL: Normal.                                                                      IMPRESSION:  1.  No evidence of acute aortic syndrome or pulmonary emboli.  2.  Subpleural consolidation in the left lower lobe suggesting pneumonia. Associated endobronchial opacities could be retained secretions or aspiration.  3.  Small 0.4 cm obstructing calculus in the proximal right ureter with mild upstream dilation.  4.  Morphologic changes of cirrhosis with probable steatosis. Recommend hepatology consultation.            Impression and Plan:   Impression:   Joseph Luevano is a 29 year old female with right proximal ureteral stone. History of UTI. No history of stone. Presented to the ED with pneumonia, incidental finding of stone. Cr 0.77. WBC 13.2. Ua with 75 leukocyte esterase, mucous and bacteria seen, but negative nitrites. CT with 4 mm right proximal ureteral stone with mild dilation. Afebrile, hemodynamically stable. Treating with ceftriaxone.        Plan:   -No plan for the OR today as patient appears clinically well, is nontoxic appearing, and urinalysis without overtly positive UA and patient without urinary symptoms. Recommend medically optimizing pneumonia at this time. If patient declines clinically or UC is positive, please reach out to urology team.  -Patient does not need to be NPO  -IVF fluids  -Recommend Flomax 0.4mg QHS - monitor for orthostatic hypotension  -Thank you for involving us in the care of this patient. Will follow up with the patient in an outpatient setting       Discussed with Dr. Avendano who is in agreement with the plan.    Linda Dumont PA-C  [facility]  Office: [phone]  Page: Mare (MW 7:30AM-4PM,  7:30AM-4PM, Tu OFF)

## 2024-12-16 NOTE — ED TRIAGE NOTES
Recently diagnosed with pneumonia. Increased sob and right quad pain. Took ibuprofen without relief. Was prescribed inhaler with the pneumonia and last used it yesterday. Is tachypneic and diaphoretic in triage. Is pale     Triage Assessment (Adult)       Row Name 12/16/24 0939          Triage Assessment    Airway WDL WDL

## 2024-12-16 NOTE — MEDICATION SCRIBE - ADMISSION MEDICATION HISTORY
Medication Scribe Admission Medication History    Admission medication history is complete. The information provided in this note is only as accurate as the sources available at the time of the update.    Information Source(s): Patient via in-person    Pertinent Information: patient reports self management of medications.     Augmentin/Zithromax: patient reports completion of 5 day courses yesterday.     Patient reports no longer taking: Tylenol, Atarax, Advil     Changes made to PTA medication list:  Added: None  Deleted: Tylenol, Atarax, Advil, Augmentin/Zithromax:  Changed: None    Allergies reviewed with patient and updates made in EHR: yes    Medication History Completed By: Jerry Pascal 12/16/2024 2:02 PM    PTA Med List   Medication Sig Last Dose/Taking    albuterol (PROAIR HFA/PROVENTIL HFA/VENTOLIN HFA) 108 (90 Base) MCG/ACT inhaler Inhale 2 puffs into the lungs every 6 hours. Taking

## 2024-12-16 NOTE — ED NOTES
Patient states she feels like she cannot catch her breath. Lung sounds clear bilaterally in all lobes. Patient states she also has abdominal pain in right lower abdomen. No rebound tenderness.

## 2024-12-16 NOTE — ED PROVIDER NOTES
EMERGENCY DEPARTMENT ENCOUNTER      NAME: Joseph Luevano  AGE: 29 year old female  YOB: 1995  MRN: [id number]  EVALUATION DATE & TIME: 12/16/2024  9:41 AM    PCP: [facility]    ED PROVIDER: Kevin Marie M.D.      Chief Complaint   Patient presents with    Shortness of Breath         FINAL IMPRESSION:  1.  Acute dyspnea.  2.  Acute abdominal pain.  3.  Acute left lower lobe pneumonia.  4.  Acute UTI.  5.  Acute right ureterolithiasis.      ED COURSE & MEDICAL DECISION MAKING:    10:06 AM I met with the patient to gather history and to perform my initial exam. We discussed plans for the ED course, including diagnostic testing and treatment. PPE worn: cloth mask..  Recent diagnosis of pneumonia and placed on Augmentin and azithromycin.  Patient not getting better, continuing to cough, and continued feelings of shortness of breath.  Also right lower quadrant abdominal pain at this time.  Evaluation ordered.  1:43 PM.  Better response for treatment of pain with Dilaudid and morphine for her.  We have started azithromycin Rocephin IV.  She still has a urinary tract infection as well as a right ureter stone and a left lower lobe pneumonia.  Plan admit patient to [facility] inpatient.  Patient in agreement with the plan.  We have paged the admitting service.  1:53 PM.  Hospitalist in agreement with the [facility] inpatient admission.  She asked that we also page the [facility] urologist which was done.  They have not yet called back.  2:10 PM.  Urologist called back and they will follow along with the patient.    Pertinent Labs & Imaging studies reviewed. (See chart for details)  29 year old female presents to the Emergency Department for evaluation of shortness of breath and abdominal pain.    At the conclusion of the encounter I discussed the results of all of the tests and the disposition. The questions were answered. The patient or family acknowledged understanding and was agreeable with the  care plan.              Medical Decision Making  Obtained supplemental history:Supplemental history obtained?: No  Reviewed external records: External records reviewed?: Documented in chart  Care impacted by chronic illness:Other: None  Did you consider but not order tests?: Work up considered but not performed and documented in chart, if applicable  Did you interpret images independently?: Independent interpretation of ECG and images noted in documentation, when applicable.  Consultation discussion with other provider: If admitted, will discuss with the admitting service.  Possible admission to the hospital because of current tachypnea and diaphoresis with recent  pneumonia diagnosis.    MIPS: CT Pulmonary Angiogram:CT PA was ordered for reason(s) other than PE.        MEDICATIONS GIVEN IN THE EMERGENCY:  Medications - No data to display    NEW PRESCRIPTIONS STARTED AT TODAY'S ER VISIT  New Prescriptions    No medications on file          =================================================================    HPI    Patient information was obtained from: the patient    Use of : N/A         Joseph Luevano is a 29 year old female with no pertinent medical history who presents to this ED by walk-in for evaluation of a cough and shortness of breath.     The patient developed a productive cough, abdominal pain and shortness of breath this morning (12/16/2024). She is unsure of what color the phlegm was. The abdominal pain is primarily in her right lower quadrant and radiates up at times. She took ibuprofen with no improvement in her symptoms.     She does not identify any waxing or waning symptoms otherwise, exacerbating or alleviating features, associated symptoms except as mentioned. She denies any pain-related complaints.    Per Chart Review, the patient was seen on 12/11/2024 at [facility] in [place] for evaluation of shortness of breath and a cough. A chest X-ray revealed pneumonia of the  left lower lobe. BMP, CBC, COVID-19 PCR unremarkable. She was started on Amoxicillin.    REVIEW OF SYSTEMS   Review of Systems   Respiratory:  Positive for cough and shortness of breath.    Gastrointestinal:  Positive for abdominal pain.   All other systems reviewed and are negative.       PAST MEDICAL HISTORY:  Past Medical History:   Diagnosis Date    Abnormal fetal ultrasound 2018 = BPP 2/8    Abnormal glucose tolerance in pregnancy 3/20/2018    1hr *. 3hr GTT 76, 152, 108, 101.    Acute appendicitis 2019    Appendicitis 2019    Added automatically from request for surgery [id number]    Bacteriuria in pregnancy 2019    Congenital heart defect 2017    Normal echocardiogram. No follow up needed, per cardiology (see echo report).    CPD (cephalo-pelvic disproportion) 12/3/2019    Delivery by  section of full-term infant 2018    Hx of  section complicating pregnancy 2019    Low back pain during pregnancy in third trimester 10/1/2019    Marijuana abuse in remission 2018    Stopped when she became pregnant.    Oligohydramnios 2018    Oligohydramnios in third trimester, single or unspecified fetus     PIH (pregnancy induced hypertension) 2022    Post-term pregnancy, 40-42 weeks of gestation 2018    41+ weeks    Postpartum depression     Retrocecal appendix     S/P repeat low transverse  2022       PAST SURGICAL HISTORY:  Past Surgical History:   Procedure Laterality Date    APPENDECTOMY      C/SECTION, LOW TRANSVERSE      CARDIAC SURGERY      as a child     SECTION N/A 2018    Procedure:  SECTION;  Surgeon: Ming Potter MD;  Location: [facility] OR;  Service:      SECTION N/A 12/3/2019    Procedure:  SECTION, REPEAT;  Surgeon: Ming Potter MD;  Location: [facility] OR;  Service: Obstetrics     SECTION N/A 2022    Procedure: REPEAT  SECTION;  Surgeon:  Ming Potter MD;  Location: [facility]    LAPAROSCOPIC CHOLECYSTECTOMY N/A 2018    Procedure: CHOLECYSTECTOMY, LAPAROSCOPIC;  Surgeon: Rafi Newsome MD;  Location: [facility];  Service:     HI ERCP W/SPHINCTEROTOMY/PAPILLOTOMY N/A 2018    Procedure: ENDOSCOPIC RETROGRADE CHOLANGIOPANCREATOGRAPHY, SPHINCTEROTOMY; PANCREATIC DUCT STENT PLACEMENT;  Surgeon: Malik Edwards MD;  Location: [facility];  Service: Gastroenterology    HI LAP,APPENDECTOMY N/A 2019    Procedure: APPENDECTOMY, LAPAROSCOPIC;  Surgeon: Devon Ellison MD;  Location: [facility];  Service: General           CURRENT MEDICATIONS:    acetaminophen (TYLENOL) 500 MG tablet  albuterol (PROAIR HFA/PROVENTIL HFA/VENTOLIN HFA) 108 (90 Base) MCG/ACT inhaler  amoxicillin-clavulanate (AUGMENTIN) 875-125 MG tablet  azithromycin (ZITHROMAX) 500 MG tablet  hydrOXYzine HCL (ATARAX) 10 MG tablet  ibuprofen (ADVIL/MOTRIN) 400 MG tablet  ibuprofen (ADVIL/MOTRIN) 800 MG tablet        ALLERGIES:  Allergies   Allergen Reactions    Norelgestromin-Eth Estradiol Rash     From adhesive        FAMILY HISTORY:  Family History   Problem Relation Age of Onset    Diabetes Mother     Hypertension Mother     No Known Problems Father     Diabetes Sister     No Known Problems Brother     No Known Problems Sister     No Known Problems Brother     No Known Problems Brother        SOCIAL HISTORY:   Social History     Socioeconomic History    Marital status: Single   Tobacco Use    Smoking status: Former     Types: Other     Quit date: 2024     Years since quittin.1     Passive exposure: Current (boyfriend smokes around and outside)    Smokeless tobacco: Never   Vaping Use    Vaping status: Never Used   Substance and Sexual Activity    Alcohol use: No    Drug use: No     Comment: Drug use: used to smoke marijuana    Sexual activity: Yes     Partners: Male     Birth control/protection: None     Former tobacco and former  "marijuana.  No current drugs, alcohol, or tobacco.    VITALS:  /81   Pulse 64   Temp 97  F (36.1  C) (Oral)   Resp (!) 36   Ht 1.575 m (5' 2\")   Wt 94.4 kg (208 lb 3.2 oz)   LMP 12/16/2024 (Exact Date)   SpO2 98%   BMI 38.08 kg/m      PHYSICAL EXAM    Vital Signs:  /81   Pulse 64   Temp 97  F (36.1  C) (Oral)   Resp (!) 36   Ht 1.575 m (5' 2\")   Wt 94.4 kg (208 lb 3.2 oz)   LMP 12/16/2024 (Exact Date)   SpO2 98%   BMI 38.08 kg/m    General:  On entering the room she is in no apparent distress.    Neck:  Neck supple with full range of motion and nontender.    Back:  Back and spine are nontender.  No costovertebral angle tenderness.    HEENT:  Oropharynx clear with moist mucous membranes.  HEENT unremarkable.    Pulmonary:  Chest clear to auscultation without rhonchi rales or wheezing.  Tachypnea.  Cardiovascular:  Cardiac regular rate and rhythm without murmurs rubs or gallops.    Abdomen:  Abdomen soft nontender.  Mostly nontender but there is some pain to palpation in the right lower quadrant area.  There is no rebound or guarding.    Muskuloskeletal:  She moves all 4 without any difficulty and has normal neurovascular exams.  Extremities without clubbing, cyanosis, or edema.  Legs and calves are nontender.    Neuro:  She is alert and oriented ×3 and moves all extremities symmetrically.    Psych:  Normal affect.    Skin:  Unremarkable and warm and dry.       LAB:  All pertinent labs reviewed and interpreted.  Labs Ordered and Resulted from Time of ED Arrival to Time of ED Departure   COMPREHENSIVE METABOLIC PANEL - Abnormal       Result Value    Sodium 141      Potassium 4.3      Carbon Dioxide (CO2) 19 (*)     Anion Gap 14      Urea Nitrogen 9.4      Creatinine 0.77      GFR Estimate >90      Calcium 9.5      Chloride 108 (*)     Glucose 117 (*)     Alkaline Phosphatase 72      AST 38      ALT 43      Protein Total 7.7      Albumin 4.3      Bilirubin Total 0.6     ROUTINE UA WITH " MICROSCOPIC REFLEX TO CULTURE - Abnormal    Color Urine Orange (*)     Appearance Urine Turbid (*)     Glucose Urine Negative      Bilirubin Urine Negative      Ketones Urine 40 (*)     Specific Gravity Urine 1.010      Blood Urine >1.0 mg/dL (*)     pH Urine 7.0      Protein Albumin Urine 30 (*)     Urobilinogen Urine <2.0      Nitrite Urine Negative      Leukocyte Esterase Urine 75 Candy/uL (*)     Bacteria Urine Few (*)     Mucus Urine Present (*)     Calcium Oxalate Crystals Urine Few (*)     RBC Urine >182 (*)     WBC Urine 21 (*)    CBC WITH PLATELETS AND DIFFERENTIAL - Abnormal    WBC Count 13.2 (*)     RBC Count 4.56      Hemoglobin 14.3      Hematocrit 42.3      MCV 93      MCH 31.4      MCHC 33.8      RDW 12.0      Platelet Count 283      % Neutrophils 68      % Lymphocytes 23      % Monocytes 5      % Eosinophils 3      % Basophils 1      % Immature Granulocytes 0      NRBCs per 100 WBC 0      Absolute Neutrophils 9.0 (*)     Absolute Lymphocytes 3.1      Absolute Monocytes 0.6      Absolute Eosinophils 0.4      Absolute Basophils 0.1      Absolute Immature Granulocytes 0.0      Absolute NRBCs 0.0     LIPASE - Normal    Lipase 47     HCG QUALITATIVE PREGNANCY - Normal    hCG Serum Qualitative Negative     URINE CULTURE       RADIOLOGY:  Reviewed all pertinent imaging. Please see official radiology report.  CTA Chest Abdomen Pelvis w Contrast   Final Result   IMPRESSION:   1.  No evidence of acute aortic syndrome or pulmonary emboli.   2.  Subpleural consolidation in the left lower lobe suggesting pneumonia. Associated endobronchial opacities could be retained secretions or aspiration.   3.  Small 0.4 cm obstructing calculus in the proximal right ureter with mild upstream dilation.   4.  Morphologic changes of cirrhosis with probable steatosis. Recommend hepatology consultation.                       EKG:            PROCEDURES:     Kevin Marie M.D.  Emergency Medicine  [facility]  [facility] EMERGENCY DEPARTMENT  [facility] [address]  [phone]  Dept: [phone]       Kevin Marie MD  12/16/24 1343       Kevin Marie MD  12/16/24 1353       Kevin Marie MD  12/16/24 [id number]

## 2024-12-17 VITALS
WEIGHT: 206.35 LBS | HEART RATE: 67 BPM | HEIGHT: 62 IN | DIASTOLIC BLOOD PRESSURE: 70 MMHG | BODY MASS INDEX: 37.97 KG/M2 | TEMPERATURE: 98.8 F | SYSTOLIC BLOOD PRESSURE: 133 MMHG | RESPIRATION RATE: 20 BRPM | OXYGEN SATURATION: 100 %

## 2024-12-17 LAB
ALBUMIN SERPL BCG-MCNC: 4 G/DL (ref 3.5–5.2)
ALP SERPL-CCNC: 64 U/L (ref 40–150)
ALT SERPL W P-5'-P-CCNC: 44 U/L (ref 0–50)
ANION GAP SERPL CALCULATED.3IONS-SCNC: 11 MMOL/L (ref 7–15)
AST SERPL W P-5'-P-CCNC: 35 U/L (ref 0–45)
BACTERIA UR CULT: NO GROWTH
BASOPHILS # BLD AUTO: 0.1 10E3/UL (ref 0–0.2)
BASOPHILS NFR BLD AUTO: 1 %
BILIRUB DIRECT SERPL-MCNC: <0.2 MG/DL (ref 0–0.3)
BILIRUB SERPL-MCNC: 0.7 MG/DL
BUN SERPL-MCNC: 7.8 MG/DL (ref 6–20)
C PNEUM DNA SPEC QL NAA+PROBE: NOT DETECTED
CALCIUM SERPL-MCNC: 9.1 MG/DL (ref 8.8–10.4)
CHLORIDE SERPL-SCNC: 106 MMOL/L (ref 98–107)
CREAT SERPL-MCNC: 0.77 MG/DL (ref 0.51–0.95)
EGFRCR SERPLBLD CKD-EPI 2021: >90 ML/MIN/1.73M2
EOSINOPHIL # BLD AUTO: 0.1 10E3/UL (ref 0–0.7)
EOSINOPHIL NFR BLD AUTO: 1 %
ERYTHROCYTE [DISTWIDTH] IN BLOOD BY AUTOMATED COUNT: 11.9 % (ref 10–15)
FLUAV H1 2009 PAND RNA SPEC QL NAA+PROBE: NOT DETECTED
FLUAV H1 RNA SPEC QL NAA+PROBE: NOT DETECTED
FLUAV H3 RNA SPEC QL NAA+PROBE: NOT DETECTED
FLUAV RNA SPEC QL NAA+PROBE: NOT DETECTED
FLUBV RNA SPEC QL NAA+PROBE: NOT DETECTED
GLUCOSE SERPL-MCNC: 87 MG/DL (ref 70–99)
HADV DNA SPEC QL NAA+PROBE: NOT DETECTED
HCO3 SERPL-SCNC: 23 MMOL/L (ref 22–29)
HCOV PNL SPEC NAA+PROBE: NOT DETECTED
HCT VFR BLD AUTO: 39.3 % (ref 35–47)
HGB BLD-MCNC: 12.8 G/DL (ref 11.7–15.7)
HMPV RNA SPEC QL NAA+PROBE: NOT DETECTED
HPIV1 RNA SPEC QL NAA+PROBE: NOT DETECTED
HPIV2 RNA SPEC QL NAA+PROBE: NOT DETECTED
HPIV3 RNA SPEC QL NAA+PROBE: NOT DETECTED
HPIV4 RNA SPEC QL NAA+PROBE: NOT DETECTED
IMM GRANULOCYTES # BLD: 0 10E3/UL
IMM GRANULOCYTES NFR BLD: 0 %
LACTATE SERPL-SCNC: 1.5 MMOL/L (ref 0.7–2)
LACTATE SERPL-SCNC: 2.6 MMOL/L (ref 0.7–2)
LYMPHOCYTES # BLD AUTO: 3.6 10E3/UL (ref 0.8–5.3)
LYMPHOCYTES NFR BLD AUTO: 32 %
M PNEUMO DNA SPEC QL NAA+PROBE: NOT DETECTED
MAGNESIUM SERPL-MCNC: 2 MG/DL (ref 1.7–2.3)
MCH RBC QN AUTO: 31 PG (ref 26.5–33)
MCHC RBC AUTO-ENTMCNC: 32.6 G/DL (ref 31.5–36.5)
MCV RBC AUTO: 95 FL (ref 78–100)
MONOCYTES # BLD AUTO: 0.9 10E3/UL (ref 0–1.3)
MONOCYTES NFR BLD AUTO: 8 %
NEUTROPHILS # BLD AUTO: 6.5 10E3/UL (ref 1.6–8.3)
NEUTROPHILS NFR BLD AUTO: 58 %
NRBC # BLD AUTO: 0 10E3/UL
NRBC BLD AUTO-RTO: 0 /100
PLATELET # BLD AUTO: 257 10E3/UL (ref 150–450)
POTASSIUM SERPL-SCNC: 3.8 MMOL/L (ref 3.4–5.3)
PROT SERPL-MCNC: 7.2 G/DL (ref 6.4–8.3)
RBC # BLD AUTO: 4.13 10E6/UL (ref 3.8–5.2)
RSV RNA SPEC QL NAA+PROBE: NOT DETECTED
RSV RNA SPEC QL NAA+PROBE: NOT DETECTED
RV+EV RNA SPEC QL NAA+PROBE: NOT DETECTED
SODIUM SERPL-SCNC: 140 MMOL/L (ref 135–145)
WBC # BLD AUTO: 11.2 10E3/UL (ref 4–11)

## 2024-12-17 PROCEDURE — 250N000011 HC RX IP 250 OP 636: Performed by: INTERNAL MEDICINE

## 2024-12-17 PROCEDURE — 258N000003 HC RX IP 258 OP 636

## 2024-12-17 PROCEDURE — 99207 PR APP CREDIT; MD BILLING SHARED VISIT: CPT

## 2024-12-17 PROCEDURE — 36415 COLL VENOUS BLD VENIPUNCTURE: CPT | Performed by: INTERNAL MEDICINE

## 2024-12-17 PROCEDURE — 258N000003 HC RX IP 258 OP 636: Performed by: INTERNAL MEDICINE

## 2024-12-17 PROCEDURE — 83605 ASSAY OF LACTIC ACID: CPT

## 2024-12-17 PROCEDURE — 83605 ASSAY OF LACTIC ACID: CPT | Performed by: STUDENT IN AN ORGANIZED HEALTH CARE EDUCATION/TRAINING PROGRAM

## 2024-12-17 PROCEDURE — 82248 BILIRUBIN DIRECT: CPT | Performed by: INTERNAL MEDICINE

## 2024-12-17 PROCEDURE — 250N000013 HC RX MED GY IP 250 OP 250 PS 637: Performed by: INTERNAL MEDICINE

## 2024-12-17 PROCEDURE — 80053 COMPREHEN METABOLIC PANEL: CPT | Performed by: INTERNAL MEDICINE

## 2024-12-17 PROCEDURE — 250N000013 HC RX MED GY IP 250 OP 250 PS 637

## 2024-12-17 PROCEDURE — 85025 COMPLETE CBC W/AUTO DIFF WBC: CPT | Performed by: INTERNAL MEDICINE

## 2024-12-17 PROCEDURE — 80321 ALCOHOLS BIOMARKERS 1OR 2: CPT | Performed by: INTERNAL MEDICINE

## 2024-12-17 PROCEDURE — 36415 COLL VENOUS BLD VENIPUNCTURE: CPT | Performed by: STUDENT IN AN ORGANIZED HEALTH CARE EDUCATION/TRAINING PROGRAM

## 2024-12-17 PROCEDURE — 83735 ASSAY OF MAGNESIUM: CPT | Performed by: INTERNAL MEDICINE

## 2024-12-17 PROCEDURE — 99239 HOSP IP/OBS DSCHRG MGMT >30: CPT | Mod: FS | Performed by: STUDENT IN AN ORGANIZED HEALTH CARE EDUCATION/TRAINING PROGRAM

## 2024-12-17 PROCEDURE — 36415 COLL VENOUS BLD VENIPUNCTURE: CPT

## 2024-12-17 RX ORDER — CEFDINIR 300 MG/1
300 CAPSULE ORAL 2 TIMES DAILY
Qty: 8 CAPSULE | Refills: 0 | Status: SHIPPED | OUTPATIENT
Start: 2024-12-17 | End: 2024-12-21

## 2024-12-17 RX ORDER — TRAMADOL HYDROCHLORIDE 50 MG/1
50 TABLET ORAL EVERY 6 HOURS PRN
Qty: 18 TABLET | Refills: 0 | Status: SHIPPED | OUTPATIENT
Start: 2024-12-17

## 2024-12-17 RX ORDER — AZITHROMYCIN 250 MG/1
250 TABLET, FILM COATED ORAL DAILY
Qty: 4 TABLET | Refills: 0 | Status: SHIPPED | OUTPATIENT
Start: 2024-12-17 | End: 2024-12-21

## 2024-12-17 RX ORDER — TAMSULOSIN HYDROCHLORIDE 0.4 MG/1
0.4 CAPSULE ORAL DAILY
Qty: 14 CAPSULE | Refills: 0 | Status: SHIPPED | OUTPATIENT
Start: 2024-12-18

## 2024-12-17 RX ADMIN — GUAIFENESIN 1200 MG: 600 TABLET ORAL at 07:49

## 2024-12-17 RX ADMIN — AZITHROMYCIN MONOHYDRATE 250 MG: 500 INJECTION, POWDER, LYOPHILIZED, FOR SOLUTION INTRAVENOUS at 13:29

## 2024-12-17 RX ADMIN — TRAMADOL HYDROCHLORIDE 50 MG: 50 TABLET, COATED ORAL at 09:53

## 2024-12-17 RX ADMIN — CEFTRIAXONE SODIUM 2 G: 2 INJECTION, POWDER, FOR SOLUTION INTRAMUSCULAR; INTRAVENOUS at 13:29

## 2024-12-17 RX ADMIN — SODIUM CHLORIDE 500 ML: 9 INJECTION, SOLUTION INTRAVENOUS at 10:35

## 2024-12-17 RX ADMIN — ACETAMINOPHEN 650 MG: 325 TABLET ORAL at 05:11

## 2024-12-17 RX ADMIN — KETOROLAC TROMETHAMINE 30 MG: 30 INJECTION, SOLUTION INTRAMUSCULAR at 05:13

## 2024-12-17 RX ADMIN — TAMSULOSIN HYDROCHLORIDE 0.4 MG: 0.4 CAPSULE ORAL at 07:49

## 2024-12-17 ASSESSMENT — ACTIVITIES OF DAILY LIVING (ADL)
ADLS_ACUITY_SCORE: 18

## 2024-12-17 NOTE — PLAN OF CARE
"  Problem: Adult Inpatient Plan of Care  Goal: Plan of Care Review  Description: The Plan of Care Review/Shift note should be completed every shift.  The Outcome Evaluation is a brief statement about your assessment that the patient is improving, declining, or no change.  This information will be displayed automatically on your shift  note.  12/17/2024 1419 by Lyudmila Cabrera RN  Outcome: Met  12/17/2024 1052 by Lyudmila Cabrera RN  Outcome: Progressing  Goal: Patient-Specific Goal (Individualized)  Description: You can add care plan individualizations to a care plan. Examples of Individualization might be:  \"Parent requests to be called daily at 9am for status\", \"I have a hard time hearing out of my right ear\", or \"Do not touch me to wake me up as it startles  me\".  12/17/2024 1419 by Lyudmila Cabrera RN  Outcome: Met  12/17/2024 1052 by Lyudmila Cabrera RN  Outcome: Progressing  Goal: Absence of Hospital-Acquired Illness or Injury  12/17/2024 1419 by Lyudmila Cabrera RN  Outcome: Met  12/17/2024 1052 by Lyudmila Cabrera RN  Outcome: Progressing  Intervention: Identify and Manage Fall Risk  Recent Flowsheet Documentation  Taken 12/17/2024 1105 by Lyudmila Cabrera RN  Safety Promotion/Fall Prevention:   clutter free environment maintained   nonskid shoes/slippers when out of bed   room near nurse's station  Taken 12/17/2024 0800 by Lyudmila Cabrera RN  Safety Promotion/Fall Prevention:   clutter free environment maintained   nonskid shoes/slippers when out of bed   room near nurse's station  Intervention: Prevent Skin Injury  Recent Flowsheet Documentation  Taken 12/17/2024 1105 by Lyudmila Cabrera RN  Body Position: position changed independently  Taken 12/17/2024 0800 by Lyudmila Cabrera RN  Body Position: position changed independently  Goal: Optimal Comfort and Wellbeing  12/17/2024 1419 by Lyudmila Cabrera RN  Outcome: Met  12/17/2024 1052 by Lyudmila Cabrera RN  Outcome: Progressing  Intervention: Monitor " Pain and Promote Comfort  Recent Flowsheet Documentation  Taken 12/17/2024 0953 by Lyudmila Cabrera RN  Pain Management Interventions: medication (see MAR)  Goal: Readiness for Transition of Care  12/17/2024 1419 by Lyudmila Cabrera RN  Outcome: Met  12/17/2024 1052 by Lyudmila Cabrera RN  Outcome: Progressing     Problem: Pain Acute  Goal: Optimal Pain Control and Function  12/17/2024 1419 by Lyudmila Cabrera RN  Outcome: Met  12/17/2024 1052 by Lyudmila Cabrera RN  Outcome: Progressing  Intervention: Develop Pain Management Plan  Recent Flowsheet Documentation  Taken 12/17/2024 0953 by Lyudmila Cabrera RN  Pain Management Interventions: medication (see MAR)  Intervention: Prevent or Manage Pain  Recent Flowsheet Documentation  Taken 12/17/2024 1105 by yLudmila Cabrera RN  Medication Review/Management: medications reviewed  Taken 12/17/2024 0800 by Lyudmila Cabrera RN  Medication Review/Management: medications reviewed     Problem: Nausea and Vomiting  Goal: Nausea and Vomiting Relief  12/17/2024 1419 by Lyudmila Cabrera RN  Outcome: Met  12/17/2024 1052 by Lyudmila Cabrera RN  Outcome: Progressing     Problem: Infection  Goal: Absence of Infection Signs and Symptoms  12/17/2024 1419 by Lyudmila Cabrera RN  Outcome: Met  12/17/2024 1052 by Lyudmila Cabrera RN  Outcome: Progressing   Goal Outcome Evaluation:               Pt received all of her discharge paperwork and belongings. Pt will leave the unit at around 1530 after her iv antibiotic is done. Continue to monitor pt.

## 2024-12-17 NOTE — PLAN OF CARE
Problem: Pain Acute  Goal: Optimal Pain Control and Function  Outcome: Progressing     Problem: Nausea and Vomiting  Goal: Nausea and Vomiting Relief  Outcome: Progressing   Goal Outcome Evaluation:       Pt AxOx4 on room air. Denies abdominal pain at this time. N/V episode x 1 with slight dizziness, too soon to give zofran or compazine, pt able to tolerate at this time, reports she feels better after vomiting. IV fluid bolus finished. Ind in room. K and Mg protocol rechecks for tomorrow.

## 2024-12-17 NOTE — DISCHARGE SUMMARY
"[facility]  Hospitalist Discharge Summary      Date of Admission:  12/16/2024  Date of Discharge:  12/17/2024  Discharging Provider: Reva Dumont NP  Discharge Service: Hospitalist Service    Discharge Diagnoses   Community acquired pneumonia  Right ureteral stone    Clinically Significant Risk Factors     # Obesity: Estimated body mass index is 37.74 kg/m  as calculated from the following:    Height as of this encounter: 1.575 m (5' 2\").    Weight as of this encounter: 93.6 kg (206 lb 5.6 oz).       Follow-ups Needed After Discharge   Follow-up Appointments       Hospital Follow-up with Existing Primary Care Provider (PCP)      Please see details below         Schedule Primary Care visit within: 7 Days               Unresulted Labs Ordered in the Past 30 Days of this Admission       Date and Time Order Name Status Description    12/17/2024 12:01 AM Phosphatidylethanol (PEth), Whole Blood In process           Discharge Disposition   Discharged to home  Condition at discharge: Stable    Hospital Course      Joseph Luevano is a 29 year old female with PMH of C-sections X3, acute appendicitis, obesity, marijuana use, admitted on 12/16/2024 with community-acquired pneumonia and right ureteral stone after presenting to the hospital with cough, shortness of breath, and right flank pain    Community-acquired pneumonia  Sepsis due to pneumonia  -Chest CT-subpleural consolidation in the left lower lobe suggesting pneumonia.  -WBC-13.2-->11.2  -Afebrile  -Tachycardic on admission  -Lactic-2.4-->2.6-->500 ml fluid bolus-->1.5  -Ceftriaxone/azithromycin while inpatient. Sent azithromax and cefdinir for discharge  -IVF's  -Respiratory panel-negative    Renal colic on the right  4 mm proximal ureter stone, with mild hydronephrosis  UTI  -Ceftriaxone as above  -Seen by urology, no plans for OR  -Flomax 0.4 mg nightly per urology  -Tramadol for pain    Hepatic steatosis, likely NUNEZ.  No history of EtOH.  "   Check normal A1c at 5.3.  -normal LFTs on admission  -Peth pending  -Outpatient GI evaluation-referral sent     Diet:  Regular  DVT Prophylaxis: Enoxaparin (Lovenox) SQ  Hernandez Catheter: Not present  Lines: None     Cardiac Monitoring: None  Code Status:  full    Consultations This Hospital Stay   UROLOGY IP CONSULT    Code Status   Full Code    Time Spent on this Encounter   I, Reva Dumont NP, personally saw the patient today and spent greater than 30 minutes discharging this patient.       Reva Dumont NP  [facility] EXTENDED RECOVERY AND SHORT STAY  [address] [place] [address]  Phone: [phone]  Fax: [phone]  ______________________________________________________________________    Physical Exam   Vital Signs: Temp: 98.8  F (37.1  C) Temp src: Oral BP: 133/70 Pulse: 67   Resp: 20 SpO2: 100 % O2 Device: None (Room air)    Weight: 206 lbs 5.61 oz  Constitutional: awake, alert, cooperative, no apparent distress, and appears stated age  Respiratory: No increased work of breathing, good air exchange, clear to auscultation bilaterally, no crackles or wheezing  Cardiovascular: Normal apical impulse, regular rate and rhythm, normal S1 and S2, no S3 or S4, and no murmur noted  GI: No scars, normal bowel sounds, soft, non-distended, non-tender, no masses palpated, no hepatosplenomegally       Primary Care Physician   [facility]    Discharge Orders      Adult GI  Referral - Consult Only      Reason for your hospital stay    Pneumonia  Right kidney stone     Activity    Your activity upon discharge: activity as tolerated     Diet    Follow this diet upon discharge: Current Diet:Orders Placed This Encounter      Regular Diet Adult     Hospital Follow-up with Existing Primary Care Provider (PCP)    Please see details below            Significant Results and Procedures   Results for orders placed or performed during the hospital  encounter of 12/16/24   CTA Chest Abdomen Pelvis w Contrast    Narrative    EXAM: CTA CHEST ABDOMEN PELVIS W CONTRAST  LOCATION: [facility]  DATE: 12/16/2024    INDICATION: Abdominal pain plus shortness of breath, recent pneumonia diagnosis, evaluate for PE, chest pathology, abdominal pain.  COMPARISON: None.  TECHNIQUE: CT angiogram chest abdomen pelvis during arterial phase of injection of IV contrast. 2D and 3D MIP reconstructions were performed by the CT technologist. Dose reduction techniques were used.   CONTRAST: IsoVue 370 90mL    FINDINGS:   CT ANGIOGRAM CHEST, ABDOMEN, AND PELVIS: Evaluation of the aortic root limited by cardiac motion. Normal caliber aorta with no evidence of aneurysm, dissection, or significant narrowing. Pulmonary arteries are well opacified and no evidence of pulmonary   emboli.    LUNGS AND PLEURA: Endobronchial opacities and subpleural consolidation in the left lower lobe. No pleural effusion or pneumothorax.    MEDIASTINUM/AXILLAE: Normal.    CORONARY ARTERY CALCIFICATION: None.    HEPATOBILIARY: Morphologic changes of cirrhosis with probable steatosis. Cholecystectomy.    PANCREAS: Normal.    SPLEEN: Normal.    ADRENAL GLANDS: Normal.    KIDNEYS/BLADDER: 0.4 x 0.3 x 0.4 cm calculus in the proximal right ureter with mild upstream collecting system dilation and periureteral fat stranding.    BOWEL: Normal.    LYMPH NODES: Normal.    PELVIC ORGANS: Normal.    MUSCULOSKELETAL: Normal.      Impression    IMPRESSION:  1.  No evidence of acute aortic syndrome or pulmonary emboli.  2.  Subpleural consolidation in the left lower lobe suggesting pneumonia. Associated endobronchial opacities could be retained secretions or aspiration.  3.  Small 0.4 cm obstructing calculus in the proximal right ureter with mild upstream dilation.  4.  Morphologic changes of cirrhosis with probable steatosis. Recommend hepatology consultation.           Discharge Medications   Current  Discharge Medication List        START taking these medications    Details   azithromycin (ZITHROMAX) 250 MG tablet Take 1 tablet (250 mg) by mouth daily for 4 days.  Qty: 4 tablet, Refills: 0    Associated Diagnoses: Community acquired pneumonia of left lower lobe of lung      cefdinir (OMNICEF) 300 MG capsule Take 1 capsule (300 mg) by mouth 2 times daily for 4 days.  Qty: 8 capsule, Refills: 0    Associated Diagnoses: Community acquired pneumonia of left lower lobe of lung      tamsulosin (FLOMAX) 0.4 MG capsule Take 1 capsule (0.4 mg) by mouth daily.  Qty: 14 capsule, Refills: 0    Associated Diagnoses: Ureteral stone      traMADol (ULTRAM) 50 MG tablet Take 1 tablet (50 mg) by mouth every 6 hours as needed for moderate pain.  Qty: 18 tablet, Refills: 0    Associated Diagnoses: Ureteral stone           CONTINUE these medications which have NOT CHANGED    Details   albuterol (PROAIR HFA/PROVENTIL HFA/VENTOLIN HFA) 108 (90 Base) MCG/ACT inhaler Inhale 2 puffs into the lungs every 6 hours.  Qty: 18 g, Refills: 0    Comments: Pharmacy may dispense brand covered by insurance (Proair, or proventil or ventolin or generic albuterol inhaler)  Associated Diagnoses: Pneumonia of left lower lobe due to infectious organism           Allergies   Allergies   Allergen Reactions    Norelgestromin-Eth Estradiol Rash     From adhesive

## 2024-12-17 NOTE — PLAN OF CARE
Problem: Adult Inpatient Plan of Care  Goal: Absence of Hospital-Acquired Illness or Injury  Intervention: Identify and Manage Fall Risk  Recent Flowsheet Documentation  Taken 12/16/2024 1947 by Shagufta Meredith RN  Safety Promotion/Fall Prevention:   clutter free environment maintained   nonskid shoes/slippers when out of bed   room near nurse's station     Problem: Pain Acute  Goal: Optimal Pain Control and Function  Intervention: Prevent or Manage Pain  Recent Flowsheet Documentation  Taken 12/16/2024 1947 by Shagufta Meredith RN  Medication Review/Management: medications reviewed   Goal Outcome Evaluation:      Plan of Care Reviewed With: patient    Overall Patient Progress: improvingOverall Patient Progress: improving    Admitted: Bronchopneumonia - R Uretheral stone  Vitals: VSS, afebrile  Neuro: A/O x 4    Cardiac:  Reg radial pulse        Respiratory:  O2 saturation > 92% on RA.  GI/:  Nauseous when arrived at the unit from ED but managed without antiemetic. Adequate UO ambulates to bathroom, Had 1x BM. Pt 2nd day of menstrual cycle  Diet/appetite: Reg Diet  Activity:Ambulates independent  Pain:  Lower abd pain - Gave PRN Tylenol & Toradol IV  Skin: No adjustment needed  LDA's:  L PIV SL  Plan:  No plan for the OR as patient appears clinically well.  Recommend medically optimizing pneumonia at this time. If patient declines clinically or UC is positive, please reach out to urology team.  -Recommend Flomax 0.4mg QHS - monitor for orthostatic hypotension  -Will follow up with the patient in an outpatient setting    Shagufta Meredith RN

## 2024-12-17 NOTE — PLAN OF CARE
"  Problem: Adult Inpatient Plan of Care  Goal: Plan of Care Review  Description: The Plan of Care Review/Shift note should be completed every shift.  The Outcome Evaluation is a brief statement about your assessment that the patient is improving, declining, or no change.  This information will be displayed automatically on your shift  note.  Outcome: Progressing  Goal: Patient-Specific Goal (Individualized)  Description: You can add care plan individualizations to a care plan. Examples of Individualization might be:  \"Parent requests to be called daily at 9am for status\", \"I have a hard time hearing out of my right ear\", or \"Do not touch me to wake me up as it startles  me\".  Outcome: Progressing  Goal: Absence of Hospital-Acquired Illness or Injury  Outcome: Progressing  Intervention: Identify and Manage Fall Risk  Recent Flowsheet Documentation  Taken 12/17/2024 0800 by Lyudmila Cabrera RN  Safety Promotion/Fall Prevention:   clutter free environment maintained   nonskid shoes/slippers when out of bed   room near nurse's station  Intervention: Prevent Skin Injury  Recent Flowsheet Documentation  Taken 12/17/2024 0800 by Lyudmila Cabrera RN  Body Position: position changed independently  Goal: Optimal Comfort and Wellbeing  Outcome: Progressing  Goal: Readiness for Transition of Care  Outcome: Progressing     Problem: Pain Acute  Goal: Optimal Pain Control and Function  Outcome: Progressing  Intervention: Prevent or Manage Pain  Recent Flowsheet Documentation  Taken 12/17/2024 0800 by Lyudmila Cabrera RN  Medication Review/Management: medications reviewed     Problem: Nausea and Vomiting  Goal: Nausea and Vomiting Relief  Outcome: Progressing     Problem: Infection  Goal: Absence of Infection Signs and Symptoms  Outcome: Progressing   Goal Outcome Evaluation:         Pt is alert and oriented x4. Pt is med complaint. Pt's v/s is stable. Pt is up independently. Pt received pain med for flank pain. Continue to monitor " pt.

## 2024-12-19 ENCOUNTER — PATIENT OUTREACH (OUTPATIENT)
Dept: CARE COORDINATION | Facility: CLINIC | Age: 29
End: 2024-12-19
Payer: COMMERCIAL

## 2024-12-19 LAB
LABORATORY REPORT: NORMAL
PETH INTERPRETATION: NORMAL
PLPETH BLD-MCNC: <10 NG/ML
POPETH BLD-MCNC: <10 NG/ML

## 2024-12-19 NOTE — PROGRESS NOTES
Clinic Care Coordination Contact  [facility]    Clinical Data: Care Coordinator Outreach    Outreach Documentation Number of Outreach Attempt   12/19/2024  10:04 AM 2       Unable to leave a message due to: Voicemail is full.      Care Coordinator will do no further outreaches at this time.    Riana Corral  [phone]  Care

## 2024-12-26 ENCOUNTER — OFFICE VISIT (OUTPATIENT)
Dept: FAMILY MEDICINE | Facility: CLINIC | Age: 29
End: 2024-12-26
Payer: COMMERCIAL

## 2024-12-26 VITALS
WEIGHT: 203.2 LBS | TEMPERATURE: 98.2 F | SYSTOLIC BLOOD PRESSURE: 110 MMHG | OXYGEN SATURATION: 97 % | BODY MASS INDEX: 37.39 KG/M2 | HEIGHT: 62 IN | DIASTOLIC BLOOD PRESSURE: 70 MMHG | RESPIRATION RATE: 20 BRPM | HEART RATE: 83 BPM

## 2024-12-26 DIAGNOSIS — N20.0 KIDNEY STONE: ICD-10-CM

## 2024-12-26 DIAGNOSIS — M54.50 ACUTE BILATERAL LOW BACK PAIN WITHOUT SCIATICA: Primary | ICD-10-CM

## 2024-12-26 LAB
ALBUMIN UR-MCNC: NEGATIVE MG/DL
APPEARANCE UR: CLEAR
BILIRUB UR QL STRIP: NEGATIVE
COLOR UR AUTO: YELLOW
ERYTHROCYTE [DISTWIDTH] IN BLOOD BY AUTOMATED COUNT: 12 % (ref 10–15)
GLUCOSE UR STRIP-MCNC: NEGATIVE MG/DL
HCT VFR BLD AUTO: 41.6 % (ref 35–47)
HGB BLD-MCNC: 14 G/DL (ref 11.7–15.7)
HGB UR QL STRIP: NEGATIVE
KETONES UR STRIP-MCNC: NEGATIVE MG/DL
LEUKOCYTE ESTERASE UR QL STRIP: NEGATIVE
MCH RBC QN AUTO: 31.7 PG (ref 26.5–33)
MCHC RBC AUTO-ENTMCNC: 33.7 G/DL (ref 31.5–36.5)
MCV RBC AUTO: 94 FL (ref 78–100)
NITRATE UR QL: NEGATIVE
PH UR STRIP: 5.5 [PH] (ref 5–8)
PLATELET # BLD AUTO: 273 10E3/UL (ref 150–450)
RBC # BLD AUTO: 4.42 10E6/UL (ref 3.8–5.2)
SP GR UR STRIP: 1.02 (ref 1–1.03)
UROBILINOGEN UR STRIP-ACNC: 0.2 E.U./DL
WBC # BLD AUTO: 7.7 10E3/UL (ref 4–11)

## 2024-12-26 RX ORDER — TIZANIDINE 2 MG/1
2 TABLET ORAL 3 TIMES DAILY
Qty: 30 TABLET | Refills: 1 | Status: SHIPPED | OUTPATIENT
Start: 2024-12-26

## 2024-12-26 ASSESSMENT — PAIN SCALES - GENERAL: PAINLEVEL_OUTOF10: SEVERE PAIN (6)

## 2024-12-26 NOTE — PROGRESS NOTES
Assessment & Plan     Acute bilateral low back pain without sciatica  Concern for kidney stone obstruction. UA and WBC both normal. CVA negative. Significant tenderness on exam so could be musculoskeletal in nature. Will have her start a muscle relaxer. Reviewed with patient concerning findings: N/V, fevers or chills, back pain not relieved by any medication all warrant ER vsiist. She appeared clinically well today. Vital signs stable. Did not feel any urgent imaging was warranted today.   - UA Macroscopic with reflex to Microscopic and Culture - Lab Collect; Future  - CBC with platelets; Future  - UA Macroscopic with reflex to Microscopic and Culture - Lab Collect  - CBC with platelets  - tiZANidine (ZANAFLEX) 2 MG tablet; Take 1 tablet (2 mg) by mouth 3 times daily.    Kidney stone  Given the kidney stone on CT AP will have her see urology for management and prevention.   - Adult Urology  Referral; Future        MED REC REQUIRED  Post Medication Reconciliation Status:         Follow up if no improvement.    Kanchan Ruiz is a 29 year old, presenting for the following health issues:  Hospital F/U (Kidney stones, UTI and pneumonia )      12/26/2024    10:38 AM   Additional Questions   Roomed by Shikha Parks   Accompanied by spouse     HPI       Hospital Follow-up Visit:    Hospital/Nursing Home/IP Rehab Facility: [facility]  Date of Admission: 12/16/24  Date of Discharge: 12/17/24  Reason(s) for Admission: Community acquired pneumonia and right ureteral stone  Was the patient in the ICU or did the patient experience delirium during hospitalization?  No  Do you have any other stressors you would like to discuss with your provider? No    Problems taking medications regularly:  None  Medication changes since discharge: None  Problems adhering to non-medication therapy:  None    Summary of hospitalization:  [facility] discharge summary reviewed  Diagnostic  "Tests/Treatments reviewed.  Follow up needed: Hepatic steatosis, normal LFTS, outpatient GI referral  Other Healthcare Providers Involved in Patient s Care:         None  Update since discharge: fluctuating course.       Pain in her back that started Tuesday, now on both sides.   Pain is coming around to front   Having some nausea   Having diarrhea- every day  Pain is not as severe as the pain in the hospital   Tried tramadol or ibuprofen, did not help  Has had back pain before, but ibuprofen will help  Bothering her to sleep   No fevers or chills   Azithromycin and cefdinir post discharge, completed. Last week Friday.    Urine pregnancy negative. LMP 12/19    Incidental hepatic steatosis on CT AP, recommended hepatology follow up. LFTS normal. No ETOH use.    Plan of care communicated with patient                 Review of Systems  Detailed as above.      Objective    /70   Pulse 83   Temp 98.2  F (36.8  C) (Temporal)   Resp 20   Ht 1.575 m (5' 2\")   Wt 92.2 kg (203 lb 3.2 oz)   LMP 12/16/2024 (Exact Date)   SpO2 97%   BMI 37.17 kg/m    Body mass index is 37.17 kg/m .  Physical Exam   GENERAL: alert and no distress  NECK: no adenopathy, no asymmetry, masses, or scars  RESP: lungs clear to auscultation - no rales, rhonchi or wheezes  CV: regular rate and rhythm, normal S1 S2, no S3 or S4, no murmur, click or rub, no peripheral edema  ABDOMEN: tenderness upper midline and bowel sounds normal  MS: normal muscle tone and tenderness to palpation Mid back to coccyx    Results for orders placed or performed in visit on 12/26/24 (from the past 24 hours)   CBC with platelets   Result Value Ref Range    WBC Count 7.7 4.0 - 11.0 10e3/uL    RBC Count 4.42 3.80 - 5.20 10e6/uL    Hemoglobin 14.0 11.7 - 15.7 g/dL    Hematocrit 41.6 35.0 - 47.0 %    MCV 94 78 - 100 fL    MCH 31.7 26.5 - 33.0 pg    MCHC 33.7 31.5 - 36.5 g/dL    RDW 12.0 10.0 - 15.0 %    Platelet Count 273 150 - 450 10e3/uL   UA Macroscopic with reflex " to Microscopic and Culture - Lab Collect    Specimen: Urine, Midstream   Result Value Ref Range    Color Urine Yellow Colorless, Straw, Light Yellow, Yellow    Appearance Urine Clear Clear    Glucose Urine Negative Negative mg/dL    Bilirubin Urine Negative Negative    Ketones Urine Negative Negative mg/dL    Specific Gravity Urine 1.020 1.005 - 1.030    Blood Urine Negative Negative    pH Urine 5.5 5.0 - 8.0    Protein Albumin Urine Negative Negative mg/dL    Urobilinogen Urine 0.2 0.2, 1.0 E.U./dL    Nitrite Urine Negative Negative    Leukocyte Esterase Urine Negative Negative    Narrative    Microscopic not indicated           Signed Electronically by: NILE Coulter CNP

## 2024-12-26 NOTE — PATIENT INSTRUCTIONS
Hepatology (Liver ) Referral: Please be aware that coverage of these services is subject to the terms and limitations of your health insurance plan.  Call member services at your health plan with any benefit or coverage questions.  [facility] will call you to coordinate your care as prescribed by the provider.  If you don t hear from a representative within 2 business days, please call [phone].

## 2025-05-29 ENCOUNTER — OFFICE VISIT (OUTPATIENT)
Dept: FAMILY MEDICINE | Facility: CLINIC | Age: 30
End: 2025-05-29
Payer: COMMERCIAL

## 2025-05-29 VITALS
DIASTOLIC BLOOD PRESSURE: 68 MMHG | BODY MASS INDEX: 38.09 KG/M2 | WEIGHT: 207 LBS | TEMPERATURE: 97.9 F | RESPIRATION RATE: 18 BRPM | OXYGEN SATURATION: 100 % | HEIGHT: 62 IN | SYSTOLIC BLOOD PRESSURE: 118 MMHG | HEART RATE: 70 BPM

## 2025-05-29 DIAGNOSIS — Z01.818 PREOP GENERAL PHYSICAL EXAM: Primary | ICD-10-CM

## 2025-05-29 DIAGNOSIS — F41.9 ANXIETY: ICD-10-CM

## 2025-05-29 DIAGNOSIS — Z12.4 SCREENING FOR CERVICAL CANCER: ICD-10-CM

## 2025-05-29 DIAGNOSIS — L76.82 INCISIONAL PAIN: ICD-10-CM

## 2025-05-29 RX ORDER — HYDROXYZINE HYDROCHLORIDE 10 MG/1
10 TABLET, FILM COATED ORAL 3 TIMES DAILY PRN
Qty: 30 TABLET | Refills: 3 | Status: SHIPPED | OUTPATIENT
Start: 2025-05-29

## 2025-05-29 ASSESSMENT — PAIN SCALES - GENERAL: PAINLEVEL_OUTOF10: NO PAIN (0)

## 2025-05-29 NOTE — PATIENT INSTRUCTIONS
How to Take Your Medication Before Surgery  Preoperative Medication Instructions   Antiplatelet or Anticoagulation Medication Instructions   - We reviewed the medication list and the patient is not on an antiplatelet or anticoagulation medications.    Additional Medication Instructions  We reviewed the medication list and there are no chronic medications that need to be adjusted for this procedure.       Patient Education   Preparing for Your Surgery  For Adults  Getting started  In most cases, a nurse will call to review your health history and instructions. They will give you an arrival time based on your scheduled surgery time. Please be ready to share:  Your doctor's clinic name and phone number  Your medical, surgical, and anesthesia history  A list of allergies and sensitivities  A list of medicines, including herbal treatments and over-the-counter drugs  Whether the patient has a legal guardian (ask how to send us the papers in advance)  Note: You may not receive a call if you were seen at our PAC (Preoperative Assessment Center).  Please tell us if you're pregnant--or if there's any chance you might be pregnant. Some surgeries may injure a fetus (unborn baby), so they require a pregnancy test. Surgeries that are safe for a fetus don't always need a test, and you can choose whether to have one.   Preparing for surgery  Within 10 to 30 days of surgery: Have a pre-op exam (sometimes called an H&P, or History and Physical). This can be done at a clinic or pre-operative center.  If you're having a , you may not need this exam. Talk to your care team.  At your pre-op exam, talk to your care team about all medicines you take. (This includes CBD oil and any drugs, such as THC, marijuana, and other forms of cannabis.) If you need to stop any medicine before surgery, ask when to start taking it again.  This is for your safety. Many medicines and drugs can make you bleed too much during surgery. Some change  how well surgery (anesthesia) drugs work.  Call your insurance company to let them know you're having surgery. (If you don't have insurance, call [phone].)  Call your clinic if there's any change in your health. This includes a scrape or scratch near the surgery site, or any signs of a cold (sore throat, runny nose, cough, rash, fever).  Eating and drinking guidelines  For your safety: Unless your surgeon tells you otherwise, follow the guidelines below.  Eat and drink as normal until 8 hours before you arrive for surgery. After that, no food or milk. You can spit out gum when you arrive.  Drink clear liquids until 2 hours before you arrive. These are liquids you can see through, like water, Gatorade, and Propel Water. They also include plain black coffee and tea (no cream or milk).  No alcohol for 24 hours before you arrive. The night before surgery, stop any drinks that contain THC.  If your care team tells you to take medicine on the morning of surgery, it's okay to take it with a sip of water. No other medicines or drugs are allowed (including CBD oil)--follow your care team's instructions.  If you have questions the day of surgery, call your hospital or surgery center.   Preventing infection  Shower or bathe the night before and the morning of surgery. Follow the instructions your clinic gave you. (If no instructions, use regular soap.)  Don't shave or clip hair near your surgery site. We'll remove the hair if needed.  Don't smoke or vape the morning of surgery. No chewing tobacco for 6 hours before you arrive. A nicotine patch is okay. You may spit out nicotine gum when you arrive.  For some surgeries, the surgeon will tell you to fully quit smoking and nicotine.  We will make every effort to keep you safe from infection. We will:  Clean our hands often with soap and water (or an alcohol-based hand rub).  Clean the skin at your surgery site with a special soap that kills germs.  Give you a special gown to  keep you warm. (Cold raises the risk of infection.)  Wear hair covers, masks, gowns, and gloves during surgery.  Give antibiotic medicine, if prescribed. Not all surgeries need this medicine.  What to bring on the day of surgery  Photo ID and insurance card  Copy of your health care directive, if you have one  Glasses and hearing aids (bring cases)  You can't wear contacts during surgery  Inhaler and eye drops, if you use them (tell us about these when you arrive)  CPAP machine or breathing device, if you use them  A few personal items, if spending the night  If you have . . .  A pacemaker, ICD (cardiac defibrillator), or other implant: Bring the ID card.  An implanted stimulator: Bring the remote control.  A legal guardian: Bring a copy of the certified (court-stamped) guardianship papers.  Please remove any jewelry, including body piercings. Leave jewelry and other valuables at home.  If you're going home the day of surgery  You must have a support person drive you home. They should stay with you overnight, and they may need to help with your self-care.  If you don't have a support person, please tells us as soon as possible. We can help.  After surgery  If it's hard to control your pain or you need more pain medicine, please call your surgeon's office.  Questions?   If you have any questions for your care team, list them here:   ____________________________________________________________________________________________________________________________________________________________________________________________________________________________________________________________  For informational purposes only. Not to replace the advice of your health care provider. Copyright   2003, 2019 [facility]. All rights reserved. Clinically reviewed by Tyrone Gamble MD. [employer] [id number] - REV 02/25.

## 2025-05-29 NOTE — PROGRESS NOTES
Preoperative Evaluation  [facility]  [address]   [facility] [address]  Phone: [phone]  Fax: [phone]  Primary Provider: [facility]  Pre-op Performing Provider: NILE Coulter CNP  May 29, 2025             2025   Surgical Information   What procedure is being done? na   Facility or Hospital where procedure/surgery will be performed: [facility]   Who is doing the procedure / surgery? Dr Potter   Date of surgery / procedure: [id number]   Time of surgery / procedure: not sure   Where do you plan to recover after surgery? at home with family     Fax number for surgical facility: [phone]    Assessment & Plan     The proposed surgical procedure is considered LOW risk.    Preop general physical exam  Incisional pain  30 year old female here for pre-op exam for scar tissue removal with  incision. She is in good health today. No previous issues with anesthesia.  Okay to proceed.    Anxiety  KASSI score was 15 today, discussed this with patient discussed options of treating anxiety both pharmacologic and nonpharmacologic.  She thinks she just needs an as needed medication to help her when she has periods of increased anxiety and on the verge of a panic attack.  Will start her on 10 mg hydroxyzine.  Follow-up if anxiety not well-controlled with this.    Screening for cervical cancer  Pap collected today.          - No identified additional risk factors other than previously addressed    Preoperative Medication Instructions  Antiplatelet or Anticoagulation Medication Instructions   - We reviewed the medication list and the patient is not on an antiplatelet or anticoagulation medications.    Additional Medication Instructions  We reviewed the medication list and there are no chronic medications that need to be adjusted for this procedure.    Recommendation  Approval given to proceed with proposed procedure, without  further diagnostic evaluation.    Follow-up       Kanchan Ruiz is a 30 year old, presenting for the following:  Pre-Op Exam ( clear scar tissue in uterus) and Anxiety (Wants to re-start med)          2025    10:37 AM   Additional Questions   Roomed by nl     HPI:      Upcoming surgery to remove scar tissue related to  scar.   Has been having a lot of pain with it.     No history of DM2, kidney disease, CVA, CAD.     Anxious  -feeling irritable  -worrying about too many things  -difficulty relaxing   - has issues with MIL, and she is back in their life.   -was on hydroxyzine a number of years ago and that worked well for as needed usage  Not interested in starting a daily medication    Due for Pap smear              2025   Pre-Op Questionnaire   Have you ever had a heart attack or stroke? No   Have you ever had surgery on your heart or blood vessels, such as a stent placement, a coronary artery bypass, or surgery on an artery in your head, neck, heart, or legs? (!) YES, surgery as a kid.    Do you have chest pain with activity? No   Do you have a history of heart failure? No   Do you currently have a cold, bronchitis or symptoms of other infection? No   Do you have a cough, shortness of breath, or wheezing? No   Do you or anyone in your family have previous history of blood clots? No   Do you or does anyone in your family have a serious bleeding problem such as prolonged bleeding following surgeries or cuts? No   Have you ever had problems with anemia or been told to take iron pills? No   Have you had any abnormal blood loss such as black, tarry or bloody stools, or abnormal vaginal bleeding? No   Have you ever had a blood transfusion? (!) UNKNOWN   Are you willing to have a blood transfusion if it is medically needed before, during, or after your surgery? Yes   Have you or any of your relatives ever had problems with anesthesia? No   Do you have sleep apnea, excessive snoring or daytime  drowsiness? No   Do you have any artifical heart valves or other implanted medical devices like a pacemaker, defibrillator, or continuous glucose monitor? No   Do you have artificial joints? No   Are you allergic to latex? No     Advance Care Planning    Discussed advance care planning with patient; however, patient declined at this time.    Preoperative Review of    reviewed - no record of controlled substances prescribed.      Status of Chronic Conditions:  See problem list for active medical problems.  Problems all longstanding and stable, except as noted/documented.  See ROS for pertinent symptoms related to these conditions.    Patient Active Problem List    Diagnosis Date Noted    Urinary tract infection 2024     Priority: Medium    SIRS (systemic inflammatory response syndrome) (H) 2024     Priority: Medium    Ureteral stone 2024     Priority: Medium    Bronchopneumonia 2024     Priority: Medium    Hepatic steatosis 2024     Priority: Medium    Ureterolithiasis 2024     Priority: Medium    Acute cystitis with hematuria 2024     Priority: Medium    Community acquired pneumonia of left lower lobe of lung 2024     Priority: Medium    Renal colic 2024     Priority: Medium    S/P repeat low transverse  2022     Priority: Medium    History of oligohydramnios in prior pregnancy, currently pregnant in second trimester 2022     Priority: Medium     In prior to pregnancies. 1st noted at 41 weeks. 2nd noted at 38 weeks.       Bacteriuria during pregnancy 2021     Priority: Medium    Encounter for supervision of other normal pregnancy in third trimester 2021     Priority: Medium    History of congenital heart defect 2021     Priority: Medium     Hx heart defect, surgically repaired as a child- normal echo andn saw cards in 1st pregnancy      Obesity 2018     Priority: Medium      Past Medical History:   Diagnosis Date     Abnormal fetal ultrasound 2018 = BPP 2/8    Abnormal glucose tolerance in pregnancy 3/20/2018    1hr *. 3hr GTT 76, 152, 108, 101.    Acute appendicitis 2019    Appendicitis 2019    Added automatically from request for surgery [id number]    Bacteriuria in pregnancy 2019    Congenital heart defect 2017    Normal echocardiogram. No follow up needed, per cardiology (see echo report).    CPD (cephalo-pelvic disproportion) 12/3/2019    Delivery by  section of full-term infant 2018    Hepatic steatosis 2024    Hx of  section complicating pregnancy 2019    Low back pain during pregnancy in third trimester 10/1/2019    Marijuana abuse in remission 2018    Stopped when she became pregnant.    Oligohydramnios 2018    Oligohydramnios in third trimester, single or unspecified fetus     PIH (pregnancy induced hypertension) 2022    Post-term pregnancy, 40-42 weeks of gestation 2018    41+ weeks    Postpartum depression     Retrocecal appendix     S/P repeat low transverse  2022    Urinary tract infection 2024     Past Surgical History:   Procedure Laterality Date    APPENDECTOMY      C/SECTION, LOW TRANSVERSE      CARDIAC SURGERY      as a child     SECTION N/A 2018    Procedure:  SECTION;  Surgeon: Ming Potter MD;  Location: [facility];  Service:      SECTION N/A 12/3/2019    Procedure:  SECTION, REPEAT;  Surgeon: Ming Potter MD;  Location: [facility] OR;  Service: Obstetrics     SECTION N/A 2022    Procedure: REPEAT  SECTION;  Surgeon: Ming Potter MD;  Location: [facility]    LAPAROSCOPIC CHOLECYSTECTOMY N/A 2018    Procedure: CHOLECYSTECTOMY, LAPAROSCOPIC;  Surgeon: Rafi Newsome MD;  Location: [facility] [place];  Service:     TX ERCP W/SPHINCTEROTOMY/PAPILLOTOMY N/A 2018    Procedure: ENDOSCOPIC RETROGRADE  "CHOLANGIOPANCREATOGRAPHY, SPHINCTEROTOMY; PANCREATIC DUCT STENT PLACEMENT;  Surgeon: Malik Edwards MD;  Location: [facility];  Service: Gastroenterology    TX LAP,APPENDECTOMY N/A 2019    Procedure: APPENDECTOMY, LAPAROSCOPIC;  Surgeon: Devon Ellison MD;  Location: [facility];  Service: General     Current Outpatient Medications   Medication Sig Dispense Refill    albuterol (PROAIR HFA/PROVENTIL HFA/VENTOLIN HFA) 108 (90 Base) MCG/ACT inhaler Inhale 2 puffs into the lungs every 6 hours. 18 g 0    tiZANidine (ZANAFLEX) 2 MG tablet Take 1 tablet (2 mg) by mouth 3 times daily. (Patient not taking: Reported on 2025) 30 tablet 1    traMADol (ULTRAM) 50 MG tablet Take 1 tablet (50 mg) by mouth every 6 hours as needed for moderate pain. (Patient not taking: Reported on 2025) 18 tablet 0       Allergies   Allergen Reactions    Norelgestromin-Eth Estradiol Rash     From adhesive         Social History     Tobacco Use    Smoking status: Former     Types: Other     Quit date: 2024     Years since quittin.5     Passive exposure: Current (boyfriend smokes around and outside)    Smokeless tobacco: Never   Substance Use Topics    Alcohol use: No     Family History   Problem Relation Age of Onset    Diabetes Mother     Hypertension Mother     No Known Problems Father     Diabetes Sister     No Known Problems Brother     No Known Problems Sister     No Known Problems Brother     No Known Problems Brother      History   Drug Use No     Comment: Drug use: used to smoke marijuana             Review of Systems  Constitutional, neuro, ENT, endocrine, pulmonary, cardiac, gastrointestinal, genitourinary, musculoskeletal, integument and psychiatric systems are negative, except as otherwise noted.    Objective    LMP 2025    Estimated body mass index is 37.68 kg/m  as calculated from the following:    Height as of 25: 1.575 m (5' 2\").    Weight as of 25: 93.4 kg (206 lb).  Physical " Exam  GENERAL: alert and no distress  EYES: Eyes grossly normal to inspection, PERRL and conjunctivae and sclerae normal  HENT: ear canals and TM's normal, nose and mouth without ulcers or lesions  NECK: no adenopathy, no asymmetry, masses, or scars  RESP: lungs clear to auscultation - no rales, rhonchi or wheezes  CV: regular rate and rhythm, normal S1 S2, no S3 or S4, no murmur, click or rub, no peripheral edema  ABDOMEN: soft, nontender, no hepatosplenomegaly, no masses and bowel sounds normal   (female): normal female external genitalia, normal urethral meatus, normal vaginal mucosa  MS: no gross musculoskeletal defects noted, no edema  SKIN: no suspicious lesions or rashes  NEURO: Normal strength and tone, mentation intact and speech normal  PSYCH: mentation appears normal, affect normal/bright    Recent Labs   Lab Test 12/26/24  1117 12/17/24  0624 12/16/24  1042   HGB 14.0 12.8 14.3    257 283   NA  --  140 141   POTASSIUM  --  3.8 4.3   CR  --  0.77 0.77   A1C  --   --  5.3        Diagnostics  No labs were ordered during this visit.   No EKG required for low risk surgery (cataract, skin procedure, breast biopsy, etc).  No EKG required, no history of coronary heart disease, significant arrhythmia, peripheral arterial disease or other structural heart disease.    Revised Cardiac Risk Index (RCRI)  The patient has the following serious cardiovascular risks for perioperative complications:   - No serious cardiac risks = 0 points     RCRI Interpretation: 0 points: Class I (very low risk - 0.4% complication rate)         Signed Electronically by: NILE Coulter CNP  A copy of this evaluation report is provided to the requesting physician.

## 2025-06-02 ENCOUNTER — RESULTS FOLLOW-UP (OUTPATIENT)
Dept: OBGYN | Facility: CLINIC | Age: 30
End: 2025-06-02

## 2025-06-03 LAB
BKR AP ASSOCIATED HPV REPORT: NORMAL
BKR LAB AP GYN ADEQUACY: NORMAL
BKR LAB AP GYN INTERPRETATION: NORMAL
BKR LAB AP LMP: NORMAL
BKR LAB AP PREVIOUS ABNORMAL: NORMAL
PATH REPORT.COMMENTS IMP SPEC: NORMAL
PATH REPORT.COMMENTS IMP SPEC: NORMAL
PATH REPORT.RELEVANT HX SPEC: NORMAL

## 2025-06-13 ENCOUNTER — LAB REQUISITION (OUTPATIENT)
Dept: LAB | Facility: CLINIC | Age: 30
End: 2025-06-13
Payer: COMMERCIAL

## 2025-06-13 DIAGNOSIS — O00.90 UNSPECIFIED ECTOPIC PREGNANCY WITHOUT INTRAUTERINE PREGNANCY: ICD-10-CM

## 2025-06-13 LAB
HCG INTACT+B SERPL-ACNC: ABNORMAL MIU/ML
PROGEST SERPL-MCNC: 14 NG/ML

## 2025-06-13 PROCEDURE — 84144 ASSAY OF PROGESTERONE: CPT | Mod: ORL | Performed by: OBSTETRICS & GYNECOLOGY

## 2025-06-13 PROCEDURE — 84702 CHORIONIC GONADOTROPIN TEST: CPT | Mod: ORL | Performed by: OBSTETRICS & GYNECOLOGY

## 2025-07-24 ENCOUNTER — LAB REQUISITION (OUTPATIENT)
Dept: LAB | Facility: CLINIC | Age: 30
End: 2025-07-24
Payer: COMMERCIAL

## 2025-07-24 DIAGNOSIS — Z36.89 ENCOUNTER FOR OTHER SPECIFIED ANTENATAL SCREENING: ICD-10-CM

## 2025-07-24 DIAGNOSIS — Z36.9 ENCOUNTER FOR ANTENATAL SCREENING, UNSPECIFIED: ICD-10-CM

## 2025-07-24 DIAGNOSIS — O26.811 PREGNANCY RELATED EXHAUSTION AND FATIGUE, FIRST TRIMESTER: ICD-10-CM

## 2025-07-24 LAB
BASOPHILS # BLD AUTO: 0 10E3/UL (ref 0–0.2)
BASOPHILS NFR BLD AUTO: 0 %
EOSINOPHIL # BLD AUTO: 0.1 10E3/UL (ref 0–0.7)
EOSINOPHIL NFR BLD AUTO: 1 %
ERYTHROCYTE [DISTWIDTH] IN BLOOD BY AUTOMATED COUNT: 12.4 % (ref 10–15)
EST. AVERAGE GLUCOSE BLD GHB EST-MCNC: 108 MG/DL
HBA1C MFR BLD: 5.4 %
HCT VFR BLD AUTO: 39.2 % (ref 35–47)
HGB BLD-MCNC: 12.7 G/DL (ref 11.7–15.7)
HIV 1+2 AB+HIV1 P24 AG SERPL QL IA: NONREACTIVE
IMM GRANULOCYTES # BLD: 0 10E3/UL
IMM GRANULOCYTES NFR BLD: 0 %
LYMPHOCYTES # BLD AUTO: 2.3 10E3/UL (ref 0.8–5.3)
LYMPHOCYTES NFR BLD AUTO: 22 %
MCH RBC QN AUTO: 31.4 PG (ref 26.5–33)
MCHC RBC AUTO-ENTMCNC: 32.4 G/DL (ref 31.5–36.5)
MCV RBC AUTO: 97 FL (ref 78–100)
MONOCYTES # BLD AUTO: 0.5 10E3/UL (ref 0–1.3)
MONOCYTES NFR BLD AUTO: 5 %
NEUTROPHILS # BLD AUTO: 7.7 10E3/UL (ref 1.6–8.3)
NEUTROPHILS NFR BLD AUTO: 72 %
NRBC # BLD AUTO: 0 10E3/UL
NRBC BLD AUTO-RTO: 0 /100
PLAT MORPH BLD: ABNORMAL
PLATELET # BLD AUTO: 256 10E3/UL (ref 150–450)
RBC # BLD AUTO: 4.04 10E6/UL (ref 3.8–5.2)
RBC MORPH BLD: ABNORMAL
SPECIMEN EXP DATE BLD: NORMAL
VARIANT LYMPHS BLD QL SMEAR: PRESENT
WBC # BLD AUTO: 10.7 10E3/UL (ref 4–11)

## 2025-07-24 PROCEDURE — 86706 HEP B SURFACE ANTIBODY: CPT | Performed by: OBSTETRICS & GYNECOLOGY

## 2025-07-24 PROCEDURE — 87086 URINE CULTURE/COLONY COUNT: CPT | Mod: ORL | Performed by: OBSTETRICS & GYNECOLOGY

## 2025-07-24 PROCEDURE — 86704 HEP B CORE ANTIBODY TOTAL: CPT | Performed by: OBSTETRICS & GYNECOLOGY

## 2025-07-24 PROCEDURE — 87389 HIV-1 AG W/HIV-1&-2 AB AG IA: CPT | Performed by: OBSTETRICS & GYNECOLOGY

## 2025-07-24 PROCEDURE — 86901 BLOOD TYPING SEROLOGIC RH(D): CPT | Performed by: OBSTETRICS & GYNECOLOGY

## 2025-07-24 PROCEDURE — 86803 HEPATITIS C AB TEST: CPT | Performed by: OBSTETRICS & GYNECOLOGY

## 2025-07-24 PROCEDURE — 86780 TREPONEMA PALLIDUM: CPT | Performed by: OBSTETRICS & GYNECOLOGY

## 2025-07-24 PROCEDURE — 84443 ASSAY THYROID STIM HORMONE: CPT | Performed by: OBSTETRICS & GYNECOLOGY

## 2025-07-24 PROCEDURE — 86762 RUBELLA ANTIBODY: CPT | Performed by: OBSTETRICS & GYNECOLOGY

## 2025-07-24 PROCEDURE — 87086 URINE CULTURE/COLONY COUNT: CPT | Performed by: OBSTETRICS & GYNECOLOGY

## 2025-07-24 PROCEDURE — 83036 HEMOGLOBIN GLYCOSYLATED A1C: CPT | Performed by: OBSTETRICS & GYNECOLOGY

## 2025-07-24 PROCEDURE — 86780 TREPONEMA PALLIDUM: CPT | Mod: ORL | Performed by: OBSTETRICS & GYNECOLOGY

## 2025-07-24 PROCEDURE — 85025 COMPLETE CBC W/AUTO DIFF WBC: CPT | Performed by: OBSTETRICS & GYNECOLOGY

## 2025-07-24 PROCEDURE — 87340 HEPATITIS B SURFACE AG IA: CPT | Performed by: OBSTETRICS & GYNECOLOGY

## 2025-07-24 PROCEDURE — 86762 RUBELLA ANTIBODY: CPT | Mod: ORL | Performed by: OBSTETRICS & GYNECOLOGY

## 2025-07-25 LAB
ABO + RH BLD: NORMAL
BACTERIA UR CULT: ABNORMAL
BACTERIA UR CULT: ABNORMAL
BLD GP AB SCN SERPL QL: NEGATIVE
HBV CORE AB SERPL QL IA: NONREACTIVE
HBV SURFACE AB SERPL IA-ACNC: 59 M[IU]/ML
HBV SURFACE AB SERPL IA-ACNC: REACTIVE M[IU]/ML
HBV SURFACE AG SERPL QL IA: NONREACTIVE
HCV AB SERPL QL IA: NONREACTIVE
RUBV IGG SERPL QL IA: 1.26 INDEX
RUBV IGG SERPL QL IA: POSITIVE
SPECIMEN EXP DATE BLD: NORMAL
T PALLIDUM AB SER QL: NONREACTIVE
TSH SERPL DL<=0.005 MIU/L-ACNC: 1.88 UIU/ML (ref 0.3–4.2)

## 2025-08-21 ENCOUNTER — LAB REQUISITION (OUTPATIENT)
Dept: LAB | Facility: CLINIC | Age: 30
End: 2025-08-21
Payer: COMMERCIAL

## 2025-08-21 DIAGNOSIS — Z36.1 ENCOUNTER FOR ANTENATAL SCREENING FOR RAISED ALPHAFETOPROTEIN LEVEL: ICD-10-CM

## 2025-08-21 PROCEDURE — 82105 ALPHA-FETOPROTEIN SERUM: CPT | Performed by: NURSE PRACTITIONER

## 2025-08-23 LAB
# FETUSES US: NORMAL
AFP MOM SERPL: 1.12
AFP SERPL-MCNC: 29 NG/ML
AGE - REPORTED: 31.9 YR
CURRENT SMOKER: NO
FAMILY MEMBER DISEASES HX: NO
GA METHOD: NORMAL
GA: NORMAL WK
IDDM PATIENT QL: NO
INTEGRATED SCN PATIENT-IMP: NORMAL
SPECIMEN DRAWN SERPL: NORMAL

## (undated) DEVICE — SUTURE VICRYL+ 3-0 27IN CT-1 UND VCP258H

## (undated) DEVICE — PREP CHLORAPREP 26ML TINTED HI-LITE ORANGE 930815

## (undated) DEVICE — GLOVE SURG PI ULTRA TOUCH M SZ 8 LF

## (undated) DEVICE — BLADE CLIPPER PIVOT PURPLE DISP 9660

## (undated) DEVICE — DRESSING MEPILEX BORDER POST-OP 4X8

## (undated) DEVICE — SOL NACL 0.9% IRRIG 1000ML BOTTLE 2F7124

## (undated) DEVICE — GLOVE SURG PI ULTRA TOUCH M SZ 6-1/2 LF

## (undated) DEVICE — STPL SKIN SUBCUTICULAR INSORB  2030

## (undated) DEVICE — SOL WATER IRRIG 1000ML BOTTLE 2F7114

## (undated) DEVICE — SU STRATAFIX PDS PLUS 0 CT-1 18" SXPP1A401

## (undated) DEVICE — GOWN IMPERVIOUS BREATHABLE 2XL/XLONG

## (undated) DEVICE — DRAPE IOBAN 2 C-SECTION 3M 6697

## (undated) DEVICE — SUTURE PDS 0 60IN CTX+ LOOPED PDP990G

## (undated) DEVICE — PACK MINOR SINGLE BASIN SSK3001

## (undated) DEVICE — PLATE GROUNDING ADULT W/CORD 9165L

## (undated) DEVICE — GOWN IMPERVIOUS BREATHABLE SMART LG 89015

## (undated) DEVICE — SU CHROMIC 0 CT 36" 914H

## (undated) DEVICE — GLOVE SURG PI ULTRA TOUCH M SZ 7 LF 42670

## (undated) DEVICE — PACK MAJOR BASIN 673

## (undated) DEVICE — SUCTION MANIFOLD NEPTUNE 2 SYS 1 PORT 702-025-000

## (undated) DEVICE — SURGICEL POWDER ABSORBABLE HEMOSTAT 3GM 3013SP

## (undated) DEVICE — CUSTOM PACK C-SECTION LHE

## (undated) DEVICE — SPONGE LAP 18X18" X8435

## (undated) RX ORDER — ONDANSETRON 2 MG/ML
INJECTION INTRAMUSCULAR; INTRAVENOUS
Status: DISPENSED
Start: 2022-06-07

## (undated) RX ORDER — FENTANYL CITRATE-0.9 % NACL/PF 10 MCG/ML
PLASTIC BAG, INJECTION (ML) INTRAVENOUS
Status: DISPENSED
Start: 2022-06-07

## (undated) RX ORDER — MORPHINE SULFATE 1 MG/ML
INJECTION, SOLUTION EPIDURAL; INTRATHECAL; INTRAVENOUS
Status: DISPENSED
Start: 2022-06-07